# Patient Record
Sex: MALE | Race: WHITE | NOT HISPANIC OR LATINO | Employment: UNEMPLOYED | URBAN - METROPOLITAN AREA
[De-identification: names, ages, dates, MRNs, and addresses within clinical notes are randomized per-mention and may not be internally consistent; named-entity substitution may affect disease eponyms.]

---

## 2019-11-05 PROBLEM — I10 ESSENTIAL HYPERTENSION: Chronic | Status: ACTIVE | Noted: 2019-11-05

## 2020-02-13 ENCOUNTER — NURSE TRIAGE (OUTPATIENT)
Dept: ADMINISTRATIVE | Facility: CLINIC | Age: 55
End: 2020-02-13

## 2020-02-13 NOTE — TELEPHONE ENCOUNTER
Reason for Disposition   Difficult to awaken or acting confused (disoriented, slurred speech) and new onset    Additional Information   Negative: Difficult to awaken or acting confused (disoriented, slurred speech) and has diabetes    Protocols used: CONFUSION - DELIRIUM-A-OH    Cg stated pt was fine then he got dizzy and dont remember anything after that. He started to hallucinate. Double vision. Cg stated he is refusing to go to Er. He is talking but he is not himself. Confusion and slurred speech. Care advice recommend cg call  [phone]. Cg verbalized understanding

## 2020-02-17 PROBLEM — Z91.199 NON-COMPLIANT PATIENT: Chronic | Status: ACTIVE | Noted: 2020-02-17

## 2020-02-27 PROBLEM — E55.9 VITAMIN D DEFICIENCY: Chronic | Status: ACTIVE | Noted: 2020-02-27

## 2020-11-18 ENCOUNTER — HOSPITAL ENCOUNTER (OUTPATIENT)
Dept: RADIOLOGY | Facility: HOSPITAL | Age: 55
Discharge: HOME | End: 2020-11-18
Attending: INTERNAL MEDICINE
Payer: MEDICAID

## 2020-11-18 DIAGNOSIS — R09.89 BILATERAL CAROTID BRUITS: ICD-10-CM

## 2020-11-18 PROCEDURE — 93880 US CAROTID BILATERAL: ICD-10-PCS | Mod: 26,,, | Performed by: RADIOLOGY

## 2020-11-18 PROCEDURE — 93880 EXTRACRANIAL BILAT STUDY: CPT | Mod: TC

## 2020-11-18 PROCEDURE — 93880 EXTRACRANIAL BILAT STUDY: CPT | Mod: 26,,, | Performed by: RADIOLOGY

## 2020-11-24 DIAGNOSIS — G45.8 SUBCLAVIAN STEAL SYNDROME: Primary | ICD-10-CM

## 2020-11-27 ENCOUNTER — HOSPITAL ENCOUNTER (OUTPATIENT)
Dept: RADIOLOGY | Facility: HOSPITAL | Age: 55
Discharge: HOME | End: 2020-11-27
Attending: SURGERY
Payer: MEDICAID

## 2020-11-27 DIAGNOSIS — G45.8 SUBCLAVIAN STEAL SYNDROME: ICD-10-CM

## 2020-11-27 PROCEDURE — 70498 CT ANGIOGRAPHY NECK: CPT | Mod: TC

## 2020-11-27 PROCEDURE — 70498 CTA NECK: ICD-10-PCS | Mod: 26,,, | Performed by: RADIOLOGY

## 2020-11-27 PROCEDURE — 25500020 PHARM REV CODE 255: Performed by: SURGERY

## 2020-11-27 PROCEDURE — 70498 CT ANGIOGRAPHY NECK: CPT | Mod: 26,,, | Performed by: RADIOLOGY

## 2020-11-27 RX ADMIN — IOHEXOL 75 ML: 350 INJECTION, SOLUTION INTRAVENOUS at 01:11

## 2020-11-30 LAB
CREAT SERPL-MCNC: 0.9 MG/DL (ref 0.5–1.4)
SAMPLE: NORMAL

## 2020-12-02 ENCOUNTER — INITIAL CONSULT (OUTPATIENT)
Dept: VASCULAR SURGERY | Facility: CLINIC | Age: 55
End: 2020-12-02
Payer: MEDICAID

## 2020-12-02 VITALS
BODY MASS INDEX: 19.46 KG/M2 | WEIGHT: 156.5 LBS | HEIGHT: 75 IN | HEART RATE: 63 BPM | TEMPERATURE: 99 F | SYSTOLIC BLOOD PRESSURE: 162 MMHG | DIASTOLIC BLOOD PRESSURE: 69 MMHG

## 2020-12-02 DIAGNOSIS — G45.8 SUBCLAVIAN STEAL SYNDROME: ICD-10-CM

## 2020-12-02 PROCEDURE — 99204 OFFICE O/P NEW MOD 45 MIN: CPT | Mod: S$PBB,,, | Performed by: SURGERY

## 2020-12-02 PROCEDURE — 99999 PR PBB SHADOW E&M-EST. PATIENT-LVL III: CPT | Mod: PBBFAC,,, | Performed by: SURGERY

## 2020-12-02 PROCEDURE — 99204 PR OFFICE/OUTPT VISIT, NEW, LEVL IV, 45-59 MIN: ICD-10-PCS | Mod: S$PBB,,, | Performed by: SURGERY

## 2020-12-02 PROCEDURE — 99999 PR PBB SHADOW E&M-EST. PATIENT-LVL III: ICD-10-PCS | Mod: PBBFAC,,, | Performed by: SURGERY

## 2020-12-02 PROCEDURE — 99213 OFFICE O/P EST LOW 20 MIN: CPT | Mod: PBBFAC | Performed by: SURGERY

## 2020-12-02 NOTE — PROGRESS NOTES
Paresh Vance Jr.  12/02/2020    HPI:  Patient is a 55 y.o. male with a h/o CAD, active tobacco use, HTN, and HLD who is here today for evaluation of carotid stenosis possible subclavian steal symptoms.   Main c/o is diplopia and dizziness --- states double vision is constant.  Has seen ophtho x2 and was told all is fine. States dizziness is also throughout the day.    He was referred by his cardiologist after incidental imaging findings of bilateral carotid and subclavian stenosis. He states that he has noticed increasing visual problems for the past two years including blurry vision and worsening double vision. He also states that he has noticed intermittent left arm numbness and tingling which is alleviated with movement, and increasingly more sever right sided headaches over the past year.     + MI in 2015 w/ no intervention  Denies stroke  Tobacco use: Lifetime smoker. Used to smoke 2 packs per day. Now down to 1/2 pack per day.    PMD is Dr Shahid who asked me to see him    Past Medical History:   Diagnosis Date    Coronary artery disease involving native coronary artery without angina pectoris 11/12/2015    Hyperlipidemia 11/12/2015    Hypertension      Past Surgical History:   Procedure Laterality Date    FOOT SURGERY       Family History   Problem Relation Age of Onset    Heart disease Father     No Known Problems Mother     No Known Problems Sister     No Known Problems Maternal Grandmother     No Known Problems Maternal Grandfather     No Known Problems Paternal Grandmother     No Known Problems Paternal Grandfather     No Known Problems Brother     No Known Problems Maternal Aunt     No Known Problems Maternal Uncle     No Known Problems Paternal Aunt     No Known Problems Paternal Uncle     Anemia Neg Hx     Arrhythmia Neg Hx     Asthma Neg Hx     Clotting disorder Neg Hx     Fainting Neg Hx     Heart attack Neg Hx     Heart failure Neg Hx     Hyperlipidemia Neg Hx      Hypertension Neg Hx     Stroke Neg Hx     Atrial Septal Defect Neg Hx      Social History     Socioeconomic History    Marital status:      Spouse name: Not on file    Number of children: Not on file    Years of education: Not on file    Highest education level: Not on file   Occupational History    Occupation:    Social Needs    Financial resource strain: Not on file    Food insecurity     Worry: Not on file     Inability: Not on file    Transportation needs     Medical: Not on file     Non-medical: Not on file   Tobacco Use    Smoking status: Current Every Day Smoker     Packs/day: 0.50    Smokeless tobacco: Never Used   Substance and Sexual Activity    Alcohol use: Yes     Alcohol/week: 1.0 standard drinks     Types: 1 Cans of beer per week     Comment: occasionally    Drug use: Yes     Types: Marijuana    Sexual activity: Not on file   Lifestyle    Physical activity     Days per week: Not on file     Minutes per session: Not on file    Stress: Not on file   Relationships    Social connections     Talks on phone: Not on file     Gets together: Not on file     Attends [employer] service: Not on file     Active member of club or organization: Not on file     Attends meetings of clubs or organizations: Not on file     Relationship status: Not on file   Other Topics Concern    Not on file   Social History Narrative    Not on file       Current Outpatient Medications:     amLODIPine (NORVASC) 5 MG tablet, Take 1 tablet (5 mg total) by mouth once daily., Disp: 90 tablet, Rfl: 0    aspirin (ECOTRIN) 81 MG EC tablet, Take 81 mg by mouth once daily., Disp: , Rfl:     atorvastatin (LIPITOR) 20 MG tablet, Take 20 mg by mouth once daily., Disp: , Rfl:     ergocalciferol (VITAMIN D2) 50,000 unit Cap, Take 1 capsule (50,000 Units total) by mouth every 7 days., Disp: 12 capsule, Rfl: 0    ergocalciferol (VITAMIN D2) 50,000 unit Cap, Take 1 capsule (50,000 Units total) by mouth  every 7 days., Disp: 12 capsule, Rfl: 0    escitalopram oxalate (LEXAPRO) 10 MG tablet, Take 1 tablet (10 mg total) by mouth once daily., Disp: 30 tablet, Rfl: 2    REVIEW OF SYSTEMS:  General: negative; ENT: negative; Allergy and Immunology: negative; Hematological and Lymphatic: Negative; Endocrine: negative; Respiratory: no cough, shortness of breath, or wheezing; Cardiovascular: no chest pain or dyspnea on exertion; Gastrointestinal: no abdominal pain/back, change in bowel habits, or bloody stools; Genito-Urinary: no dysuria, trouble voiding, or hematuria; Musculoskeletal: negative  Neurological: no TIA or stroke symptoms; Psychiatric: no nervousness, anxiety or depression.    PHYSICAL EXAM:   Right Arm BP - Sittin/69 (20)  Left Arm BP - Sittin/71 (20)  Pulse: 63  Temp: 98.7 °F (37.1 °C)    General appearance:  Alert, well-appearing, and in no distress.  Oriented to person, place, and time   Neurological: Normal speech, no focal findings noted; CN II - XII grossly intact           Musculoskeletal: Digits/nail without cyanosis/clubbing.  Normal muscle strength/tone.                 Neck: Supple, no significant adenopathy; thyroid is not enlarged                  L carotid bruit can be auscultated                Chest:  Clear to auscultation, no wheezes, rales or rhonchi, symmetric air entry     No use of accessory muscles             Cardiac: Normal rate and regular rhythm, S1 and S2 normal; PMI non-displaced          Abdomen: Soft, nontender, nondistended, no masses or organomegaly     No rebound tenderness noted; bowel sounds normal     Pulsatile aortic mass is not palpable.     No groin adenopathy      Extremities:   2+ femoral pulses bilaterally     No ulcerations    LAB RESULTS:  Lab Results   Component Value Date    K 5.1 10/15/2020    K 4.6 2020    K 4.4 10/19/2015    CREATININE 0.93 10/15/2020    CREATININE 0.8 2020    CREATININE 0.9 10/19/2015     Lab  Results   Component Value Date    WBC 12.3 (H) 10/15/2020    WBC 9.80 02/13/2020    WBC 8.11 10/19/2015    HCT 48.5 10/15/2020    HCT 49.3 02/13/2020    HCT 43.8 10/19/2015     10/15/2020     02/13/2020     10/19/2015     Lab Results   Component Value Date    HGBA1C 5.0 02/18/2020     IMAGING:  Carotid U/S (11/18/2020; Radiology)  L  cm/s with ICA/CCA 1.5   R  cm/s  Retrograde L vert flow    CTA Neck (11/27/2020)  L subclavian stenosis 50%  The left vertebral artery is non dominant  Both vertebral arteries are patent.   Possible L ICA 50% stenosis  *there may be an origin L CCA stenosis vs artifact    IMP/PLAN:  55 y.o. male with h/o CAD, active tobacco use, HTN, and HLD with mild L subclavian stenosis 50% --- to my view, this is asymptomatic. Do not think it explains the diplopia and constant dizziness  Long d/w pt and niece     ASA, statin tx  Tobacco cessation important  F/u in 1 yr w carotid u/s and AAA u/s    Sukhjinder Jefferson MD DFSVS FACS   Vascular/Endovascular Surgery    We discussed smoking cessation for greater than 10 minutes as well as the impact that continued smoking can have on the progression of Vascular disease. This discussion included the use of medications, patches, nicotine gum, and lifestyle modifications.

## 2020-12-02 NOTE — LETTER
December 2, 2020      Gloria Shahid MD  [address]  [place] [address]           [facility] 5th Fl  [address]  [facility] [address]  Phone: [phone]  Fax: [phone]          Patient: Paresh Vance Jr.   MR Number: [id number]   YOB: 1965   Date of Visit: 12/2/2020       Dear Dr. Glroia Shahid:    Thank you for referring Paresh Vance to me for evaluation. Attached you will find relevant portions of my assessment and plan of care.    If you have questions, please do not hesitate to call me. I look forward to following Paresh Vance along with you.    Sincerely,    Sukhjinder Jefferson MD    Enclosure  CC:  No Recipients    If you would like to receive this communication electronically, please contact [email or web address] or [phone] to request more information on Reddit Link access.    For providers and/or their staff who would like to refer a patient to [facility], please contact us through our one-stop-shop provider referral line, [facility], at [phone].    If you feel you have received this communication in error or would no longer like to receive these types of communications, please e-mail [email or web address]

## 2021-04-26 ENCOUNTER — PATIENT MESSAGE (OUTPATIENT)
Dept: RESEARCH | Facility: HOSPITAL | Age: 56
End: 2021-04-26

## 2021-06-08 ENCOUNTER — OFFICE VISIT (OUTPATIENT)
Dept: OTOLARYNGOLOGY | Facility: CLINIC | Age: 56
End: 2021-06-08
Payer: MEDICAID

## 2021-06-08 VITALS
BODY MASS INDEX: 21.02 KG/M2 | SYSTOLIC BLOOD PRESSURE: 158 MMHG | DIASTOLIC BLOOD PRESSURE: 71 MMHG | HEART RATE: 66 BPM | WEIGHT: 168.19 LBS

## 2021-06-08 DIAGNOSIS — K13.0 LIP LESION: Primary | ICD-10-CM

## 2021-06-08 PROCEDURE — 99499 NO LOS: ICD-10-PCS | Mod: S$PBB,,, | Performed by: OTOLARYNGOLOGY

## 2021-06-08 PROCEDURE — 99499 UNLISTED E&M SERVICE: CPT | Mod: S$PBB,,, | Performed by: OTOLARYNGOLOGY

## 2021-06-17 ENCOUNTER — OFFICE VISIT (OUTPATIENT)
Dept: OTOLARYNGOLOGY | Facility: CLINIC | Age: 56
End: 2021-06-17
Payer: MEDICAID

## 2021-06-17 VITALS
DIASTOLIC BLOOD PRESSURE: 73 MMHG | SYSTOLIC BLOOD PRESSURE: 152 MMHG | HEART RATE: 67 BPM | WEIGHT: 167 LBS | BODY MASS INDEX: 20.87 KG/M2

## 2021-06-17 DIAGNOSIS — C44.01 BASAL CELL CARCINOMA (BCC) OF UPPER LIP: Primary | ICD-10-CM

## 2021-06-17 PROCEDURE — 99213 OFFICE O/P EST LOW 20 MIN: CPT | Mod: PBBFAC | Performed by: OTOLARYNGOLOGY

## 2021-06-17 PROCEDURE — 99999 PR PBB SHADOW E&M-EST. PATIENT-LVL III: ICD-10-PCS | Mod: PBBFAC,,, | Performed by: OTOLARYNGOLOGY

## 2021-06-17 PROCEDURE — 99204 PR OFFICE/OUTPT VISIT, NEW, LEVL IV, 45-59 MIN: ICD-10-PCS | Mod: S$PBB,,, | Performed by: OTOLARYNGOLOGY

## 2021-06-17 PROCEDURE — 99999 PR PBB SHADOW E&M-EST. PATIENT-LVL III: CPT | Mod: PBBFAC,,, | Performed by: OTOLARYNGOLOGY

## 2021-06-17 PROCEDURE — 99204 OFFICE O/P NEW MOD 45 MIN: CPT | Mod: S$PBB,,, | Performed by: OTOLARYNGOLOGY

## 2021-06-20 PROBLEM — C44.01 BASAL CELL CARCINOMA (BCC) OF UPPER LIP: Status: ACTIVE | Noted: 2021-06-20

## 2021-06-20 RX ORDER — SODIUM CHLORIDE 0.9 % (FLUSH) 0.9 %
10 SYRINGE (ML) INJECTION
Status: CANCELLED | OUTPATIENT
Start: 2021-06-20

## 2021-06-20 RX ORDER — LIDOCAINE HYDROCHLORIDE 10 MG/ML
1 INJECTION, SOLUTION EPIDURAL; INFILTRATION; INTRACAUDAL; PERINEURAL ONCE
Status: CANCELLED | OUTPATIENT
Start: 2021-06-20 | End: 2021-06-20

## 2021-06-21 DIAGNOSIS — Z01.818 PRE-OP TESTING: ICD-10-CM

## 2021-06-30 ENCOUNTER — TELEPHONE (OUTPATIENT)
Dept: PREADMISSION TESTING | Facility: HOSPITAL | Age: 56
End: 2021-06-30

## 2021-06-30 ENCOUNTER — ANESTHESIA EVENT (OUTPATIENT)
Dept: SURGERY | Facility: HOSPITAL | Age: 56
DRG: 581 | End: 2021-06-30
Payer: MEDICAID

## 2021-07-01 ENCOUNTER — TELEPHONE (OUTPATIENT)
Dept: PREADMISSION TESTING | Facility: HOSPITAL | Age: 56
End: 2021-07-01

## 2021-07-01 ENCOUNTER — HOSPITAL ENCOUNTER (OUTPATIENT)
Dept: PREADMISSION TESTING | Facility: HOSPITAL | Age: 56
Discharge: HOME | End: 2021-07-01
Attending: OTOLARYNGOLOGY
Payer: MEDICAID

## 2021-07-21 ENCOUNTER — OFFICE VISIT (OUTPATIENT)
Dept: CARDIOLOGY | Facility: CLINIC | Age: 56
End: 2021-07-21
Payer: MEDICAID

## 2021-07-21 VITALS
BODY MASS INDEX: 20.66 KG/M2 | SYSTOLIC BLOOD PRESSURE: 122 MMHG | DIASTOLIC BLOOD PRESSURE: 74 MMHG | HEIGHT: 75 IN | HEART RATE: 77 BPM | WEIGHT: 166.13 LBS

## 2021-07-21 DIAGNOSIS — I77.1 SUBCLAVIAN ARTERY STENOSIS, LEFT: ICD-10-CM

## 2021-07-21 DIAGNOSIS — Z01.818 PREOPERATIVE CLEARANCE: ICD-10-CM

## 2021-07-21 DIAGNOSIS — I25.10 CORONARY ARTERY DISEASE, ANGINA PRESENCE UNSPECIFIED, UNSPECIFIED VESSEL OR LESION TYPE, UNSPECIFIED WHETHER NATIVE OR TRANSPLANTED HEART: Primary | ICD-10-CM

## 2021-07-21 DIAGNOSIS — I73.9 PERIPHERAL VASCULAR DISEASE: ICD-10-CM

## 2021-07-21 PROCEDURE — 99999 PR PBB SHADOW E&M-EST. PATIENT-LVL III: CPT | Mod: PBBFAC,,, | Performed by: INTERNAL MEDICINE

## 2021-07-21 PROCEDURE — 99999 PR PBB SHADOW E&M-EST. PATIENT-LVL III: ICD-10-PCS | Mod: PBBFAC,,, | Performed by: INTERNAL MEDICINE

## 2021-07-21 PROCEDURE — 99213 OFFICE O/P EST LOW 20 MIN: CPT | Mod: PBBFAC,PN | Performed by: INTERNAL MEDICINE

## 2021-07-21 PROCEDURE — 99204 PR OFFICE/OUTPT VISIT, NEW, LEVL IV, 45-59 MIN: ICD-10-PCS | Mod: S$PBB,,, | Performed by: INTERNAL MEDICINE

## 2021-07-21 PROCEDURE — 99204 OFFICE O/P NEW MOD 45 MIN: CPT | Mod: S$PBB,,, | Performed by: INTERNAL MEDICINE

## 2021-07-21 RX ORDER — HYDROCODONE BITARTRATE AND ACETAMINOPHEN 5; 325 MG/1; MG/1
TABLET ORAL
Status: ON HOLD | COMMUNITY
Start: 2021-07-05 | End: 2021-07-23 | Stop reason: HOSPADM

## 2021-07-21 RX ORDER — IBUPROFEN 800 MG/1
TABLET ORAL
Status: ON HOLD | COMMUNITY
Start: 2021-07-05 | End: 2023-05-13 | Stop reason: HOSPADM

## 2021-07-22 ENCOUNTER — TELEPHONE (OUTPATIENT)
Dept: OTOLARYNGOLOGY | Facility: CLINIC | Age: 56
End: 2021-07-22

## 2021-07-22 ENCOUNTER — HOSPITAL ENCOUNTER (OUTPATIENT)
Dept: PREADMISSION TESTING | Facility: HOSPITAL | Age: 56
Discharge: HOME | DRG: 581 | End: 2021-07-22
Attending: OTOLARYNGOLOGY
Payer: MEDICAID

## 2021-07-22 VITALS
OXYGEN SATURATION: 97 % | SYSTOLIC BLOOD PRESSURE: 166 MMHG | TEMPERATURE: 99 F | HEART RATE: 69 BPM | DIASTOLIC BLOOD PRESSURE: 76 MMHG | BODY MASS INDEX: 20.64 KG/M2 | HEIGHT: 75 IN | WEIGHT: 166 LBS

## 2021-07-23 ENCOUNTER — HOSPITAL ENCOUNTER (INPATIENT)
Facility: HOSPITAL | Age: 56
LOS: 1 days | Discharge: HOME | DRG: 581 | End: 2021-07-23
Attending: OTOLARYNGOLOGY | Admitting: OTOLARYNGOLOGY
Payer: MEDICAID

## 2021-07-23 ENCOUNTER — ANESTHESIA (OUTPATIENT)
Dept: SURGERY | Facility: HOSPITAL | Age: 56
DRG: 581 | End: 2021-07-23
Payer: MEDICAID

## 2021-07-23 VITALS
SYSTOLIC BLOOD PRESSURE: 118 MMHG | RESPIRATION RATE: 18 BRPM | HEART RATE: 62 BPM | BODY MASS INDEX: 20.76 KG/M2 | TEMPERATURE: 99 F | DIASTOLIC BLOOD PRESSURE: 57 MMHG | WEIGHT: 167 LBS | OXYGEN SATURATION: 96 % | HEIGHT: 75 IN

## 2021-07-23 DIAGNOSIS — E03.9 ACQUIRED HYPOTHYROIDISM: ICD-10-CM

## 2021-07-23 DIAGNOSIS — C44.01 BASAL CELL CARCINOMA (BCC) OF UPPER LIP: Primary | ICD-10-CM

## 2021-07-23 DIAGNOSIS — Z01.818 PREOPERATIVE TESTING: ICD-10-CM

## 2021-07-23 LAB — SARS-COV-2 RDRP RESP QL NAA+PROBE: NEGATIVE

## 2021-07-23 PROCEDURE — 88331 PATH CONSLTJ SURG 1 BLK 1SPC: CPT | Mod: 26,,, | Performed by: PATHOLOGY

## 2021-07-23 PROCEDURE — 36000707: Performed by: OTOLARYNGOLOGY

## 2021-07-23 PROCEDURE — 40525 RECONSTRUCT LIP WITH FLAP: CPT | Mod: ,,, | Performed by: OTOLARYNGOLOGY

## 2021-07-23 PROCEDURE — 25000003 PHARM REV CODE 250: Performed by: STUDENT IN AN ORGANIZED HEALTH CARE EDUCATION/TRAINING PROGRAM

## 2021-07-23 PROCEDURE — 63600175 PHARM REV CODE 636 W HCPCS: Performed by: STUDENT IN AN ORGANIZED HEALTH CARE EDUCATION/TRAINING PROGRAM

## 2021-07-23 PROCEDURE — D9220A PRA ANESTHESIA: ICD-10-PCS | Mod: ,,, | Performed by: ANESTHESIOLOGY

## 2021-07-23 PROCEDURE — 36000706: Performed by: OTOLARYNGOLOGY

## 2021-07-23 PROCEDURE — 71000015 HC POSTOP RECOV 1ST HR: Performed by: OTOLARYNGOLOGY

## 2021-07-23 PROCEDURE — 88331 PATH CONSLTJ SURG 1 BLK 1SPC: CPT | Performed by: PATHOLOGY

## 2021-07-23 PROCEDURE — 40525 PR FULL THICK EXCIS LIP,RECON W FLAP: ICD-10-PCS | Mod: ,,, | Performed by: OTOLARYNGOLOGY

## 2021-07-23 PROCEDURE — 88332 PATH CONSLTJ SURG EA ADD BLK: CPT | Mod: 26,,, | Performed by: PATHOLOGY

## 2021-07-23 PROCEDURE — 63600175 PHARM REV CODE 636 W HCPCS: Performed by: OTOLARYNGOLOGY

## 2021-07-23 PROCEDURE — 88332 PATH CONSLTJ SURG EA ADD BLK: CPT | Performed by: PATHOLOGY

## 2021-07-23 PROCEDURE — 88305 TISSUE EXAM BY PATHOLOGIST: CPT | Mod: 26,,, | Performed by: PATHOLOGY

## 2021-07-23 PROCEDURE — C1769 GUIDE WIRE: HCPCS | Performed by: OTOLARYNGOLOGY

## 2021-07-23 PROCEDURE — U0002 COVID-19 LAB TEST NON-CDC: HCPCS | Performed by: OTOLARYNGOLOGY

## 2021-07-23 PROCEDURE — 88305 TISSUE EXAM BY PATHOLOGIST: CPT | Performed by: PATHOLOGY

## 2021-07-23 PROCEDURE — 37000008 HC ANESTHESIA 1ST 15 MINUTES: Performed by: OTOLARYNGOLOGY

## 2021-07-23 PROCEDURE — 12000002 HC ACUTE/MED SURGE SEMI-PRIVATE ROOM

## 2021-07-23 PROCEDURE — D9220A PRA ANESTHESIA: Mod: ,,, | Performed by: ANESTHESIOLOGY

## 2021-07-23 PROCEDURE — 88305 TISSUE EXAM BY PATHOLOGIST: ICD-10-PCS | Mod: 26,,, | Performed by: PATHOLOGY

## 2021-07-23 PROCEDURE — 27201037 HC PRESSURE MONITORING SET UP

## 2021-07-23 PROCEDURE — 88332 PR  PATH CONSULT IN SURG,W ADDN FRZ SEC: ICD-10-PCS | Mod: 26,,, | Performed by: PATHOLOGY

## 2021-07-23 PROCEDURE — 27201423 OPTIME MED/SURG SUP & DEVICES STERILE SUPPLY: Performed by: OTOLARYNGOLOGY

## 2021-07-23 PROCEDURE — 63600175 PHARM REV CODE 636 W HCPCS: Performed by: ANESTHESIOLOGY

## 2021-07-23 PROCEDURE — 25000003 PHARM REV CODE 250: Performed by: OTOLARYNGOLOGY

## 2021-07-23 PROCEDURE — 71000016 HC POSTOP RECOV ADDL HR: Performed by: OTOLARYNGOLOGY

## 2021-07-23 PROCEDURE — 71000033 HC RECOVERY, INTIAL HOUR: Performed by: OTOLARYNGOLOGY

## 2021-07-23 PROCEDURE — 88331 PR  PATH CONSULT IN SURG,W FRZ SEC: ICD-10-PCS | Mod: 26,,, | Performed by: PATHOLOGY

## 2021-07-23 PROCEDURE — 37000009 HC ANESTHESIA EA ADD 15 MINS: Performed by: OTOLARYNGOLOGY

## 2021-07-23 PROCEDURE — 94761 N-INVAS EAR/PLS OXIMETRY MLT: CPT

## 2021-07-23 RX ORDER — BACITRACIN 500 [USP'U]/G
OINTMENT TOPICAL 2 TIMES DAILY
Qty: 28 G | Refills: 1 | Status: SHIPPED | OUTPATIENT
Start: 2021-07-23 | End: 2021-08-07

## 2021-07-23 RX ORDER — IBUPROFEN 600 MG/1
600 TABLET ORAL EVERY 6 HOURS PRN
Status: CANCELLED | COMMUNITY
Start: 2021-07-23

## 2021-07-23 RX ORDER — ACETAMINOPHEN 10 MG/ML
INJECTION, SOLUTION INTRAVENOUS
Status: DISCONTINUED | OUTPATIENT
Start: 2021-07-23 | End: 2021-07-23

## 2021-07-23 RX ORDER — MIDAZOLAM HYDROCHLORIDE 1 MG/ML
INJECTION, SOLUTION INTRAMUSCULAR; INTRAVENOUS
Status: DISCONTINUED | OUTPATIENT
Start: 2021-07-23 | End: 2021-07-23

## 2021-07-23 RX ORDER — HYDROCODONE BITARTRATE AND ACETAMINOPHEN 5; 325 MG/1; MG/1
1 TABLET ORAL EVERY 6 HOURS PRN
Qty: 15 TABLET | Refills: 0 | OUTPATIENT
Start: 2021-07-23 | End: 2021-08-01

## 2021-07-23 RX ORDER — ONDANSETRON 2 MG/ML
INJECTION INTRAMUSCULAR; INTRAVENOUS
Status: DISCONTINUED | OUTPATIENT
Start: 2021-07-23 | End: 2021-07-23

## 2021-07-23 RX ORDER — ACETAMINOPHEN 325 MG/1
650 TABLET ORAL
Status: ON HOLD | COMMUNITY
Start: 2021-07-23 | End: 2021-08-25 | Stop reason: HOSPADM

## 2021-07-23 RX ORDER — PHENYLEPHRINE HCL IN 0.9% NACL 1 MG/10 ML
SYRINGE (ML) INTRAVENOUS
Status: DISCONTINUED | OUTPATIENT
Start: 2021-07-23 | End: 2021-07-23

## 2021-07-23 RX ORDER — SODIUM CHLORIDE 0.9 % (FLUSH) 0.9 %
10 SYRINGE (ML) INJECTION
Status: DISCONTINUED | OUTPATIENT
Start: 2021-07-23 | End: 2021-07-23 | Stop reason: HOSPADM

## 2021-07-23 RX ORDER — AMOXICILLIN 500 MG/1
500 TABLET, FILM COATED ORAL EVERY 12 HOURS
Qty: 20 TABLET | Refills: 0 | Status: SHIPPED | OUTPATIENT
Start: 2021-07-23 | End: 2021-08-01 | Stop reason: ALTCHOICE

## 2021-07-23 RX ORDER — KETAMINE HCL IN 0.9 % NACL 50 MG/5 ML
SYRINGE (ML) INTRAVENOUS
Status: DISCONTINUED | OUTPATIENT
Start: 2021-07-23 | End: 2021-07-23

## 2021-07-23 RX ORDER — ROCURONIUM BROMIDE 10 MG/ML
INJECTION, SOLUTION INTRAVENOUS
Status: DISCONTINUED | OUTPATIENT
Start: 2021-07-23 | End: 2021-07-23

## 2021-07-23 RX ORDER — HYDROCODONE BITARTRATE AND ACETAMINOPHEN 5; 325 MG/1; MG/1
1 TABLET ORAL EVERY 6 HOURS PRN
Qty: 30 TABLET | Refills: 0 | OUTPATIENT
Start: 2021-07-23 | End: 2021-08-01

## 2021-07-23 RX ORDER — FENTANYL CITRATE 50 UG/ML
25 INJECTION, SOLUTION INTRAMUSCULAR; INTRAVENOUS EVERY 5 MIN PRN
Status: DISCONTINUED | OUTPATIENT
Start: 2021-07-23 | End: 2021-07-23 | Stop reason: HOSPADM

## 2021-07-23 RX ORDER — LIDOCAINE HYDROCHLORIDE AND EPINEPHRINE 10; 10 MG/ML; UG/ML
INJECTION, SOLUTION INFILTRATION; PERINEURAL
Status: DISCONTINUED | OUTPATIENT
Start: 2021-07-23 | End: 2021-07-23 | Stop reason: HOSPADM

## 2021-07-23 RX ORDER — NEOSTIGMINE METHYLSULFATE 0.5 MG/ML
INJECTION, SOLUTION INTRAVENOUS
Status: DISCONTINUED | OUTPATIENT
Start: 2021-07-23 | End: 2021-07-23

## 2021-07-23 RX ORDER — HYDROCODONE BITARTRATE AND ACETAMINOPHEN 5; 325 MG/1; MG/1
1 TABLET ORAL EVERY 4 HOURS PRN
Status: DISCONTINUED | OUTPATIENT
Start: 2021-07-23 | End: 2021-07-23 | Stop reason: HOSPADM

## 2021-07-23 RX ORDER — LIDOCAINE HYDROCHLORIDE 10 MG/ML
1 INJECTION, SOLUTION EPIDURAL; INFILTRATION; INTRACAUDAL; PERINEURAL ONCE
Status: DISCONTINUED | OUTPATIENT
Start: 2021-07-23 | End: 2021-07-23 | Stop reason: HOSPADM

## 2021-07-23 RX ORDER — PROPOFOL 10 MG/ML
VIAL (ML) INTRAVENOUS
Status: DISCONTINUED | OUTPATIENT
Start: 2021-07-23 | End: 2021-07-23

## 2021-07-23 RX ORDER — ACETAMINOPHEN 325 MG/1
650 TABLET ORAL EVERY 4 HOURS PRN
Status: DISCONTINUED | OUTPATIENT
Start: 2021-07-23 | End: 2021-07-23 | Stop reason: HOSPADM

## 2021-07-23 RX ORDER — OXYCODONE HYDROCHLORIDE 5 MG/1
10 TABLET ORAL EVERY 4 HOURS PRN
Status: DISCONTINUED | OUTPATIENT
Start: 2021-07-23 | End: 2021-07-23 | Stop reason: HOSPADM

## 2021-07-23 RX ORDER — FENTANYL CITRATE 50 UG/ML
INJECTION, SOLUTION INTRAMUSCULAR; INTRAVENOUS
Status: DISCONTINUED | OUTPATIENT
Start: 2021-07-23 | End: 2021-07-23

## 2021-07-23 RX ORDER — DEXMEDETOMIDINE HYDROCHLORIDE 100 UG/ML
INJECTION, SOLUTION INTRAVENOUS
Status: DISCONTINUED | OUTPATIENT
Start: 2021-07-23 | End: 2021-07-23

## 2021-07-23 RX ORDER — ONDANSETRON 2 MG/ML
4 INJECTION INTRAMUSCULAR; INTRAVENOUS DAILY PRN
Status: DISCONTINUED | OUTPATIENT
Start: 2021-07-23 | End: 2021-07-23 | Stop reason: HOSPADM

## 2021-07-23 RX ORDER — LIDOCAINE HYDROCHLORIDE 20 MG/ML
INJECTION, SOLUTION EPIDURAL; INFILTRATION; INTRACAUDAL; PERINEURAL
Status: DISCONTINUED | OUTPATIENT
Start: 2021-07-23 | End: 2021-07-23

## 2021-07-23 RX ORDER — BACITRACIN ZINC 500 UNIT/G
OINTMENT (GRAM) TOPICAL
Status: DISCONTINUED | OUTPATIENT
Start: 2021-07-23 | End: 2021-07-23 | Stop reason: HOSPADM

## 2021-07-23 RX ORDER — ONDANSETRON 8 MG/1
8 TABLET, ORALLY DISINTEGRATING ORAL EVERY 6 HOURS PRN
Status: DISCONTINUED | OUTPATIENT
Start: 2021-07-23 | End: 2021-07-23 | Stop reason: HOSPADM

## 2021-07-23 RX ORDER — METOCLOPRAMIDE HYDROCHLORIDE 5 MG/ML
5 INJECTION INTRAMUSCULAR; INTRAVENOUS EVERY 6 HOURS PRN
Status: DISCONTINUED | OUTPATIENT
Start: 2021-07-23 | End: 2021-07-23 | Stop reason: HOSPADM

## 2021-07-23 RX ORDER — DEXAMETHASONE SODIUM PHOSPHATE 4 MG/ML
INJECTION, SOLUTION INTRA-ARTICULAR; INTRALESIONAL; INTRAMUSCULAR; INTRAVENOUS; SOFT TISSUE
Status: DISCONTINUED | OUTPATIENT
Start: 2021-07-23 | End: 2021-07-23

## 2021-07-23 RX ADMIN — Medication 200 MCG: at 09:07

## 2021-07-23 RX ADMIN — DEXMEDETOMIDINE HYDROCHLORIDE 8 MCG: 100 INJECTION, SOLUTION INTRAVENOUS at 09:07

## 2021-07-23 RX ADMIN — AMPICILLIN SODIUM AND SULBACTAM SODIUM 3 G: 2; 1 INJECTION, POWDER, FOR SOLUTION INTRAMUSCULAR; INTRAVENOUS at 07:07

## 2021-07-23 RX ADMIN — Medication 200 MCG: at 07:07

## 2021-07-23 RX ADMIN — Medication 20 MG: at 08:07

## 2021-07-23 RX ADMIN — ONDANSETRON 4 MG: 2 INJECTION INTRAMUSCULAR; INTRAVENOUS at 09:07

## 2021-07-23 RX ADMIN — FENTANYL CITRATE 25 MCG: 50 INJECTION INTRAMUSCULAR; INTRAVENOUS at 11:07

## 2021-07-23 RX ADMIN — SODIUM CHLORIDE: 0.9 INJECTION, SOLUTION INTRAVENOUS at 06:07

## 2021-07-23 RX ADMIN — ROCURONIUM BROMIDE 50 MG: 10 INJECTION INTRAVENOUS at 07:07

## 2021-07-23 RX ADMIN — NEOSTIGMINE METHYLSULFATE 4 MG: 0.5 INJECTION INTRAVENOUS at 09:07

## 2021-07-23 RX ADMIN — MIDAZOLAM 2 MG: 1 INJECTION INTRAMUSCULAR; INTRAVENOUS at 06:07

## 2021-07-23 RX ADMIN — GLYCOPYRROLATE 0.8 MG: 0.2 INJECTION, SOLUTION INTRAMUSCULAR; INTRAVITREAL at 09:07

## 2021-07-23 RX ADMIN — Medication 200 MCG: at 08:07

## 2021-07-23 RX ADMIN — DEXMEDETOMIDINE HYDROCHLORIDE 8 MCG: 100 INJECTION, SOLUTION INTRAVENOUS at 08:07

## 2021-07-23 RX ADMIN — ROCURONIUM BROMIDE 30 MG: 10 INJECTION INTRAVENOUS at 08:07

## 2021-07-23 RX ADMIN — OXYCODONE 10 MG: 5 TABLET ORAL at 11:07

## 2021-07-23 RX ADMIN — PROPOFOL 120 MG: 10 INJECTION, EMULSION INTRAVENOUS at 07:07

## 2021-07-23 RX ADMIN — LIDOCAINE HYDROCHLORIDE 100 MG: 20 INJECTION, SOLUTION EPIDURAL; INFILTRATION; INTRACAUDAL at 07:07

## 2021-07-23 RX ADMIN — FENTANYL CITRATE 25 MCG: 50 INJECTION INTRAMUSCULAR; INTRAVENOUS at 10:07

## 2021-07-23 RX ADMIN — ACETAMINOPHEN 1000 MG: 10 INJECTION INTRAVENOUS at 08:07

## 2021-07-23 RX ADMIN — FENTANYL CITRATE 100 MCG: 50 INJECTION INTRAMUSCULAR; INTRAVENOUS at 07:07

## 2021-07-23 RX ADMIN — DEXAMETHASONE SODIUM PHOSPHATE 12 MG: 4 INJECTION INTRA-ARTICULAR; INTRALESIONAL; INTRAMUSCULAR; INTRAVENOUS; SOFT TISSUE at 08:07

## 2021-07-28 LAB
FINAL PATHOLOGIC DIAGNOSIS: NORMAL
FROZEN SECTION DIAGNOSIS: NORMAL
FROZEN SECTION FOOTNOTE: NORMAL
GROSS: NORMAL
Lab: NORMAL
MICROSCOPIC EXAM: NORMAL

## 2021-08-01 ENCOUNTER — HOSPITAL ENCOUNTER (EMERGENCY)
Facility: HOSPITAL | Age: 56
Discharge: HOME | End: 2021-08-01
Attending: EMERGENCY MEDICINE
Payer: MEDICAID

## 2021-08-01 VITALS
OXYGEN SATURATION: 98 % | HEIGHT: 75 IN | BODY MASS INDEX: 19.89 KG/M2 | SYSTOLIC BLOOD PRESSURE: 180 MMHG | RESPIRATION RATE: 18 BRPM | HEART RATE: 74 BPM | WEIGHT: 160 LBS | TEMPERATURE: 96 F | DIASTOLIC BLOOD PRESSURE: 83 MMHG

## 2021-08-01 DIAGNOSIS — T81.31XA WOUND DEHISCENCE, SURGICAL, INITIAL ENCOUNTER: Primary | ICD-10-CM

## 2021-08-01 PROCEDURE — 99284 EMERGENCY DEPT VISIT MOD MDM: CPT | Mod: ,,, | Performed by: EMERGENCY MEDICINE

## 2021-08-01 PROCEDURE — 99284 EMERGENCY DEPT VISIT MOD MDM: CPT

## 2021-08-01 PROCEDURE — 99284 PR EMERGENCY DEPT VISIT,LEVEL IV: ICD-10-PCS | Mod: ,,, | Performed by: EMERGENCY MEDICINE

## 2021-08-01 RX ORDER — AMOXICILLIN AND CLAVULANATE POTASSIUM 875; 125 MG/1; MG/1
1 TABLET, FILM COATED ORAL 2 TIMES DAILY
Qty: 20 TABLET | Refills: 0 | Status: SHIPPED | OUTPATIENT
Start: 2021-08-01 | End: 2021-08-11 | Stop reason: ALTCHOICE

## 2021-08-01 RX ORDER — HYDROCODONE BITARTRATE AND ACETAMINOPHEN 5; 325 MG/1; MG/1
1 TABLET ORAL EVERY 4 HOURS PRN
Qty: 4 TABLET | Refills: 0 | Status: SHIPPED | OUTPATIENT
Start: 2021-08-01 | End: 2021-08-05 | Stop reason: SDUPTHER

## 2021-08-05 ENCOUNTER — OFFICE VISIT (OUTPATIENT)
Dept: OTOLARYNGOLOGY | Facility: CLINIC | Age: 56
End: 2021-08-05
Payer: MEDICAID

## 2021-08-05 VITALS
DIASTOLIC BLOOD PRESSURE: 88 MMHG | HEART RATE: 94 BPM | BODY MASS INDEX: 19.32 KG/M2 | WEIGHT: 154.56 LBS | SYSTOLIC BLOOD PRESSURE: 137 MMHG

## 2021-08-05 DIAGNOSIS — Z01.818 PRE-OP TESTING: ICD-10-CM

## 2021-08-05 DIAGNOSIS — C44.01 BASAL CELL CARCINOMA (BCC) OF UPPER LIP: Primary | ICD-10-CM

## 2021-08-05 PROCEDURE — 99999 PR PBB SHADOW E&M-EST. PATIENT-LVL V: ICD-10-PCS | Mod: PBBFAC,,, | Performed by: NURSE PRACTITIONER

## 2021-08-05 PROCEDURE — 99215 OFFICE O/P EST HI 40 MIN: CPT | Mod: PBBFAC | Performed by: NURSE PRACTITIONER

## 2021-08-05 PROCEDURE — 99999 PR PBB SHADOW E&M-EST. PATIENT-LVL V: CPT | Mod: PBBFAC,,, | Performed by: NURSE PRACTITIONER

## 2021-08-05 PROCEDURE — 99024 POSTOP FOLLOW-UP VISIT: CPT | Mod: ,,, | Performed by: NURSE PRACTITIONER

## 2021-08-05 PROCEDURE — 99024 PR POST-OP FOLLOW-UP VISIT: ICD-10-PCS | Mod: ,,, | Performed by: NURSE PRACTITIONER

## 2021-08-05 RX ORDER — LIDOCAINE HYDROCHLORIDE 10 MG/ML
1 INJECTION, SOLUTION EPIDURAL; INFILTRATION; INTRACAUDAL; PERINEURAL ONCE
Status: CANCELLED | OUTPATIENT
Start: 2021-08-05 | End: 2021-08-05

## 2021-08-05 RX ORDER — SODIUM CHLORIDE 0.9 % (FLUSH) 0.9 %
10 SYRINGE (ML) INJECTION
Status: CANCELLED | OUTPATIENT
Start: 2021-08-05

## 2021-08-05 RX ORDER — HYDROCODONE BITARTRATE AND ACETAMINOPHEN 5; 325 MG/1; MG/1
1 TABLET ORAL EVERY 4 HOURS PRN
Qty: 30 TABLET | Refills: 0 | Status: ON HOLD | OUTPATIENT
Start: 2021-08-05 | End: 2021-08-25 | Stop reason: HOSPADM

## 2021-08-09 ENCOUNTER — TELEPHONE (OUTPATIENT)
Dept: PREADMISSION TESTING | Facility: HOSPITAL | Age: 56
End: 2021-08-09

## 2021-08-10 ENCOUNTER — TELEPHONE (OUTPATIENT)
Dept: CARDIOLOGY | Facility: CLINIC | Age: 56
End: 2021-08-10

## 2021-08-11 ENCOUNTER — TELEPHONE (OUTPATIENT)
Dept: CARDIOLOGY | Facility: CLINIC | Age: 56
End: 2021-08-11

## 2021-08-11 ENCOUNTER — TELEPHONE (OUTPATIENT)
Dept: OTOLARYNGOLOGY | Facility: CLINIC | Age: 56
End: 2021-08-11

## 2021-08-12 ENCOUNTER — TELEPHONE (OUTPATIENT)
Dept: OTOLARYNGOLOGY | Facility: CLINIC | Age: 56
End: 2021-08-12

## 2021-08-12 ENCOUNTER — HOSPITAL ENCOUNTER (OUTPATIENT)
Facility: HOSPITAL | Age: 56
Discharge: HOME | DRG: 606 | End: 2021-08-12
Attending: OTOLARYNGOLOGY | Admitting: OTOLARYNGOLOGY
Payer: MEDICAID

## 2021-08-12 DIAGNOSIS — Z01.818 PREOPERATIVE TESTING: Primary | ICD-10-CM

## 2021-08-12 DIAGNOSIS — C44.01 BASAL CELL CARCINOMA (BCC) OF UPPER LIP: ICD-10-CM

## 2021-08-12 DIAGNOSIS — U07.1 COVID-19 VIRUS DETECTED: ICD-10-CM

## 2021-08-12 LAB — SARS-COV-2 RDRP RESP QL NAA+PROBE: POSITIVE

## 2021-08-12 PROCEDURE — 99499 UNLISTED E&M SERVICE: CPT | Mod: ,,, | Performed by: OTOLARYNGOLOGY

## 2021-08-12 PROCEDURE — G0378 HOSPITAL OBSERVATION PER HR: HCPCS

## 2021-08-12 PROCEDURE — G0379 DIRECT REFER HOSPITAL OBSERV: HCPCS

## 2021-08-12 PROCEDURE — U0002 COVID-19 LAB TEST NON-CDC: HCPCS | Performed by: OTOLARYNGOLOGY

## 2021-08-12 PROCEDURE — 99499 NO LOS: ICD-10-PCS | Mod: ,,, | Performed by: OTOLARYNGOLOGY

## 2021-08-12 PROCEDURE — 12000002 HC ACUTE/MED SURGE SEMI-PRIVATE ROOM

## 2021-08-12 RX ORDER — LIDOCAINE HYDROCHLORIDE 10 MG/ML
1 INJECTION, SOLUTION EPIDURAL; INFILTRATION; INTRACAUDAL; PERINEURAL ONCE
Status: DISCONTINUED | OUTPATIENT
Start: 2021-08-12 | End: 2021-08-12 | Stop reason: HOSPADM

## 2021-08-12 RX ORDER — SODIUM CHLORIDE 0.9 % (FLUSH) 0.9 %
10 SYRINGE (ML) INJECTION
Status: DISCONTINUED | OUTPATIENT
Start: 2021-08-12 | End: 2021-08-12 | Stop reason: HOSPADM

## 2021-08-16 DIAGNOSIS — C44.01 BASAL CELL CARCINOMA (BCC) OF UPPER LIP: Primary | ICD-10-CM

## 2021-08-16 RX ORDER — SODIUM CHLORIDE 0.9 % (FLUSH) 0.9 %
10 SYRINGE (ML) INJECTION
Status: CANCELLED | OUTPATIENT
Start: 2021-08-16

## 2021-08-16 RX ORDER — LIDOCAINE HYDROCHLORIDE 10 MG/ML
1 INJECTION, SOLUTION EPIDURAL; INFILTRATION; INTRACAUDAL; PERINEURAL ONCE
Status: CANCELLED | OUTPATIENT
Start: 2021-08-16 | End: 2021-08-16

## 2021-08-17 ENCOUNTER — TELEPHONE (OUTPATIENT)
Dept: CARDIOLOGY | Facility: CLINIC | Age: 56
End: 2021-08-17

## 2021-08-22 ENCOUNTER — ANESTHESIA EVENT (OUTPATIENT)
Dept: SURGERY | Facility: HOSPITAL | Age: 56
End: 2021-08-22
Payer: MEDICAID

## 2021-08-23 ENCOUNTER — TELEPHONE (OUTPATIENT)
Dept: OTOLARYNGOLOGY | Facility: CLINIC | Age: 56
End: 2021-08-23

## 2021-08-24 ENCOUNTER — HOSPITAL ENCOUNTER (OUTPATIENT)
Facility: HOSPITAL | Age: 56
LOS: 1 days | Discharge: HOME | End: 2021-08-25
Attending: OTOLARYNGOLOGY | Admitting: OTOLARYNGOLOGY
Payer: MEDICAID

## 2021-08-24 ENCOUNTER — ANESTHESIA (OUTPATIENT)
Dept: SURGERY | Facility: HOSPITAL | Age: 56
End: 2021-08-24
Payer: MEDICAID

## 2021-08-24 DIAGNOSIS — C44.01 BASAL CELL CARCINOMA (BCC) OF UPPER LIP: ICD-10-CM

## 2021-08-24 LAB
ABO + RH BLD: NORMAL
BLD GP AB SCN CELLS X3 SERPL QL: NORMAL

## 2021-08-24 PROCEDURE — 63600175 PHARM REV CODE 636 W HCPCS: Performed by: STUDENT IN AN ORGANIZED HEALTH CARE EDUCATION/TRAINING PROGRAM

## 2021-08-24 PROCEDURE — 71000016 HC POSTOP RECOV ADDL HR: Performed by: OTOLARYNGOLOGY

## 2021-08-24 PROCEDURE — 63600175 PHARM REV CODE 636 W HCPCS: Performed by: ANESTHESIOLOGY

## 2021-08-24 PROCEDURE — D9220A PRA ANESTHESIA: Mod: ,,, | Performed by: ANESTHESIOLOGY

## 2021-08-24 PROCEDURE — 25000003 PHARM REV CODE 250: Performed by: STUDENT IN AN ORGANIZED HEALTH CARE EDUCATION/TRAINING PROGRAM

## 2021-08-24 PROCEDURE — 40527: ICD-10-PCS | Mod: 58,,, | Performed by: OTOLARYNGOLOGY

## 2021-08-24 PROCEDURE — 00300 ANES ALL PX INTEG H/N/PTRUNK: CPT | Performed by: OTOLARYNGOLOGY

## 2021-08-24 PROCEDURE — 15004 PR WND PREP PED, FACE/NCK/HND/FT/GEN 1ST 100 CM: ICD-10-PCS | Mod: 58,,, | Performed by: OTOLARYNGOLOGY

## 2021-08-24 PROCEDURE — 37000008 HC ANESTHESIA 1ST 15 MINUTES: Performed by: OTOLARYNGOLOGY

## 2021-08-24 PROCEDURE — 37000009 HC ANESTHESIA EA ADD 15 MINS: Performed by: OTOLARYNGOLOGY

## 2021-08-24 PROCEDURE — D9220A PRA ANESTHESIA: ICD-10-PCS | Mod: ,,, | Performed by: ANESTHESIOLOGY

## 2021-08-24 PROCEDURE — C1769 GUIDE WIRE: HCPCS | Performed by: OTOLARYNGOLOGY

## 2021-08-24 PROCEDURE — 40527 RECONSTRUCT LIP WITH FLAP: CPT | Mod: 58,,, | Performed by: OTOLARYNGOLOGY

## 2021-08-24 PROCEDURE — 94761 N-INVAS EAR/PLS OXIMETRY MLT: CPT

## 2021-08-24 PROCEDURE — 25000003 PHARM REV CODE 250: Performed by: OTOLARYNGOLOGY

## 2021-08-24 PROCEDURE — 25000003 PHARM REV CODE 250: Performed by: ANESTHESIOLOGY

## 2021-08-24 PROCEDURE — 86900 BLOOD TYPING SEROLOGIC ABO: CPT | Performed by: STUDENT IN AN ORGANIZED HEALTH CARE EDUCATION/TRAINING PROGRAM

## 2021-08-24 PROCEDURE — 36000707: Performed by: OTOLARYNGOLOGY

## 2021-08-24 PROCEDURE — 15004 WOUND PREP F/N/HF/G: CPT | Mod: 58,,, | Performed by: OTOLARYNGOLOGY

## 2021-08-24 PROCEDURE — 71000033 HC RECOVERY, INTIAL HOUR: Performed by: OTOLARYNGOLOGY

## 2021-08-24 PROCEDURE — 71000015 HC POSTOP RECOV 1ST HR: Performed by: OTOLARYNGOLOGY

## 2021-08-24 PROCEDURE — 36000706: Performed by: OTOLARYNGOLOGY

## 2021-08-24 PROCEDURE — 27201423 OPTIME MED/SURG SUP & DEVICES STERILE SUPPLY: Performed by: OTOLARYNGOLOGY

## 2021-08-24 RX ORDER — TALC
6 POWDER (GRAM) TOPICAL NIGHTLY PRN
Status: DISCONTINUED | OUTPATIENT
Start: 2021-08-24 | End: 2021-08-25 | Stop reason: HOSPADM

## 2021-08-24 RX ORDER — MIDAZOLAM HYDROCHLORIDE 1 MG/ML
INJECTION, SOLUTION INTRAMUSCULAR; INTRAVENOUS
Status: DISCONTINUED | OUTPATIENT
Start: 2021-08-24 | End: 2021-08-24

## 2021-08-24 RX ORDER — BACITRACIN ZINC 500 [USP'U]/G
OINTMENT TOPICAL 2 TIMES DAILY
Status: DISCONTINUED | OUTPATIENT
Start: 2021-08-24 | End: 2021-08-25 | Stop reason: HOSPADM

## 2021-08-24 RX ORDER — SODIUM CHLORIDE 0.9 % (FLUSH) 0.9 %
10 SYRINGE (ML) INJECTION
Status: DISCONTINUED | OUTPATIENT
Start: 2021-08-24 | End: 2021-08-25 | Stop reason: HOSPADM

## 2021-08-24 RX ORDER — LIDOCAINE HYDROCHLORIDE 10 MG/ML
1 INJECTION, SOLUTION EPIDURAL; INFILTRATION; INTRACAUDAL; PERINEURAL ONCE
Status: DISCONTINUED | OUTPATIENT
Start: 2021-08-24 | End: 2021-08-24

## 2021-08-24 RX ORDER — ONDANSETRON 2 MG/ML
INJECTION INTRAMUSCULAR; INTRAVENOUS
Status: DISCONTINUED | OUTPATIENT
Start: 2021-08-24 | End: 2021-08-24

## 2021-08-24 RX ORDER — LIDOCAINE HYDROCHLORIDE 20 MG/ML
INJECTION, SOLUTION EPIDURAL; INFILTRATION; INTRACAUDAL; PERINEURAL
Status: DISCONTINUED | OUTPATIENT
Start: 2021-08-24 | End: 2021-08-24

## 2021-08-24 RX ORDER — IBUPROFEN 600 MG/1
600 TABLET ORAL EVERY 6 HOURS PRN
Status: DISCONTINUED | OUTPATIENT
Start: 2021-08-24 | End: 2021-08-25 | Stop reason: HOSPADM

## 2021-08-24 RX ORDER — HYDROCODONE BITARTRATE AND ACETAMINOPHEN 5; 325 MG/1; MG/1
1 TABLET ORAL EVERY 4 HOURS PRN
Status: DISCONTINUED | OUTPATIENT
Start: 2021-08-24 | End: 2021-08-25 | Stop reason: HOSPADM

## 2021-08-24 RX ORDER — LIDOCAINE HYDROCHLORIDE AND EPINEPHRINE 10; 10 MG/ML; UG/ML
INJECTION, SOLUTION INFILTRATION; PERINEURAL
Status: DISCONTINUED | OUTPATIENT
Start: 2021-08-24 | End: 2021-08-24 | Stop reason: HOSPADM

## 2021-08-24 RX ORDER — DEXAMETHASONE SODIUM PHOSPHATE 4 MG/ML
INJECTION, SOLUTION INTRA-ARTICULAR; INTRALESIONAL; INTRAMUSCULAR; INTRAVENOUS; SOFT TISSUE
Status: DISCONTINUED | OUTPATIENT
Start: 2021-08-24 | End: 2021-08-24

## 2021-08-24 RX ORDER — HALOPERIDOL 5 MG/ML
0.5 INJECTION INTRAMUSCULAR EVERY 10 MIN PRN
Status: DISCONTINUED | OUTPATIENT
Start: 2021-08-24 | End: 2021-08-24 | Stop reason: HOSPADM

## 2021-08-24 RX ORDER — ONDANSETRON 8 MG/1
8 TABLET, ORALLY DISINTEGRATING ORAL EVERY 8 HOURS PRN
Status: DISCONTINUED | OUTPATIENT
Start: 2021-08-24 | End: 2021-08-25 | Stop reason: HOSPADM

## 2021-08-24 RX ORDER — SODIUM CHLORIDE 0.9 % (FLUSH) 0.9 %
10 SYRINGE (ML) INJECTION
Status: DISCONTINUED | OUTPATIENT
Start: 2021-08-24 | End: 2021-08-24

## 2021-08-24 RX ORDER — ATORVASTATIN CALCIUM 20 MG/1
20 TABLET, FILM COATED ORAL DAILY
Status: DISCONTINUED | OUTPATIENT
Start: 2021-08-24 | End: 2021-08-25 | Stop reason: HOSPADM

## 2021-08-24 RX ORDER — AMLODIPINE BESYLATE 10 MG/1
10 TABLET ORAL DAILY
Status: DISCONTINUED | OUTPATIENT
Start: 2021-08-24 | End: 2021-08-25 | Stop reason: HOSPADM

## 2021-08-24 RX ORDER — LIDOCAINE HYDROCHLORIDE 10 MG/ML
1 INJECTION, SOLUTION EPIDURAL; INFILTRATION; INTRACAUDAL; PERINEURAL ONCE
Status: COMPLETED | OUTPATIENT
Start: 2021-08-24 | End: 2021-08-24

## 2021-08-24 RX ORDER — FENTANYL CITRATE 50 UG/ML
INJECTION, SOLUTION INTRAMUSCULAR; INTRAVENOUS
Status: DISCONTINUED | OUTPATIENT
Start: 2021-08-24 | End: 2021-08-24

## 2021-08-24 RX ORDER — ENOXAPARIN SODIUM 100 MG/ML
40 INJECTION SUBCUTANEOUS EVERY 24 HOURS
Status: DISCONTINUED | OUTPATIENT
Start: 2021-08-24 | End: 2021-08-25 | Stop reason: HOSPADM

## 2021-08-24 RX ORDER — ESCITALOPRAM OXALATE 10 MG/1
10 TABLET ORAL DAILY
Status: DISCONTINUED | OUTPATIENT
Start: 2021-08-24 | End: 2021-08-25 | Stop reason: HOSPADM

## 2021-08-24 RX ORDER — ASPIRIN 81 MG/1
81 TABLET ORAL DAILY
Status: DISCONTINUED | OUTPATIENT
Start: 2021-08-24 | End: 2021-08-25 | Stop reason: HOSPADM

## 2021-08-24 RX ORDER — ROCURONIUM BROMIDE 10 MG/ML
INJECTION, SOLUTION INTRAVENOUS
Status: DISCONTINUED | OUTPATIENT
Start: 2021-08-24 | End: 2021-08-24

## 2021-08-24 RX ORDER — ETOMIDATE 2 MG/ML
INJECTION INTRAVENOUS
Status: DISCONTINUED | OUTPATIENT
Start: 2021-08-24 | End: 2021-08-24

## 2021-08-24 RX ORDER — NOREPINEPHRINE BITARTRATE 1 MG/ML
INJECTION, SOLUTION INTRAVENOUS
Status: DISCONTINUED | OUTPATIENT
Start: 2021-08-24 | End: 2021-08-24

## 2021-08-24 RX ORDER — ACETAMINOPHEN 325 MG/1
650 TABLET ORAL EVERY 8 HOURS PRN
Status: DISCONTINUED | OUTPATIENT
Start: 2021-08-24 | End: 2021-08-25 | Stop reason: HOSPADM

## 2021-08-24 RX ORDER — HYDROMORPHONE HYDROCHLORIDE 1 MG/ML
0.2 INJECTION, SOLUTION INTRAMUSCULAR; INTRAVENOUS; SUBCUTANEOUS EVERY 5 MIN PRN
Status: DISCONTINUED | OUTPATIENT
Start: 2021-08-24 | End: 2021-08-24 | Stop reason: HOSPADM

## 2021-08-24 RX ADMIN — MIDAZOLAM 2 MG: 1 INJECTION INTRAMUSCULAR; INTRAVENOUS at 07:08

## 2021-08-24 RX ADMIN — DEXAMETHASONE SODIUM PHOSPHATE 4 MG: 4 INJECTION INTRA-ARTICULAR; INTRALESIONAL; INTRAMUSCULAR; INTRAVENOUS; SOFT TISSUE at 08:08

## 2021-08-24 RX ADMIN — AMLODIPINE BESYLATE 10 MG: 10 TABLET ORAL at 09:08

## 2021-08-24 RX ADMIN — BACITRACIN: 500 OINTMENT TOPICAL at 09:08

## 2021-08-24 RX ADMIN — NOREPINEPHRINE BITARTRATE 0.02 MCG: 1 INJECTION, SOLUTION, CONCENTRATE INTRAVENOUS at 08:08

## 2021-08-24 RX ADMIN — ESCITALOPRAM OXALATE 10 MG: 10 TABLET ORAL at 12:08

## 2021-08-24 RX ADMIN — ROCURONIUM BROMIDE 50 MG: 10 INJECTION INTRAVENOUS at 07:08

## 2021-08-24 RX ADMIN — SODIUM CHLORIDE 3 G: 9 INJECTION, SOLUTION INTRAVENOUS at 10:08

## 2021-08-24 RX ADMIN — SUGAMMADEX 200 MG: 100 INJECTION, SOLUTION INTRAVENOUS at 09:08

## 2021-08-24 RX ADMIN — HYDROMORPHONE HYDROCHLORIDE 0.2 MG: 1 INJECTION, SOLUTION INTRAMUSCULAR; INTRAVENOUS; SUBCUTANEOUS at 10:08

## 2021-08-24 RX ADMIN — NOREPINEPHRINE BITARTRATE 0.02 MCG: 1 INJECTION, SOLUTION, CONCENTRATE INTRAVENOUS at 07:08

## 2021-08-24 RX ADMIN — ASPIRIN 81 MG: 81 TABLET, COATED ORAL at 09:08

## 2021-08-24 RX ADMIN — ETOMIDATE 40 MG: 40 INJECTION, SOLUTION INTRAVENOUS at 07:08

## 2021-08-24 RX ADMIN — SODIUM CHLORIDE 3 G: 9 INJECTION, SOLUTION INTRAVENOUS at 09:08

## 2021-08-24 RX ADMIN — HYDROCODONE BITARTRATE AND ACETAMINOPHEN 1 TABLET: 5; 325 TABLET ORAL at 09:08

## 2021-08-24 RX ADMIN — FENTANYL CITRATE 100 MCG: 50 INJECTION INTRAMUSCULAR; INTRAVENOUS at 07:08

## 2021-08-24 RX ADMIN — ATORVASTATIN CALCIUM 20 MG: 20 TABLET, FILM COATED ORAL at 09:08

## 2021-08-24 RX ADMIN — ONDANSETRON 4 MG: 2 INJECTION INTRAMUSCULAR; INTRAVENOUS at 09:08

## 2021-08-24 RX ADMIN — ENOXAPARIN SODIUM 40 MG: 40 INJECTION SUBCUTANEOUS at 04:08

## 2021-08-24 RX ADMIN — HYDROCODONE BITARTRATE AND ACETAMINOPHEN 1 TABLET: 5; 325 TABLET ORAL at 06:08

## 2021-08-24 RX ADMIN — LIDOCAINE HYDROCHLORIDE 100 MG: 20 INJECTION, SOLUTION EPIDURAL; INFILTRATION; INTRACAUDAL at 07:08

## 2021-08-24 RX ADMIN — LIDOCAINE HYDROCHLORIDE 10 MG: 10 INJECTION, SOLUTION EPIDURAL; INFILTRATION; INTRACAUDAL; PERINEURAL at 10:08

## 2021-08-25 VITALS
SYSTOLIC BLOOD PRESSURE: 111 MMHG | DIASTOLIC BLOOD PRESSURE: 58 MMHG | TEMPERATURE: 98 F | BODY MASS INDEX: 19.15 KG/M2 | WEIGHT: 154 LBS | OXYGEN SATURATION: 97 % | HEART RATE: 63 BPM | RESPIRATION RATE: 17 BRPM | HEIGHT: 75 IN

## 2021-08-25 PROCEDURE — 25000003 PHARM REV CODE 250: Performed by: STUDENT IN AN ORGANIZED HEALTH CARE EDUCATION/TRAINING PROGRAM

## 2021-08-25 RX ORDER — HYDROCODONE BITARTRATE AND ACETAMINOPHEN 7.5; 325 MG/15ML; MG/15ML
15 SOLUTION ORAL EVERY 6 HOURS PRN
Qty: 473 ML | Refills: 0 | Status: SHIPPED | OUTPATIENT
Start: 2021-08-25 | End: 2021-09-07 | Stop reason: SDUPTHER

## 2021-08-25 RX ORDER — HYDROCODONE BITARTRATE AND ACETAMINOPHEN 7.5; 325 MG/15ML; MG/15ML
15 SOLUTION ORAL EVERY 6 HOURS PRN
Qty: 473 ML | Refills: 0 | Status: SHIPPED | OUTPATIENT
Start: 2021-08-25 | End: 2021-08-25 | Stop reason: SDUPTHER

## 2021-08-25 RX ORDER — BACITRACIN 500 [USP'U]/G
OINTMENT TOPICAL 2 TIMES DAILY
Qty: 1 TUBE | Refills: 0 | COMMUNITY
Start: 2021-08-25 | End: 2023-01-26

## 2021-08-25 RX ADMIN — ESCITALOPRAM OXALATE 10 MG: 10 TABLET ORAL at 08:08

## 2021-08-25 RX ADMIN — HYDROCODONE BITARTRATE AND ACETAMINOPHEN 1 TABLET: 5; 325 TABLET ORAL at 07:08

## 2021-08-25 RX ADMIN — BACITRACIN 1 EACH: 500 OINTMENT TOPICAL at 08:08

## 2021-08-25 RX ADMIN — ASPIRIN 81 MG: 81 TABLET, COATED ORAL at 08:08

## 2021-08-25 RX ADMIN — ATORVASTATIN CALCIUM 20 MG: 20 TABLET, FILM COATED ORAL at 08:08

## 2021-08-25 RX ADMIN — AMLODIPINE BESYLATE 10 MG: 10 TABLET ORAL at 08:08

## 2021-09-07 ENCOUNTER — OFFICE VISIT (OUTPATIENT)
Dept: OTOLARYNGOLOGY | Facility: CLINIC | Age: 56
End: 2021-09-07
Payer: MEDICAID

## 2021-09-07 ENCOUNTER — TELEPHONE (OUTPATIENT)
Dept: SURGICAL ONCOLOGY | Facility: CLINIC | Age: 56
End: 2021-09-07

## 2021-09-07 VITALS
WEIGHT: 163.13 LBS | SYSTOLIC BLOOD PRESSURE: 104 MMHG | DIASTOLIC BLOOD PRESSURE: 62 MMHG | HEART RATE: 62 BPM | BODY MASS INDEX: 20.39 KG/M2

## 2021-09-07 DIAGNOSIS — C44.01 BASAL CELL CARCINOMA (BCC) OF UPPER LIP: Primary | ICD-10-CM

## 2021-09-07 PROCEDURE — 99024 PR POST-OP FOLLOW-UP VISIT: ICD-10-PCS | Mod: ,,, | Performed by: STUDENT IN AN ORGANIZED HEALTH CARE EDUCATION/TRAINING PROGRAM

## 2021-09-07 PROCEDURE — 99999 PR PBB SHADOW E&M-EST. PATIENT-LVL III: ICD-10-PCS | Mod: PBBFAC,,, | Performed by: STUDENT IN AN ORGANIZED HEALTH CARE EDUCATION/TRAINING PROGRAM

## 2021-09-07 PROCEDURE — 99213 OFFICE O/P EST LOW 20 MIN: CPT | Mod: PBBFAC | Performed by: STUDENT IN AN ORGANIZED HEALTH CARE EDUCATION/TRAINING PROGRAM

## 2021-09-07 PROCEDURE — 99024 POSTOP FOLLOW-UP VISIT: CPT | Mod: ,,, | Performed by: STUDENT IN AN ORGANIZED HEALTH CARE EDUCATION/TRAINING PROGRAM

## 2021-09-07 PROCEDURE — 99999 PR PBB SHADOW E&M-EST. PATIENT-LVL III: CPT | Mod: PBBFAC,,, | Performed by: STUDENT IN AN ORGANIZED HEALTH CARE EDUCATION/TRAINING PROGRAM

## 2021-09-07 RX ORDER — HYDROCODONE BITARTRATE AND ACETAMINOPHEN 7.5; 325 MG/15ML; MG/15ML
15 SOLUTION ORAL EVERY 6 HOURS PRN
Qty: 473 ML | Refills: 0 | Status: ON HOLD | OUTPATIENT
Start: 2021-09-07 | End: 2021-10-08 | Stop reason: HOSPADM

## 2021-09-24 DIAGNOSIS — C44.01 BASAL CELL CARCINOMA (BCC) OF UPPER LIP: Primary | ICD-10-CM

## 2021-09-24 DIAGNOSIS — C44.91 BASAL CELL CARCINOMA: ICD-10-CM

## 2021-09-24 RX ORDER — LIDOCAINE HYDROCHLORIDE 10 MG/ML
1 INJECTION, SOLUTION EPIDURAL; INFILTRATION; INTRACAUDAL; PERINEURAL ONCE
Status: CANCELLED | OUTPATIENT
Start: 2021-09-24 | End: 2021-09-24

## 2021-09-24 RX ORDER — SODIUM CHLORIDE 0.9 % (FLUSH) 0.9 %
10 SYRINGE (ML) INJECTION
Status: CANCELLED | OUTPATIENT
Start: 2021-09-24

## 2021-10-07 ENCOUNTER — ANESTHESIA EVENT (OUTPATIENT)
Dept: SURGERY | Facility: HOSPITAL | Age: 56
End: 2021-10-07
Payer: MEDICAID

## 2021-10-07 ENCOUNTER — TELEPHONE (OUTPATIENT)
Dept: OTOLARYNGOLOGY | Facility: CLINIC | Age: 56
End: 2021-10-07

## 2021-10-08 ENCOUNTER — HOSPITAL ENCOUNTER (OUTPATIENT)
Facility: HOSPITAL | Age: 56
Discharge: HOME | End: 2021-10-08
Attending: OTOLARYNGOLOGY | Admitting: OTOLARYNGOLOGY
Payer: MEDICAID

## 2021-10-08 ENCOUNTER — ANESTHESIA (OUTPATIENT)
Dept: SURGERY | Facility: HOSPITAL | Age: 56
End: 2021-10-08
Payer: MEDICAID

## 2021-10-08 VITALS
DIASTOLIC BLOOD PRESSURE: 60 MMHG | HEIGHT: 75 IN | OXYGEN SATURATION: 99 % | TEMPERATURE: 99 F | WEIGHT: 163.13 LBS | RESPIRATION RATE: 15 BRPM | SYSTOLIC BLOOD PRESSURE: 127 MMHG | HEART RATE: 66 BPM | BODY MASS INDEX: 20.28 KG/M2

## 2021-10-08 DIAGNOSIS — C44.91 BASAL CELL CARCINOMA: Primary | ICD-10-CM

## 2021-10-08 DIAGNOSIS — C44.01 BASAL CELL CARCINOMA (BCC) OF UPPER LIP: ICD-10-CM

## 2021-10-08 PROCEDURE — 71000015 HC POSTOP RECOV 1ST HR: Performed by: OTOLARYNGOLOGY

## 2021-10-08 PROCEDURE — 15630 DELAY FLAP EYE/NOS/EAR/LIP: CPT | Mod: 58,,, | Performed by: OTOLARYNGOLOGY

## 2021-10-08 PROCEDURE — 36000707: Performed by: OTOLARYNGOLOGY

## 2021-10-08 PROCEDURE — 36000706: Performed by: OTOLARYNGOLOGY

## 2021-10-08 PROCEDURE — 63600175 PHARM REV CODE 636 W HCPCS: Performed by: STUDENT IN AN ORGANIZED HEALTH CARE EDUCATION/TRAINING PROGRAM

## 2021-10-08 PROCEDURE — 37000008 HC ANESTHESIA 1ST 15 MINUTES: Performed by: OTOLARYNGOLOGY

## 2021-10-08 PROCEDURE — 25000003 PHARM REV CODE 250: Performed by: STUDENT IN AN ORGANIZED HEALTH CARE EDUCATION/TRAINING PROGRAM

## 2021-10-08 PROCEDURE — 63600175 PHARM REV CODE 636 W HCPCS: Performed by: OTOLARYNGOLOGY

## 2021-10-08 PROCEDURE — 00300 ANES ALL PX INTEG H/N/PTRUNK: CPT | Performed by: OTOLARYNGOLOGY

## 2021-10-08 PROCEDURE — 37000009 HC ANESTHESIA EA ADD 15 MINS: Performed by: OTOLARYNGOLOGY

## 2021-10-08 PROCEDURE — D9220A PRA ANESTHESIA: ICD-10-PCS | Mod: ,,, | Performed by: ANESTHESIOLOGY

## 2021-10-08 PROCEDURE — 71000044 HC DOSC ROUTINE RECOVERY FIRST HOUR: Performed by: OTOLARYNGOLOGY

## 2021-10-08 PROCEDURE — 71000016 HC POSTOP RECOV ADDL HR: Performed by: OTOLARYNGOLOGY

## 2021-10-08 PROCEDURE — 15630 PR DELAY/SECTN FLAP LID,NOS,EAR,LIP: ICD-10-PCS | Mod: 58,,, | Performed by: OTOLARYNGOLOGY

## 2021-10-08 PROCEDURE — 25000003 PHARM REV CODE 250: Performed by: OTOLARYNGOLOGY

## 2021-10-08 PROCEDURE — D9220A PRA ANESTHESIA: Mod: ,,, | Performed by: ANESTHESIOLOGY

## 2021-10-08 RX ORDER — LIDOCAINE HYDROCHLORIDE 10 MG/ML
1 INJECTION, SOLUTION EPIDURAL; INFILTRATION; INTRACAUDAL; PERINEURAL ONCE
Status: DISCONTINUED | OUTPATIENT
Start: 2021-10-08 | End: 2021-10-08 | Stop reason: HOSPADM

## 2021-10-08 RX ORDER — DEXMEDETOMIDINE HYDROCHLORIDE 100 UG/ML
INJECTION, SOLUTION INTRAVENOUS
Status: DISCONTINUED | OUTPATIENT
Start: 2021-10-08 | End: 2021-10-08

## 2021-10-08 RX ORDER — HYDROMORPHONE HYDROCHLORIDE 1 MG/ML
0.2 INJECTION, SOLUTION INTRAMUSCULAR; INTRAVENOUS; SUBCUTANEOUS EVERY 5 MIN PRN
Status: DISCONTINUED | OUTPATIENT
Start: 2021-10-08 | End: 2021-10-08 | Stop reason: HOSPADM

## 2021-10-08 RX ORDER — SODIUM CHLORIDE 9 MG/ML
INJECTION, SOLUTION INTRAVENOUS CONTINUOUS PRN
Status: DISCONTINUED | OUTPATIENT
Start: 2021-10-08 | End: 2021-10-08

## 2021-10-08 RX ORDER — SODIUM CHLORIDE 0.9 % (FLUSH) 0.9 %
10 SYRINGE (ML) INJECTION
Status: DISCONTINUED | OUTPATIENT
Start: 2021-10-08 | End: 2021-10-08 | Stop reason: HOSPADM

## 2021-10-08 RX ORDER — LIDOCAINE HYDROCHLORIDE AND EPINEPHRINE 10; 10 MG/ML; UG/ML
INJECTION, SOLUTION INFILTRATION; PERINEURAL
Status: DISCONTINUED | OUTPATIENT
Start: 2021-10-08 | End: 2021-10-08 | Stop reason: HOSPADM

## 2021-10-08 RX ORDER — HYDROCODONE BITARTRATE AND ACETAMINOPHEN 7.5; 325 MG/1; MG/1
1 TABLET ORAL EVERY 6 HOURS PRN
Status: DISCONTINUED | OUTPATIENT
Start: 2021-10-08 | End: 2021-10-08 | Stop reason: HOSPADM

## 2021-10-08 RX ORDER — HYDROCODONE BITARTRATE AND ACETAMINOPHEN 7.5; 325 MG/1; MG/1
1 TABLET ORAL EVERY 6 HOURS PRN
Qty: 10 TABLET | Refills: 0 | Status: SHIPPED | OUTPATIENT
Start: 2021-10-08 | End: 2021-11-04

## 2021-10-08 RX ORDER — HALOPERIDOL 5 MG/ML
0.5 INJECTION INTRAMUSCULAR EVERY 10 MIN PRN
Status: DISCONTINUED | OUTPATIENT
Start: 2021-10-08 | End: 2021-10-08 | Stop reason: HOSPADM

## 2021-10-08 RX ORDER — PROPOFOL 10 MG/ML
VIAL (ML) INTRAVENOUS CONTINUOUS PRN
Status: DISCONTINUED | OUTPATIENT
Start: 2021-10-08 | End: 2021-10-08

## 2021-10-08 RX ORDER — MIDAZOLAM HYDROCHLORIDE 1 MG/ML
INJECTION INTRAMUSCULAR; INTRAVENOUS
Status: DISCONTINUED | OUTPATIENT
Start: 2021-10-08 | End: 2021-10-08

## 2021-10-08 RX ORDER — CEFAZOLIN SODIUM 1 G/3ML
2 INJECTION, POWDER, FOR SOLUTION INTRAMUSCULAR; INTRAVENOUS
Status: COMPLETED | OUTPATIENT
Start: 2021-10-08 | End: 2021-10-08

## 2021-10-08 RX ORDER — EPHEDRINE SULFATE 50 MG/ML
INJECTION, SOLUTION INTRAVENOUS
Status: DISCONTINUED | OUTPATIENT
Start: 2021-10-08 | End: 2021-10-08

## 2021-10-08 RX ORDER — ONDANSETRON 4 MG/1
4 TABLET, ORALLY DISINTEGRATING ORAL EVERY 6 HOURS PRN
Status: DISCONTINUED | OUTPATIENT
Start: 2021-10-08 | End: 2021-10-08 | Stop reason: HOSPADM

## 2021-10-08 RX ORDER — FENTANYL CITRATE 50 UG/ML
INJECTION, SOLUTION INTRAMUSCULAR; INTRAVENOUS
Status: DISCONTINUED | OUTPATIENT
Start: 2021-10-08 | End: 2021-10-08

## 2021-10-08 RX ORDER — PROPOFOL 10 MG/ML
VIAL (ML) INTRAVENOUS
Status: DISCONTINUED | OUTPATIENT
Start: 2021-10-08 | End: 2021-10-08

## 2021-10-08 RX ORDER — PHENYLEPHRINE HYDROCHLORIDE 10 MG/ML
INJECTION INTRAVENOUS
Status: DISCONTINUED | OUTPATIENT
Start: 2021-10-08 | End: 2021-10-08

## 2021-10-08 RX ADMIN — CEFAZOLIN 2 G: 330 INJECTION, POWDER, FOR SOLUTION INTRAMUSCULAR; INTRAVENOUS at 07:10

## 2021-10-08 RX ADMIN — DEXMEDETOMIDINE HYDROCHLORIDE 8 MCG: 100 INJECTION, SOLUTION INTRAVENOUS at 07:10

## 2021-10-08 RX ADMIN — EPHEDRINE SULFATE 5 MG: 50 INJECTION INTRAVENOUS at 07:10

## 2021-10-08 RX ADMIN — PHENYLEPHRINE HYDROCHLORIDE 50 MCG: 10 INJECTION INTRAVENOUS at 07:10

## 2021-10-08 RX ADMIN — PROPOFOL 50 MCG/KG/MIN: 10 INJECTION, EMULSION INTRAVENOUS at 07:10

## 2021-10-08 RX ADMIN — FENTANYL CITRATE 50 MCG: 50 INJECTION, SOLUTION INTRAMUSCULAR; INTRAVENOUS at 07:10

## 2021-10-08 RX ADMIN — PROPOFOL 10 MG: 10 INJECTION, EMULSION INTRAVENOUS at 07:10

## 2021-10-08 RX ADMIN — DEXMEDETOMIDINE HYDROCHLORIDE 4 MCG: 100 INJECTION, SOLUTION INTRAVENOUS at 07:10

## 2021-10-08 RX ADMIN — MIDAZOLAM HYDROCHLORIDE 2 MG: 1 INJECTION, SOLUTION INTRAMUSCULAR; INTRAVENOUS at 07:10

## 2021-10-08 RX ADMIN — SODIUM CHLORIDE: 0.9 INJECTION, SOLUTION INTRAVENOUS at 06:10

## 2021-11-02 ENCOUNTER — TELEPHONE (OUTPATIENT)
Dept: OTOLARYNGOLOGY | Facility: CLINIC | Age: 56
End: 2021-11-02
Payer: MEDICAID

## 2021-11-04 ENCOUNTER — OFFICE VISIT (OUTPATIENT)
Dept: OTOLARYNGOLOGY | Facility: CLINIC | Age: 56
End: 2021-11-04
Payer: MEDICAID

## 2021-11-04 VITALS — BODY MASS INDEX: 20.5 KG/M2 | WEIGHT: 164 LBS

## 2021-11-04 DIAGNOSIS — C44.01 BASAL CELL CARCINOMA (BCC) OF SKIN OF LIP: Primary | ICD-10-CM

## 2021-11-04 PROCEDURE — 99024 PR POST-OP FOLLOW-UP VISIT: ICD-10-PCS | Mod: ,,, | Performed by: NURSE PRACTITIONER

## 2021-11-04 PROCEDURE — 99213 OFFICE O/P EST LOW 20 MIN: CPT | Mod: PBBFAC | Performed by: NURSE PRACTITIONER

## 2021-11-04 PROCEDURE — 99999 PR PBB SHADOW E&M-EST. PATIENT-LVL III: CPT | Mod: PBBFAC,,, | Performed by: NURSE PRACTITIONER

## 2021-11-04 PROCEDURE — 99999 PR PBB SHADOW E&M-EST. PATIENT-LVL III: ICD-10-PCS | Mod: PBBFAC,,, | Performed by: NURSE PRACTITIONER

## 2021-11-04 PROCEDURE — 99024 POSTOP FOLLOW-UP VISIT: CPT | Mod: ,,, | Performed by: NURSE PRACTITIONER

## 2021-11-04 RX ORDER — HYDROCODONE BITARTRATE AND ACETAMINOPHEN 7.5; 325 MG/15ML; MG/15ML
15 SOLUTION ORAL EVERY 6 HOURS PRN
Qty: 400 ML | Refills: 0 | Status: SHIPPED | OUTPATIENT
Start: 2021-11-04 | End: 2023-01-26

## 2021-11-04 RX ORDER — LIDOCAINE HYDROCHLORIDE 20 MG/ML
SOLUTION OROPHARYNGEAL
Qty: 100 ML | Refills: 2 | Status: SHIPPED | OUTPATIENT
Start: 2021-11-04 | End: 2023-01-26

## 2023-05-12 PROBLEM — I21.4 NSTEMI (NON-ST ELEVATED MYOCARDIAL INFARCTION): Status: ACTIVE | Noted: 2023-05-12

## 2023-05-12 PROBLEM — I34.0 SEVERE MITRAL REGURGITATION: Status: ACTIVE | Noted: 2023-05-12

## 2023-05-15 ENCOUNTER — TELEPHONE (OUTPATIENT)
Dept: CARDIOLOGY | Facility: CLINIC | Age: 58
End: 2023-05-15
Payer: MEDICAID

## 2023-05-15 NOTE — TELEPHONE ENCOUNTER
Spoke with Kerri, scheduled hospital follow up for patient within 2 weeks after angiogram on 05/12/2023.  Kerri verbalized understanding.

## 2023-05-15 NOTE — TELEPHONE ENCOUNTER
----- Message from Radhika Garduno sent at 5/15/2023 11:59 AM CDT -----  Regarding: Appt Access  Contact: Kerri [phone]  Kerri /father in law Called to schedule follow up visit from hospital stay on 5/1. Pls call

## 2023-05-25 ENCOUNTER — TELEPHONE (OUTPATIENT)
Dept: CARDIOLOGY | Facility: CLINIC | Age: 58
End: 2023-05-25
Payer: MEDICAID

## 2023-05-25 NOTE — TELEPHONE ENCOUNTER
----- Message from Araseli Contreras sent at 5/25/2023 12:23 PM CDT -----  Contact: Kerri  Pt daughter in law requesting call back RE: would like to r/s today's appt, I offered 5/30 as next available she declined . Would like sooner appt due to 5/30 being outside of the 2 week fu    Confirmed contact below:  Contact Name:Kerri   Phone Number:[phone]

## 2023-05-25 NOTE — TELEPHONE ENCOUNTER
Spoke with patient, scheduled next available hospital follow up appointment with provider.  Patient verbalized understanding.

## 2023-05-31 ENCOUNTER — TELEPHONE (OUTPATIENT)
Dept: CARDIOLOGY | Facility: CLINIC | Age: 58
End: 2023-05-31
Payer: MEDICAID

## 2023-05-31 NOTE — TELEPHONE ENCOUNTER
----- Message from Adam Forman sent at 5/31/2023 11:50 AM CDT -----  Contact: [phone]  Pt is calling because he missed his appt yesterday due to not having a ride due to a death in the family. He was trying to reschedule. Please call back to further assist.

## 2023-06-05 ENCOUNTER — OFFICE VISIT (OUTPATIENT)
Dept: CARDIOLOGY | Facility: CLINIC | Age: 58
End: 2023-06-05
Payer: MEDICAID

## 2023-06-05 VITALS
HEIGHT: 75 IN | BODY MASS INDEX: 18.16 KG/M2 | HEART RATE: 74 BPM | DIASTOLIC BLOOD PRESSURE: 72 MMHG | WEIGHT: 146.06 LBS | SYSTOLIC BLOOD PRESSURE: 148 MMHG | OXYGEN SATURATION: 99 %

## 2023-06-05 DIAGNOSIS — I35.1 AORTIC VALVE INSUFFICIENCY, ETIOLOGY OF CARDIAC VALVE DISEASE UNSPECIFIED: ICD-10-CM

## 2023-06-05 DIAGNOSIS — I70.1 RENAL ARTERY STENOSIS: ICD-10-CM

## 2023-06-05 DIAGNOSIS — I77.1 SUBCLAVIAN ARTERY STENOSIS: ICD-10-CM

## 2023-06-05 DIAGNOSIS — I34.0 SEVERE MITRAL REGURGITATION: ICD-10-CM

## 2023-06-05 DIAGNOSIS — I35.0 AORTIC VALVE STENOSIS, ETIOLOGY OF CARDIAC VALVE DISEASE UNSPECIFIED: ICD-10-CM

## 2023-06-05 DIAGNOSIS — I10 ESSENTIAL HYPERTENSION: Chronic | ICD-10-CM

## 2023-06-05 DIAGNOSIS — I25.10 CORONARY ARTERY DISEASE INVOLVING NATIVE CORONARY ARTERY OF NATIVE HEART WITHOUT ANGINA PECTORIS: Primary | ICD-10-CM

## 2023-06-05 PROBLEM — I21.4 NSTEMI (NON-ST ELEVATED MYOCARDIAL INFARCTION): Status: RESOLVED | Noted: 2023-05-12 | Resolved: 2023-06-05

## 2023-06-05 PROCEDURE — 99999 PR PBB SHADOW E&M-EST. PATIENT-LVL III: CPT | Mod: PBBFAC,,, | Performed by: INTERNAL MEDICINE

## 2023-06-05 PROCEDURE — 1111F PR DISCHARGE MEDS RECONCILED W/ CURRENT OUTPATIENT MED LIST: ICD-10-PCS | Mod: CPTII,,, | Performed by: INTERNAL MEDICINE

## 2023-06-05 PROCEDURE — 3078F DIAST BP <80 MM HG: CPT | Mod: CPTII,,, | Performed by: INTERNAL MEDICINE

## 2023-06-05 PROCEDURE — 99213 OFFICE O/P EST LOW 20 MIN: CPT | Mod: PBBFAC,PN | Performed by: INTERNAL MEDICINE

## 2023-06-05 PROCEDURE — 3078F PR MOST RECENT DIASTOLIC BLOOD PRESSURE < 80 MM HG: ICD-10-PCS | Mod: CPTII,,, | Performed by: INTERNAL MEDICINE

## 2023-06-05 PROCEDURE — 99214 PR OFFICE/OUTPT VISIT, EST, LEVL IV, 30-39 MIN: ICD-10-PCS | Mod: S$PBB,,, | Performed by: INTERNAL MEDICINE

## 2023-06-05 PROCEDURE — 3077F PR MOST RECENT SYSTOLIC BLOOD PRESSURE >= 140 MM HG: ICD-10-PCS | Mod: CPTII,,, | Performed by: INTERNAL MEDICINE

## 2023-06-05 PROCEDURE — 4010F PR ACE/ARB THEARPY RXD/TAKEN: ICD-10-PCS | Mod: CPTII,,, | Performed by: INTERNAL MEDICINE

## 2023-06-05 PROCEDURE — 99214 OFFICE O/P EST MOD 30 MIN: CPT | Mod: S$PBB,,, | Performed by: INTERNAL MEDICINE

## 2023-06-05 PROCEDURE — 1160F RVW MEDS BY RX/DR IN RCRD: CPT | Mod: CPTII,,, | Performed by: INTERNAL MEDICINE

## 2023-06-05 PROCEDURE — 4010F ACE/ARB THERAPY RXD/TAKEN: CPT | Mod: CPTII,,, | Performed by: INTERNAL MEDICINE

## 2023-06-05 PROCEDURE — 1160F PR REVIEW ALL MEDS BY PRESCRIBER/CLIN PHARMACIST DOCUMENTED: ICD-10-PCS | Mod: CPTII,,, | Performed by: INTERNAL MEDICINE

## 2023-06-05 PROCEDURE — 1159F MED LIST DOCD IN RCRD: CPT | Mod: CPTII,,, | Performed by: INTERNAL MEDICINE

## 2023-06-05 PROCEDURE — 1159F PR MEDICATION LIST DOCUMENTED IN MEDICAL RECORD: ICD-10-PCS | Mod: CPTII,,, | Performed by: INTERNAL MEDICINE

## 2023-06-05 PROCEDURE — 3077F SYST BP >= 140 MM HG: CPT | Mod: CPTII,,, | Performed by: INTERNAL MEDICINE

## 2023-06-05 PROCEDURE — 3008F BODY MASS INDEX DOCD: CPT | Mod: CPTII,,, | Performed by: INTERNAL MEDICINE

## 2023-06-05 PROCEDURE — 1111F DSCHRG MED/CURRENT MED MERGE: CPT | Mod: CPTII,,, | Performed by: INTERNAL MEDICINE

## 2023-06-05 PROCEDURE — 3008F PR BODY MASS INDEX (BMI) DOCUMENTED: ICD-10-PCS | Mod: CPTII,,, | Performed by: INTERNAL MEDICINE

## 2023-06-05 PROCEDURE — 99999 PR PBB SHADOW E&M-EST. PATIENT-LVL III: ICD-10-PCS | Mod: PBBFAC,,, | Performed by: INTERNAL MEDICINE

## 2023-06-05 NOTE — PROGRESS NOTES
[facility] [address]  Cardiology Clinic Note      Chief Complaint  Chief Complaint   Patient presents with    Hospital Follow Up     Angiogram 5/12/2023       HPI:    57-year-old male with past medical history tobacco use, pulmonary nodule CT 2020, hypertension, hyperlipidemia, coronary artery disease angiogram 2015 mid LAD 60% proximal circumflex 50% OM 2 60% proximal right coronary artery 70%  mid PLB 70%, renal artery stenosis? Normal by angiogram 2015 subsequent coronary angiogram 05/2023 for NSTEMI possible culprit ostial inferior branch bifid OM2 OM1 diminutive KURT 3 flow residual disease moderate mid LAD lesion and RCA  with robust collateralization from the left system FFR not available to assess the LAD due to supply chain issues no intervention performed due to possible surgical valvular disease, concern for subclavian steal by ultrasound then CTA 2020 50% stenosis of left subclavian artery proximal to the vertebral artery origin, echo 05/2023 EF estimated 50-55% mildly dilated LV chamber size diastolic function indeterminate severe left atrial enlargement severe mitral regurgitation moderate to severe aortic regurgitation moderate to severe aortic stenosis peak velocity 4 mean gradient 35 normal RV CVP indeterminate prior stress echo 2015 normal EF normal LV diastolic function normal RV basal inferior septum and inferior wall hypokinetic with stress    Recent hospitalization for NSTEMI no treatment due to valvular disease status possibly surgical   OM branch culprit as above with RCA  and moderate mid LAD lesion unable to perform FFR due to the fact that there was no wire available because of supply chain issues  Of note, history of left subclavian stenosis    The patient's symptoms include dyspnea on exertion with 1 block  He is relatively sedentary    Medications  Current Outpatient Medications   Medication Sig Dispense Refill    aspirin 81 MG Chew Take 1 tablet (81 mg total) by mouth  once daily. 90 tablet 3    atorvastatin (LIPITOR) 80 MG tablet Take 1 tablet (80 mg total) by mouth once daily. 90 tablet 3    furosemide (LASIX) 40 MG tablet Take 1 tablet (40 mg total) by mouth once daily. 30 tablet 0    losartan (COZAAR) 25 MG tablet Take 1 tablet (25 mg total) by mouth once daily. 90 tablet 3    nicotine (NICODERM CQ) 21 mg/24 hr Place 1 patch onto the skin once daily. 28 patch 0    EScitalopram oxalate (LEXAPRO) 10 MG tablet Take 1 tablet (10 mg total) by mouth every evening. 90 tablet 0    hydrocortisone 2.5 % cream Apply topically 2 (two) times daily. 60 g 0    hydrOXYzine pamoate (VISTARIL) 25 MG Cap Take 1 capsule (25 mg total) by mouth 4 (four) times daily. 40 capsule 0     No current facility-administered medications for this visit.        History  Past Medical History:   Diagnosis Date    Basal cell carcinoma (BCC) of upper lip 6/20/2021    Coronary artery disease involving native coronary artery without angina pectoris 11/12/2015    Hyperlipidemia 11/12/2015    Hypertension      Past Surgical History:   Procedure Laterality Date    CORONARY ANGIOGRAPHY  2015    CORONARY ANGIOGRAPHY Right 5/12/2023    Procedure: ANGIOGRAM, CORONARY ARTERY;  Surgeon: Ryan Huertas MD;  Location: [facility] CATH LAB;  Service: Cardiology;  Laterality: Right;    EXCISION OF LESION OF LIP N/A 07/23/2021    Procedure: EXCISION, LESION, LIP;  Surgeon: Cezar Ellis MD;  Location: [address];  Service: ENT;  Laterality: N/A;    FLAP PROCEDURE N/A 08/12/2021    Procedure: CREATION, FREE FLAP;  Surgeon: Cezar Ellis MD;  [facility];  Service: ENT;  Laterality: N/A;  Radial forearm    FOOT SURGERY      LIP RECONSTRUCTION Bilateral 08/24/2021    Procedure: RECONSTRUCTION, LIP;  Surgeon: Cezar Ellis MD;  Location: [address];  Service: ENT;  Laterality: Bilateral;    LIP RECONSTRUCTION N/A 10/08/2021    Procedure: RECONSTRUCTION, LIP;  Surgeon: Cezar Ellis MD;   Location: [facility] OR 2ND FLR;  Service: ENT;  Laterality: N/A;    NECK EXPLORATION Left 08/12/2021    Procedure: EXPLORATION, NECK;  Surgeon: Cezar Ellis MD;  Location: [facility] OR 2ND FLR;  Service: ENT;  Laterality: Left;    ROTATION FLAP SURGERY  07/23/2021    Procedure: CREATION, FLAP, ROTATION;  Surgeon: Cezar Ellis MD;  Location: [facility] OR [facility] FLR;  Service: ENT;;    SKIN SPLIT GRAFT Left 08/12/2021    Procedure: APPLICATION, GRAFT, SKIN, SPLIT-THICKNESS;  Surgeon: Cezar Ellis MD;  Location: [facility] OR [facility] FLR;  Service: ENT;  Laterality: Left;     Social History     Socioeconomic History    Marital status:    Occupational History    Occupation: [employer]   Tobacco Use    Smoking status: Every Day     Packs/day: 0.50     Types: Cigarettes    Smokeless tobacco: Never   Substance and Sexual Activity    Alcohol use: Not Currently    Drug use: Yes     Types: Marijuana     Social Determinants of Health     Financial Resource Strain: Medium Risk    Difficulty of Paying Living Expenses: Somewhat hard   Food Insecurity: Food Insecurity Present    Worried About Running Out of Food in the Last Year: Sometimes true    Ran Out of Food in the Last Year: Never true   Transportation Needs: No Transportation Needs    Lack of Transportation (Medical): No    Lack of Transportation (Non-Medical): No   Stress: Stress Concern Present    Feeling of Stress : To some extent   Social Connections: Socially Isolated    Frequency of Communication with Friends and Family: More than three times a week    Frequency of Social Gatherings with Friends and Family: More than three times a week    Attends Mosque Services: Never    Active Member of Clubs or Organizations: No    Attends Club or Organization Meetings: Never    Marital Status:    Housing Stability: Low Risk     Unable to Pay for Housing in the Last Year: No    Number of Places Lived in the Last Year: 1    Unstable Housing in the Last Year:  No     Family History   Problem Relation Age of Onset    Heart disease Father     No Known Problems Mother     No Known Problems Sister     No Known Problems Maternal Grandmother     No Known Problems Maternal Grandfather     No Known Problems Paternal Grandmother     No Known Problems Paternal Grandfather     No Known Problems Brother     No Known Problems Maternal Aunt     No Known Problems Maternal Uncle     No Known Problems Paternal Aunt     No Known Problems Paternal Uncle     Anemia Neg Hx     Arrhythmia Neg Hx     Asthma Neg Hx     Clotting disorder Neg Hx     Fainting Neg Hx     Heart attack Neg Hx     Heart failure Neg Hx     Hyperlipidemia Neg Hx     Hypertension Neg Hx     Stroke Neg Hx     Atrial Septal Defect Neg Hx         Allergies  Review of patient's allergies indicates:  No Known Allergies    Review of Systems   Review of Systems   Constitutional: Negative for fever.   HENT:  Negative for nosebleeds.    Eyes:  Negative for visual disturbance.   Cardiovascular:  Positive for dyspnea on exertion. Negative for chest pain, claudication, palpitations and syncope.   Respiratory:  Negative for cough, hemoptysis and wheezing.    Endocrine: Negative for cold intolerance, heat intolerance, polyphagia and polyuria.   Hematologic/Lymphatic: Negative for bleeding problem.   Skin:  Negative for rash.   Musculoskeletal:  Negative for myalgias.   Gastrointestinal:  Negative for hematemesis, hematochezia, nausea and vomiting.   Genitourinary:  Negative for dysuria.   Neurological:  Negative for focal weakness and sensory change.   Psychiatric/Behavioral:  Negative for altered mental status.      Physical Exam  Vitals:    06/05/23 0906   BP: (!) 148/72   Pulse: 74     Wt Readings from Last 1 Encounters:   06/05/23 66.3 kg (146 lb 0.9 oz)     Physical Exam  HENT:      Head: Normocephalic and atraumatic.      Mouth/Throat:      Mouth: Mucous membranes are moist.   Eyes:      Extraocular Movements: Extraocular  movements intact.      Pupils: Pupils are equal, round, and reactive to light.   Neck:      Vascular: No carotid bruit or JVD.   Cardiovascular:      Rate and Rhythm: Normal rate and regular rhythm.      Pulses: Normal pulses and intact distal pulses.      Heart sounds: Murmur heard.   Systolic murmur is present with a grade of 3/6.   Diastolic murmur is present with a grade of 1/4.     No friction rub. No gallop.   Pulmonary:      Effort: Pulmonary effort is normal.      Breath sounds: Normal breath sounds.   Abdominal:      Tenderness: There is no abdominal tenderness. There is no guarding or rebound.   Musculoskeletal:      Right lower leg: No edema.      Left lower leg: No edema.   Skin:     General: Skin is warm and dry.      Capillary Refill: Capillary refill takes less than 2 seconds.   Neurological:      General: No focal deficit present.      Mental Status: He is alert and oriented to person, place, and time.   Psychiatric:         Mood and Affect: Mood normal.       Labs  No results displayed because visit has over 200 results.      Admission on 03/09/2023, Discharged on 03/09/2023   Component Date Value Ref Range Status    Sodium 03/09/2023 138  136 - 145 mmol/L Final    Potassium 03/09/2023 4.0  3.5 - 5.1 mmol/L Final    Chloride 03/09/2023 102  95 - 110 mmol/L Final    CO2 03/09/2023 26  23 - 29 mmol/L Final    Glucose 03/09/2023 94  70 - 110 mg/dL Final    BUN 03/09/2023 8  6 - 20 mg/dL Final    Creatinine 03/09/2023 1.1  0.5 - 1.4 mg/dL Final    Calcium 03/09/2023 10.1  8.7 - 10.5 mg/dL Final    Total Protein 03/09/2023 7.0  6.0 - 8.4 g/dL Final    Albumin 03/09/2023 4.3  3.5 - 5.2 g/dL Final    Total Bilirubin 03/09/2023 1.0  0.1 - 1.0 mg/dL Final    Comment: For infants and newborns, interpretation of results should be based  on gestational age, weight and in agreement with clinical  observations.    Premature Infant recommended reference ranges:  Up to 24 hours.............<8.0 mg/dL  Up to 48  hours............<12.0 mg/dL  3-5 days..................<15.0 mg/dL  6-29 days.................<15.0 mg/dL      Alkaline Phosphatase 03/09/2023 81  55 - 135 U/L Final    AST 03/09/2023 15  10 - 40 U/L Final    ALT 03/09/2023 8 (L)  10 - 44 U/L Final    Anion Gap 03/09/2023 10  8 - 16 mmol/L Final    eGFR 03/09/2023 >60.0  >60 mL/min/1.73 m^2 Final    WBC 03/09/2023 6.09  3.90 - 12.70 K/uL Final    RBC 03/09/2023 4.09 (L)  4.60 - 6.20 M/uL Final    Hemoglobin 03/09/2023 14.7  14.0 - 18.0 g/dL Final    Hematocrit 03/09/2023 43.8  40.0 - 54.0 % Final    MCV 03/09/2023 107 (H)  82 - 98 fL Final    MCH 03/09/2023 35.9 (H)  27.0 - 31.0 pg Final    MCHC 03/09/2023 33.6  32.0 - 36.0 g/dL Final    RDW 03/09/2023 16.2 (H)  11.5 - 14.5 % Final    Platelets 03/09/2023 164  150 - 450 K/uL Final    MPV 03/09/2023 11.7  9.2 - 12.9 fL Final    Immature Granulocytes 03/09/2023 0.3  0.0 - 0.5 % Final    Gran # (ANC) 03/09/2023 2.9  1.8 - 7.7 K/uL Final    Immature Grans (Abs) 03/09/2023 0.02  0.00 - 0.04 K/uL Final    Comment: Mild elevation in immature granulocytes is non specific and   can be seen in a variety of conditions including stress response,   acute inflammation, trauma and pregnancy. Correlation with other   laboratory and clinical findings is essential.      Lymph # 03/09/2023 2.5  1.0 - 4.8 K/uL Final    Mono # 03/09/2023 0.5  0.3 - 1.0 K/uL Final    Eos # 03/09/2023 0.1  0.0 - 0.5 K/uL Final    Baso # 03/09/2023 0.08  0.00 - 0.20 K/uL Final    nRBC 03/09/2023 0  0 /100 WBC Final    Gran % 03/09/2023 47.8  38.0 - 73.0 % Final    Lymph % 03/09/2023 41.7  18.0 - 48.0 % Final    Mono % 03/09/2023 7.4  4.0 - 15.0 % Final    Eosinophil % 03/09/2023 1.5  0.0 - 8.0 % Final    Basophil % 03/09/2023 1.3  0.0 - 1.9 % Final    Differential Method 03/09/2023 Automated   Final    Prothrombin Time 03/09/2023 11.7  9.0 - 12.5 sec Final    INR 03/09/2023 1.1  0.8 - 1.2 Final    Comment: Coumadin Therapy:  2.0 - 3.0 for INR for all  indicators except mechanical heart valves  and antiphospholipid syndromes which should use 2.5 - 3.5.  LOT^040^PT Inn^[id number]      aPTT 03/09/2023 30.6  21.0 - 32.0 sec Final    Comment: aPTT therapeutic range = 39-69 seconds  LOT^050^APTT FSL^[id number]         EKG  EKG 05/2023 normal sinus rhythm biatrial enlargement LVH with repolarization abnormality nonspecific ST-T changes borderline QTC inferior Q-waves    Echo   Results for orders placed or performed during the hospital encounter of 05/11/23   Echo   Result Value Ref Range    BSA 1.85 m2    TDI SEPTAL 0.06 m/s    LV LATERAL E/E' RATIO 14.00 m/s    LV SEPTAL E/E' RATIO 21.00 m/s    Left Ventricular Outflow Tract Mean Velocity 0.81 cm/s    Left Ventricular Outflow Tract Mean Gradient 3.00 mmHg    AORTIC VALVE CUSP SEPERATION 0.81 cm    TDI LATERAL 0.09 m/s    PV PEAK VELOCITY 0.75 cm/s    LVIDd 5.20 3.5 - 6.0 cm    IVS 1.35 (A) 0.6 - 1.1 cm    Posterior Wall 1.47 (A) 0.6 - 1.1 cm    Ao root annulus 3.26 cm    LVIDs 3.91 2.1 - 4.0 cm    FS 25 28 - 44 %    LV mass 312.80 g    RVDD 1.56 cm    Left Ventricle Relative Wall Thickness 0.57 cm    AV regurgitation pressure 1/2 time [id number] ms    AV mean gradient 35 mmHg    AV valve area 0.48 cm2    AV Velocity Ratio 0.29     AV index (prosthetic) 0.28     MV mean gradient 6 mmHg    MV valve area p 1/2 method 4.23 cm2    MV valve area by continuity eq 0.87 cm2    E/A ratio 1.40     Mean e' 0.08 m/s    E wave deceleration time 179.20 msec    LVOT diameter 1.48 cm    LVOT area 1.7 cm2    LVOT peak douglas 1.16 m/s    LVOT peak VTI 22.40 cm    Ao peak douglas 4.0 m/s    Ao VTI 81.00 cm    Mr max douglas 3.36 m/s    LVOT stroke volume 38.52 cm3    AV peak gradient 64 mmHg    MV peak gradient 14 mmHg    E/E' ratio 16.80 m/s    MV Peak E Douglas 1.26 m/s    AR Max Douglas 4.51 m/s    MV VTI 44.1 cm    MV stenosis pressure 1/2 time 51.97 ms    MV Peak A Douglas 0.90 m/s    LV Systolic Volume 66.26 mL    LV Systolic Volume Index 34.9 mL/m2     LV Diastolic Volume 137.28 mL    LV Diastolic Volume Index 72.25 mL/m2    LV Mass Index 165 g/m2    RA Major Axis 4.27 cm    Left Atrium Minor Axis 4.89 cm    Left Atrium Major Axis 5.68 cm    LA Volume Index (Mod) 65.8 mL/m2    LA volume (mod) 125.00 cm3    RA Width 3.70 cm    EF 55 %    Narrative    · The left ventricle is mildly enlarged by left ventricular end-diastolic   volume.  · The estimated ejection fraction is 50-55%.  · Indeterminate left ventricular diastolic function.  · Severe left atrial enlargement.  · Severe mitral regurgitation.  · The mean diastolic gradient across the mitral valve is elevated but   direct planimetry of the mitral valve is normal.  · Moderate-to-severe aortic regurgitation.  · There is moderate-to-severe aortic valve stenosis.  · Aortic valve area is 0.48 cm2; peak velocity is 4.0 m/s; mean gradient   is 35 mmHg.  · Normal right ventricular size with normal right ventricular systolic   function.  · Indeterminate central venous pressure.          Imaging  X-Ray Chest 1 View    Result Date: 5/13/2023  EXAMINATION: XR CHEST 1 VIEW CLINICAL HISTORY: Pulmonary infiltrates on prior x-ray; Nonrheumatic mitral (valve) insufficiency TECHNIQUE: Single frontal view of the chest was performed. COMPARISON: Chest radiograph from 05/11/2023. FINDINGS: Mediastinal structures are midline.  Stable cardiomediastinal silhouette. No significant change in cardiopulmonary status as compared to previous radiograph.  Persistent bilateral ground-glass and interstitial opacities.  No new large consolidation.  No pneumothorax.  No significant pleural fluid.     As above. Electronically signed by: Francisco Javier Steel Date:    05/13/2023 Time:    09:01    X-Ray Shoulder Complete 2 View Right    Result Date: 5/11/2023  EXAMINATION: XR SHOULDER COMPLETE 2 OR MORE VIEWS RIGHT CLINICAL HISTORY: Unspecified fall, initial encounter TECHNIQUE: Two or three views of the right shoulder were performed. COMPARISON: None  FINDINGS: There is no evidence of acute fracture or dislocation involving the right shoulder.  There are degenerative changes involving the acromioclavicular joint with osteophyte formation and sclerotic changes noted.  Visualized right ribs appear intact.  There are reticulonodular right lung opacities visualized and imaged portions of the right lung raising question of pneumonitis or pulmonary edema.  Chronic changes are considered.     No appreciable acute fracture or dislocation involving the right shoulder. Degenerative changes involving the AC joint. Interstitial opacities in the imaged right lungs.  Correlate for the possibility of pneumonitis or pulmonary edema.  Consider chest x-ray correlation. Electronically signed by: Brielle Bhakta Date:    05/11/2023 Time:    20:34    CT Head Without Contrast    Result Date: 5/11/2023  EXAMINATION: CT HEAD WITHOUT CONTRAST CLINICAL HISTORY: Head trauma, minor, normal mental status (Age 19-64y); TECHNIQUE: Low dose axial CT images obtained throughout the head without intravenous contrast. Sagittal and coronal reconstructions were performed. COMPARISON: CT brain, 2/13 2020 FINDINGS: Intracranial compartment: Ventricles and sulci are normal in size for age without evidence of hydrocephalus. No extra-axial blood or fluid collections. The brain parenchyma appears normal. No parenchymal mass, hemorrhage, edema or major vascular distribution infarct. Skull/extracranial contents (limited evaluation): No fracture. Mastoid air cells and paranasal sinuses are essentially clear.     No acute abnormality. Electronically signed by: Rene Bhakta Date:    05/11/2023 Time:    20:10    X-Ray Chest AP Portable    Result Date: 5/11/2023  EXAMINATION: XR CHEST AP PORTABLE CLINICAL HISTORY: syncope; TECHNIQUE: Single frontal view of the chest was performed. COMPARISON: 09/15/2015 FINDINGS: Cardiac silhouette appears stable with the trachea midline.  There are new bilateral ground-glass  interstitial infiltrates with no significant vascular engorgement or consolidation or effusion.     New bilateral infiltrates.  Correlate for CHF.  Atypical viral or fungal pneumonia could have a similar appearance. Electronically signed by: Rene Bhakta Date:    05/11/2023 Time:    21:28    Echo    Result Date: 5/12/2023  · The left ventricle is mildly enlarged by left ventricular end-diastolic volume. · The estimated ejection fraction is 50-55%. · Indeterminate left ventricular diastolic function. · Severe left atrial enlargement. · Severe mitral regurgitation. · The mean diastolic gradient across the mitral valve is elevated but direct planimetry of the mitral valve is normal. · Moderate-to-severe aortic regurgitation. · There is moderate-to-severe aortic valve stenosis. · Aortic valve area is 0.48 cm2; peak velocity is 4.0 m/s; mean gradient is 35 mmHg. · Normal right ventricular size with normal right ventricular systolic function. · Indeterminate central venous pressure.      Cardiac catheterization    Result Date: 5/12/2023    The culprit may be a 70-80% lesion at the ostial inferior branch of a bifid OM2 (the OM1 is diminutive).   50-60% ostial to proximal OM2 lesion (before the OM2 branch point and potential culprit lesion described above).   50-60% mid LAD lesion.   Late proximal to early mid RCA  with collaterals from the left.   An FFR wire was not available to assess the hemodynamic significance of the LAD.   No intervention was undertaken due to the severe nature of the patient's valvular heart disease and the likely need for surgical intervention relatively soon. The procedure log was documented by Documenter: RT Ayana and verified by Ryan Huertas MD. Date: 5/12/2023  Time: 4:45 PM       Prior coronary angiogram / intervention:  See HPI    Assessment and Plan  1. Aortic valve stenosis, etiology of cardiac valve disease unspecified  Moderate severe referral to CT surgery    2. Aortic  valve insufficiency, etiology of cardiac valve disease unspecified  Moderate severe referral to CT surgery    3. Subclavian artery stenosis  Was not hemodynamically significant on last study though repeat imaging may be beneficial prior to surgery assuming this is indicated    4. Coronary artery disease involving native coronary artery of native heart without angina pectoris  Medical management avoiding Plavix given the potential need for surgery soon    5. Essential hypertension  Losartan    6. Renal artery stenosis  Questionable diagnosis    7. Severe mitral regurgitation  Refer to surgery  Currently euvolemic  Symptomatic  - Ambulatory referral/consult to Cardiothoracic Surgery; Future    Follow Up  Follow up in about 1 month (around 7/5/2023).      Ryan Huertas MD, FACC, RPVI  Interventional Cardiology

## 2023-06-09 ENCOUNTER — TELEPHONE (OUTPATIENT)
Dept: CARDIOTHORACIC SURGERY | Facility: CLINIC | Age: 58
End: 2023-06-09
Payer: MEDICAID

## 2023-06-09 NOTE — TELEPHONE ENCOUNTER
Called and confirmed pt's appt for 6/12 with pt, including appt time and locations which he verbalized understanding to.

## 2023-06-12 ENCOUNTER — OFFICE VISIT (OUTPATIENT)
Dept: CARDIOTHORACIC SURGERY | Facility: CLINIC | Age: 58
End: 2023-06-12
Payer: MEDICAID

## 2023-06-12 VITALS
RESPIRATION RATE: 18 BRPM | HEART RATE: 61 BPM | HEIGHT: 75 IN | SYSTOLIC BLOOD PRESSURE: 146 MMHG | WEIGHT: 144.19 LBS | OXYGEN SATURATION: 99 % | DIASTOLIC BLOOD PRESSURE: 66 MMHG | BODY MASS INDEX: 17.93 KG/M2

## 2023-06-12 DIAGNOSIS — I25.10 CORONARY ARTERY DISEASE INVOLVING NATIVE CORONARY ARTERY OF NATIVE HEART WITHOUT ANGINA PECTORIS: ICD-10-CM

## 2023-06-12 DIAGNOSIS — Z72.0 TOBACCO ABUSE: Primary | Chronic | ICD-10-CM

## 2023-06-12 DIAGNOSIS — Z76.89 ENCOUNTER TO ESTABLISH CARE: ICD-10-CM

## 2023-06-12 DIAGNOSIS — I35.0 AORTIC VALVE STENOSIS, ETIOLOGY OF CARDIAC VALVE DISEASE UNSPECIFIED: Primary | ICD-10-CM

## 2023-06-12 DIAGNOSIS — I34.0 SEVERE MITRAL REGURGITATION: ICD-10-CM

## 2023-06-12 DIAGNOSIS — I35.1 AORTIC VALVE INSUFFICIENCY, ETIOLOGY OF CARDIAC VALVE DISEASE UNSPECIFIED: ICD-10-CM

## 2023-06-12 PROCEDURE — 3008F BODY MASS INDEX DOCD: CPT | Mod: CPTII,,, | Performed by: THORACIC SURGERY (CARDIOTHORACIC VASCULAR SURGERY)

## 2023-06-12 PROCEDURE — 1111F PR DISCHARGE MEDS RECONCILED W/ CURRENT OUTPATIENT MED LIST: ICD-10-PCS | Mod: CPTII,,, | Performed by: THORACIC SURGERY (CARDIOTHORACIC VASCULAR SURGERY)

## 2023-06-12 PROCEDURE — 4010F ACE/ARB THERAPY RXD/TAKEN: CPT | Mod: CPTII,,, | Performed by: THORACIC SURGERY (CARDIOTHORACIC VASCULAR SURGERY)

## 2023-06-12 PROCEDURE — 99205 OFFICE O/P NEW HI 60 MIN: CPT | Mod: S$PBB,,, | Performed by: THORACIC SURGERY (CARDIOTHORACIC VASCULAR SURGERY)

## 2023-06-12 PROCEDURE — 3078F DIAST BP <80 MM HG: CPT | Mod: CPTII,,, | Performed by: THORACIC SURGERY (CARDIOTHORACIC VASCULAR SURGERY)

## 2023-06-12 PROCEDURE — 99205 PR OFFICE/OUTPT VISIT, NEW, LEVL V, 60-74 MIN: ICD-10-PCS | Mod: S$PBB,,, | Performed by: THORACIC SURGERY (CARDIOTHORACIC VASCULAR SURGERY)

## 2023-06-12 PROCEDURE — 3077F SYST BP >= 140 MM HG: CPT | Mod: CPTII,,, | Performed by: THORACIC SURGERY (CARDIOTHORACIC VASCULAR SURGERY)

## 2023-06-12 PROCEDURE — 3077F PR MOST RECENT SYSTOLIC BLOOD PRESSURE >= 140 MM HG: ICD-10-PCS | Mod: CPTII,,, | Performed by: THORACIC SURGERY (CARDIOTHORACIC VASCULAR SURGERY)

## 2023-06-12 PROCEDURE — 3008F PR BODY MASS INDEX (BMI) DOCUMENTED: ICD-10-PCS | Mod: CPTII,,, | Performed by: THORACIC SURGERY (CARDIOTHORACIC VASCULAR SURGERY)

## 2023-06-12 PROCEDURE — 4010F PR ACE/ARB THEARPY RXD/TAKEN: ICD-10-PCS | Mod: CPTII,,, | Performed by: THORACIC SURGERY (CARDIOTHORACIC VASCULAR SURGERY)

## 2023-06-12 PROCEDURE — 3078F PR MOST RECENT DIASTOLIC BLOOD PRESSURE < 80 MM HG: ICD-10-PCS | Mod: CPTII,,, | Performed by: THORACIC SURGERY (CARDIOTHORACIC VASCULAR SURGERY)

## 2023-06-12 PROCEDURE — 1159F PR MEDICATION LIST DOCUMENTED IN MEDICAL RECORD: ICD-10-PCS | Mod: CPTII,,, | Performed by: THORACIC SURGERY (CARDIOTHORACIC VASCULAR SURGERY)

## 2023-06-12 PROCEDURE — 1111F DSCHRG MED/CURRENT MED MERGE: CPT | Mod: CPTII,,, | Performed by: THORACIC SURGERY (CARDIOTHORACIC VASCULAR SURGERY)

## 2023-06-12 PROCEDURE — 99999 PR PBB SHADOW E&M-EST. PATIENT-LVL III: ICD-10-PCS | Mod: PBBFAC,,, | Performed by: THORACIC SURGERY (CARDIOTHORACIC VASCULAR SURGERY)

## 2023-06-12 PROCEDURE — 1160F PR REVIEW ALL MEDS BY PRESCRIBER/CLIN PHARMACIST DOCUMENTED: ICD-10-PCS | Mod: CPTII,,, | Performed by: THORACIC SURGERY (CARDIOTHORACIC VASCULAR SURGERY)

## 2023-06-12 PROCEDURE — 1159F MED LIST DOCD IN RCRD: CPT | Mod: CPTII,,, | Performed by: THORACIC SURGERY (CARDIOTHORACIC VASCULAR SURGERY)

## 2023-06-12 PROCEDURE — 99213 OFFICE O/P EST LOW 20 MIN: CPT | Mod: PBBFAC | Performed by: THORACIC SURGERY (CARDIOTHORACIC VASCULAR SURGERY)

## 2023-06-12 PROCEDURE — 1160F RVW MEDS BY RX/DR IN RCRD: CPT | Mod: CPTII,,, | Performed by: THORACIC SURGERY (CARDIOTHORACIC VASCULAR SURGERY)

## 2023-06-12 PROCEDURE — 99999 PR PBB SHADOW E&M-EST. PATIENT-LVL III: CPT | Mod: PBBFAC,,, | Performed by: THORACIC SURGERY (CARDIOTHORACIC VASCULAR SURGERY)

## 2023-06-12 NOTE — PROGRESS NOTES
Subjective:      Patient ID: Paresh Vance Jr. is a 57 y.o. male.    Chief Complaint: No chief complaint on file.      HPI:  Paresh Vance Jr. is a 57 y.o. male who presents as an initial consult for mitral regurgitation, aortic stenosis with regurgitation, and multivessel coronary artery disease.  He has a medical history significant for hyperlipidemia, hypertension, and basal cell carcinoma.  He is a current everyday smoker, he has smoked for more than 30 years and reports that he smoked anywhere from 2-4 PPD. He reports exertional chest pain and dyspnea on exertion.  His ECHO reveals severe mitral valve regurgitation with possible anterior leaflet flail, RISA 0.48, Aortic mean gradient 35 mmHg. He underwent a LHC which revealed multivessel coronary artery disease.      Family and social history reviewed    Review of patient's allergies indicates:  No Known Allergies  Past Medical History:   Diagnosis Date    Basal cell carcinoma (BCC) of upper lip 6/20/2021    Coronary artery disease involving native coronary artery without angina pectoris 11/12/2015    Hyperlipidemia 11/12/2015    Hypertension      Past Surgical History:   Procedure Laterality Date    CORONARY ANGIOGRAPHY  2015    CORONARY ANGIOGRAPHY Right 5/12/2023    Procedure: ANGIOGRAM, CORONARY ARTERY;  Surgeon: Ryan Huertas MD;  Location: [facility] CATH LAB;  Service: Cardiology;  Laterality: Right;    EXCISION OF LESION OF LIP N/A 07/23/2021    Procedure: EXCISION, LESION, LIP;  Surgeon: Cezar Ellis MD;  [facility];  Service: ENT;  Laterality: N/A;    FLAP PROCEDURE N/A 08/12/2021    Procedure: CREATION, FREE FLAP;  Surgeon: Cezar Ellis MD;  [facility];  Service: ENT;  Laterality: N/A;  Radial forearm    FOOT SURGERY      LIP RECONSTRUCTION Bilateral 08/24/2021    Procedure: RECONSTRUCTION, LIP;  Surgeon: Cezar Ellis MD;  [facility]: [facility];  Service: ENT;  Laterality: Bilateral;    LIP  RECONSTRUCTION N/A 10/08/2021    Procedure: RECONSTRUCTION, LIP;  Surgeon: Cezar Ellis MD;  Location: [facility] OR [facility] FLR;  Service: ENT;  Laterality: N/A;    NECK EXPLORATION Left 08/12/2021    Procedure: EXPLORATION, NECK;  Surgeon: Cezar Ellis MD;  Location: [facility] OR 2ND FLR;  Service: ENT;  Laterality: Left;    ROTATION FLAP SURGERY  07/23/2021    Procedure: CREATION, FLAP, ROTATION;  Surgeon: Cezar Ellis MD;  Location: [facility] OR [facility] FLR;  Service: ENT;;    SKIN SPLIT GRAFT Left 08/12/2021    Procedure: APPLICATION, GRAFT, SKIN, SPLIT-THICKNESS;  Surgeon: Cezar Ellis MD;  [facility];  Service: ENT;  Laterality: Left;     Family History       Problem Relation (Age of Onset)    Heart disease Father    No Known Problems Mother, Sister, Maternal Grandmother, Maternal Grandfather, Paternal Grandmother, Paternal Grandfather, Brother, Maternal Aunt, Maternal Uncle, Paternal Aunt, Paternal Uncle          Social History     Socioeconomic History    Marital status:    Occupational History    Occupation: "[employer]   Tobacco Use    Smoking status: Every Day     Packs/day: 0.50     Types: Cigarettes    Smokeless tobacco: Never   Substance and Sexual Activity    Alcohol use: Not Currently    Drug use: Yes     Types: Marijuana     Social Determinants of Health     Financial Resource Strain: Medium Risk    Difficulty of Paying Living Expenses: Somewhat hard   Food Insecurity: Food Insecurity Present    Worried About Running Out of Food in the Last Year: Sometimes true    Ran Out of Food in the Last Year: Never true   Transportation Needs: No Transportation Needs    Lack of Transportation (Medical): No    Lack of Transportation (Non-Medical): No   Stress: Stress Concern Present    Feeling of Stress : To some extent   Social Connections: Socially Isolated    Frequency of Communication with Friends and Family: More than three times a week    Frequency of Social  Gatherings with Friends and Family: More than three times a week    Attends Buddhist Services: Never    Active Member of Clubs or Organizations: No    Attends Club or Organization Meetings: Never    Marital Status:    Housing Stability: Low Risk     Unable to Pay for Housing in the Last Year: No    Number of Places Lived in the Last Year: 1    Unstable Housing in the Last Year: No       Current Outpatient Medications:     aspirin 81 MG Chew, Take 1 tablet (81 mg total) by mouth once daily., Disp: 90 tablet, Rfl: 3    atorvastatin (LIPITOR) 80 MG tablet, Take 1 tablet (80 mg total) by mouth once daily., Disp: 90 tablet, Rfl: 3    EScitalopram oxalate (LEXAPRO) 10 MG tablet, Take 1 tablet (10 mg total) by mouth every evening., Disp: 90 tablet, Rfl: 0    furosemide (LASIX) 40 MG tablet, Take 1 tablet (40 mg total) by mouth once daily., Disp: 30 tablet, Rfl: 0    losartan (COZAAR) 25 MG tablet, Take 1 tablet (25 mg total) by mouth once daily., Disp: 90 tablet, Rfl: 3    hydrocortisone 2.5 % cream, Apply topically 2 (two) times daily. (Patient not taking: Reported on 6/12/2023), Disp: 60 g, Rfl: 0    hydrOXYzine pamoate (VISTARIL) 25 MG Cap, Take 1 capsule (25 mg total) by mouth 4 (four) times daily. (Patient not taking: Reported on 6/12/2023), Disp: 40 capsule, Rfl: 0    nicotine (NICODERM CQ) 21 mg/24 hr, Place 1 patch onto the skin once daily. (Patient not taking: Reported on 6/12/2023), Disp: 28 patch, Rfl: 0  Current medications Reviewed    Review of Systems   Constitutional:  Positive for activity change and fatigue. Negative for appetite change and fever.   HENT:  Negative for nosebleeds.    Respiratory:  Positive for shortness of breath. Negative for cough.    Cardiovascular:  Positive for chest pain. Negative for palpitations and leg swelling.   Gastrointestinal:  Negative for abdominal distention, abdominal pain and nausea.   Genitourinary:  Negative for frequency.   Musculoskeletal:  Negative for  arthralgias and myalgias.   Skin:  Negative for rash.   Neurological:  Negative for dizziness and numbness.   Hematological:  Does not bruise/bleed easily.   Objective:   Physical Exam  HENT:      Head: Normocephalic and atraumatic.   Eyes:      Extraocular Movements: Extraocular movements intact.   Cardiovascular:      Rate and Rhythm: Normal rate and regular rhythm.   Pulmonary:      Effort: Pulmonary effort is normal.   Abdominal:      General: Abdomen is flat.      Palpations: Abdomen is soft.   Musculoskeletal:         General: Normal range of motion.      Cervical back: Normal range of motion.   Skin:     General: Skin is warm and dry.      Capillary Refill: Capillary refill takes less than 2 seconds.   Neurological:      General: No focal deficit present.      Mental Status: He is alert.       Diagnostic Results: Reviewed   LHC:    The culprit may be a 70-80% lesion at the ostial inferior branch of a bifid OM2 (the OM1 is diminutive).    50-60% ostial to proximal OM2 lesion (before the OM2 branch point and potential culprit lesion described above).    50-60% mid LAD lesion.    Late proximal to early mid RCA  with collaterals from the left.    An FFR wire was not available to assess the hemodynamic significance of the LAD.    No intervention was undertaken due to the severe nature of the patient's valvular heart disease and the likely need for surgical intervention relatively soon.    ECHO:  The left ventricle is mildly enlarged by left ventricular end-diastolic volume.  The estimated ejection fraction is 50-55%.  Indeterminate left ventricular diastolic function.  Severe left atrial enlargement.  Severe mitral regurgitation.  The mean diastolic gradient across the mitral valve is elevated but direct planimetry of the mitral valve is normal.  Moderate-to-severe aortic regurgitation.  There is moderate-to-severe aortic valve stenosis.  Aortic valve area is 0.48 cm2; peak velocity is 4.0 m/s; mean gradient is  35 mmHg.  Normal right ventricular size with normal right ventricular systolic function.  Indeterminate central venous pressure.  Assessment:   Mitral Valve Regurgitation   Aortic Valve Regurgitation/Stenosis   Multivessel Coronary Artery Disease  Plan:   Patient seen and pertinent studies were reviewed.  Patient is extremely short of breath and very frail and debilitated.  Patient has severe mitral regurgitation along with moderate-to-severe aortic regurgitation/stenosis.  Patient also has coronary artery disease.  This patient would be best suited to be evaluated for nonsurgical therapies.  I have discussed this case with Dr. Garcia and would see this patient as an outpatient for evaluation for PCI followed by staged TAVR for his aortic stenosis.  Once that has all resolved then patient may be considered for MitraClip down the road.  However, patient was instructed to quit smoking and start eating healthy.  Patient was accompanied by his daughter who stated in agreement and would help him out in getting proper nutrition.  All questions and concerns were addressed to patient's satisfaction.

## 2023-06-23 ENCOUNTER — NURSE TRIAGE (OUTPATIENT)
Dept: ADMINISTRATIVE | Facility: CLINIC | Age: 58
End: 2023-06-23
Payer: MEDICAID

## 2023-06-23 NOTE — TELEPHONE ENCOUNTER
Pt's son calling on behalf of patient who is present. Reports +LOC w/ fall. Pt does not remember falling and son reports he is confused currently. Advised, per protocol and verbalizes understanding.     Reason for Disposition   Difficult to awaken or acting confused (e.g., disoriented, slurred speech)    Protocols used: [id number]

## 2023-06-26 ENCOUNTER — TELEPHONE (OUTPATIENT)
Dept: CARDIOLOGY | Facility: CLINIC | Age: 58
End: 2023-06-26
Payer: MEDICAID

## 2023-06-26 NOTE — TELEPHONE ENCOUNTER
Spoke with patient, EMS came out to house and assessed him.  They wanted to take him to the hospital but patient refused.  He said his grand daughter found him on the floor unconscious.  He does not remember what happened.  I scheduled a follow up appointment with provider.

## 2023-06-26 NOTE — TELEPHONE ENCOUNTER
LVM for patient, I'm following up with you regarding the call to the On Call Nurse over the weekend.  My callback is [phone].

## 2023-06-29 ENCOUNTER — OFFICE VISIT (OUTPATIENT)
Dept: CARDIOLOGY | Facility: CLINIC | Age: 58
End: 2023-06-29
Payer: MEDICAID

## 2023-06-29 VITALS
SYSTOLIC BLOOD PRESSURE: 162 MMHG | BODY MASS INDEX: 17.64 KG/M2 | DIASTOLIC BLOOD PRESSURE: 78 MMHG | WEIGHT: 141.88 LBS | HEART RATE: 92 BPM | OXYGEN SATURATION: 98 % | HEIGHT: 75 IN

## 2023-06-29 DIAGNOSIS — I25.10 CORONARY ARTERY DISEASE INVOLVING NATIVE CORONARY ARTERY OF NATIVE HEART WITHOUT ANGINA PECTORIS: ICD-10-CM

## 2023-06-29 DIAGNOSIS — I35.0 AORTIC VALVE STENOSIS, ETIOLOGY OF CARDIAC VALVE DISEASE UNSPECIFIED: ICD-10-CM

## 2023-06-29 DIAGNOSIS — I77.1 SUBCLAVIAN ARTERY STENOSIS: ICD-10-CM

## 2023-06-29 DIAGNOSIS — I35.1 AORTIC VALVE INSUFFICIENCY, ETIOLOGY OF CARDIAC VALVE DISEASE UNSPECIFIED: ICD-10-CM

## 2023-06-29 DIAGNOSIS — R94.39 ABNORMAL CARDIOVASCULAR STRESS TEST: Primary | ICD-10-CM

## 2023-06-29 DIAGNOSIS — I70.1 RENAL ARTERY STENOSIS: ICD-10-CM

## 2023-06-29 DIAGNOSIS — I34.0 SEVERE MITRAL REGURGITATION: ICD-10-CM

## 2023-06-29 DIAGNOSIS — R55 SYNCOPE AND COLLAPSE: ICD-10-CM

## 2023-06-29 DIAGNOSIS — I10 ESSENTIAL HYPERTENSION: Chronic | ICD-10-CM

## 2023-06-29 PROCEDURE — 99999 PR PBB SHADOW E&M-EST. PATIENT-LVL III: CPT | Mod: PBBFAC,,, | Performed by: INTERNAL MEDICINE

## 2023-06-29 PROCEDURE — 99999 PR PBB SHADOW E&M-EST. PATIENT-LVL III: ICD-10-PCS | Mod: PBBFAC,,, | Performed by: INTERNAL MEDICINE

## 2023-06-29 PROCEDURE — 99213 OFFICE O/P EST LOW 20 MIN: CPT | Mod: PBBFAC,PN | Performed by: INTERNAL MEDICINE

## 2023-06-29 PROCEDURE — 99215 OFFICE O/P EST HI 40 MIN: CPT | Mod: S$PBB,,, | Performed by: INTERNAL MEDICINE

## 2023-06-29 PROCEDURE — 1160F PR REVIEW ALL MEDS BY PRESCRIBER/CLIN PHARMACIST DOCUMENTED: ICD-10-PCS | Mod: CPTII,,, | Performed by: INTERNAL MEDICINE

## 2023-06-29 PROCEDURE — 3078F DIAST BP <80 MM HG: CPT | Mod: CPTII,,, | Performed by: INTERNAL MEDICINE

## 2023-06-29 PROCEDURE — 1159F MED LIST DOCD IN RCRD: CPT | Mod: CPTII,,, | Performed by: INTERNAL MEDICINE

## 2023-06-29 PROCEDURE — 3077F SYST BP >= 140 MM HG: CPT | Mod: CPTII,,, | Performed by: INTERNAL MEDICINE

## 2023-06-29 PROCEDURE — 3077F PR MOST RECENT SYSTOLIC BLOOD PRESSURE >= 140 MM HG: ICD-10-PCS | Mod: CPTII,,, | Performed by: INTERNAL MEDICINE

## 2023-06-29 PROCEDURE — 3078F PR MOST RECENT DIASTOLIC BLOOD PRESSURE < 80 MM HG: ICD-10-PCS | Mod: CPTII,,, | Performed by: INTERNAL MEDICINE

## 2023-06-29 PROCEDURE — 3008F BODY MASS INDEX DOCD: CPT | Mod: CPTII,,, | Performed by: INTERNAL MEDICINE

## 2023-06-29 PROCEDURE — 4010F ACE/ARB THERAPY RXD/TAKEN: CPT | Mod: CPTII,,, | Performed by: INTERNAL MEDICINE

## 2023-06-29 PROCEDURE — 1160F RVW MEDS BY RX/DR IN RCRD: CPT | Mod: CPTII,,, | Performed by: INTERNAL MEDICINE

## 2023-06-29 PROCEDURE — 3008F PR BODY MASS INDEX (BMI) DOCUMENTED: ICD-10-PCS | Mod: CPTII,,, | Performed by: INTERNAL MEDICINE

## 2023-06-29 PROCEDURE — 99215 PR OFFICE/OUTPT VISIT, EST, LEVL V, 40-54 MIN: ICD-10-PCS | Mod: S$PBB,,, | Performed by: INTERNAL MEDICINE

## 2023-06-29 PROCEDURE — 4010F PR ACE/ARB THEARPY RXD/TAKEN: ICD-10-PCS | Mod: CPTII,,, | Performed by: INTERNAL MEDICINE

## 2023-06-29 PROCEDURE — 1159F PR MEDICATION LIST DOCUMENTED IN MEDICAL RECORD: ICD-10-PCS | Mod: CPTII,,, | Performed by: INTERNAL MEDICINE

## 2023-06-29 NOTE — PROGRESS NOTES
[facility] [address]  Cardiology Clinic Note      Chief Complaint  Chief Complaint   Patient presents with    Follow-up     Found unconscious at home on 06/26/203, EMS arrived, patient refused to go to hospital       HPI:    57-year-old male with past medical history tobacco use, pulmonary nodule CT 2020, hypertension, hyperlipidemia, coronary artery disease angiogram 2015 mid LAD 60% proximal circumflex 50% OM 2 60% proximal right coronary artery 70%  mid PLB 70%, renal artery stenosis? Normal by angiogram 2015 subsequent coronary angiogram 05/2023 for NSTEMI possible culprit ostial inferior branch bifid OM2 OM1 diminutive KURT 3 flow residual disease moderate mid LAD lesion and RCA  with robust collateralization from the left system FFR not available to assess the LAD due to supply chain issues no intervention performed due to possible surgical valvular disease, concern for subclavian steal by ultrasound then CTA 2020 50% stenosis of left subclavian artery proximal to the vertebral artery origin, echo 05/2023 EF estimated 50-55% mildly dilated LV chamber size diastolic function indeterminate severe left atrial enlargement severe mitral regurgitation moderate to severe aortic regurgitation moderate to severe aortic stenosis peak velocity 4 mean gradient 35 normal RV CVP indeterminate prior stress echo 2015 normal EF normal LV diastolic function normal RV basal inferior septum and inferior wall hypokinetic with stress    Recent hospitalization for NSTEMI no treatment due to valvular disease status possibly surgical   OM branch culprit as above with RCA  and moderate mid LAD lesion unable to perform FFR due to the fact that there was no wire available because of supply chain issues  Of note, history of left subclavian stenosis    The patient's symptoms include dyspnea on exertion with 1 block  He is relatively sedentary    Sent to Cardiothoracic surgery seen and referred to Interventional  Cardiology/structural    Evidently the patient experienced a syncopal episode last Thursday as high as well he was in bed and attempted to get up out of bed then he woke up on the floor.  The patient's daughter said that it was probably about 30 minutes that he was on the floor.  The patient does not recall a prodrome.  He has had syncope was referred times in the past.    The patient feels well today    Medications  Current Outpatient Medications   Medication Sig Dispense Refill    aspirin 81 MG Chew Take 1 tablet (81 mg total) by mouth once daily. 90 tablet 3    atorvastatin (LIPITOR) 80 MG tablet Take 1 tablet (80 mg total) by mouth once daily. 90 tablet 3    EScitalopram oxalate (LEXAPRO) 10 MG tablet Take 1 tablet (10 mg total) by mouth every evening. 90 tablet 0    furosemide (LASIX) 40 MG tablet Take 1 tablet (40 mg total) by mouth once daily. 30 tablet 0    hydrocortisone 2.5 % cream Apply topically 2 (two) times daily. (Patient not taking: Reported on 6/12/2023) 60 g 0    hydrOXYzine pamoate (VISTARIL) 25 MG Cap Take 1 capsule (25 mg total) by mouth 4 (four) times daily. (Patient not taking: Reported on 6/12/2023) 40 capsule 0    losartan (COZAAR) 25 MG tablet Take 1 tablet (25 mg total) by mouth once daily. 90 tablet 3    nicotine (NICODERM CQ) 21 mg/24 hr Place 1 patch onto the skin once daily. (Patient not taking: Reported on 6/12/2023) 28 patch 0     No current facility-administered medications for this visit.        History  Past Medical History:   Diagnosis Date    Basal cell carcinoma (BCC) of upper lip 6/20/2021    Coronary artery disease involving native coronary artery without angina pectoris 11/12/2015    Hyperlipidemia 11/12/2015    Hypertension      Past Surgical History:   Procedure Laterality Date    CORONARY ANGIOGRAPHY  2015    CORONARY ANGIOGRAPHY Right 5/12/2023    Procedure: ANGIOGRAM, CORONARY ARTERY;  Surgeon: Ryan Huertas MD;  Location: [facility] [place] CATH LAB;  Service: Cardiology;   Laterality: Right;    EXCISION OF LESION OF LIP N/A 07/23/2021    Procedure: EXCISION, LESION, LIP;  Surgeon: Cezar Ellis MD;  Location: [facility] OR 2ND FLR;  Service: ENT;  Laterality: N/A;    FLAP PROCEDURE N/A 08/12/2021    Procedure: CREATION, FREE FLAP;  Surgeon: Cezar Ellis MD;  Location: [facility] OR 2ND FLR;  Service: ENT;  Laterality: N/A;  Radial forearm    FOOT SURGERY      LIP RECONSTRUCTION Bilateral 08/24/2021    Procedure: RECONSTRUCTION, LIP;  Surgeon: Cezar Ellis MD;  Location: [facility] OR 2ND FLR;  Service: ENT;  Laterality: Bilateral;    LIP RECONSTRUCTION N/A 10/08/2021    Procedure: RECONSTRUCTION, LIP;  Surgeon: Cezar Ellis MD;  Location: [facility] OR 2ND FLR;  Service: ENT;  Laterality: N/A;    NECK EXPLORATION Left 08/12/2021    Procedure: EXPLORATION, NECK;  Surgeon: Cezar Ellis MD;  Location: [facility] OR 2ND FLR;  Service: ENT;  Laterality: Left;    ROTATION FLAP SURGERY  07/23/2021    Procedure: CREATION, FLAP, ROTATION;  Surgeon: Cezar Ellis MD;  Location: [facility] OR 2ND FLR;  Service: ENT;;    SKIN SPLIT GRAFT Left 08/12/2021    Procedure: APPLICATION, GRAFT, SKIN, SPLIT-THICKNESS;  Surgeon: Cezar Ellis MD;  Location: [facility] OR 2ND FLR;  Service: ENT;  Laterality: Left;     Social History     Socioeconomic History    Marital status:    Occupational History    Occupation:    Tobacco Use    Smoking status: Every Day     Packs/day: 0.50     Types: Cigarettes    Smokeless tobacco: Never   Substance and Sexual Activity    Alcohol use: Not Currently    Drug use: Yes     Types: Marijuana     Social Determinants of Health     Financial Resource Strain: Medium Risk    Difficulty of Paying Living Expenses: Somewhat hard   Food Insecurity: Food Insecurity Present    Worried About Running Out of Food in the Last Year: Sometimes true    Ran Out of Food in the Last Year: Never true   Transportation Needs: No Transportation Needs    Lack of  Transportation (Medical): No    Lack of Transportation (Non-Medical): No   Stress: Stress Concern Present    Feeling of Stress : To some extent   Social Connections: Socially Isolated    Frequency of Communication with Friends and Family: More than three times a week    Frequency of Social Gatherings with Friends and Family: More than three times a week    Attends Synagogue Services: Never    Active Member of Clubs or Organizations: No    Attends Club or Organization Meetings: Never    Marital Status:    Housing Stability: Low Risk     Unable to Pay for Housing in the Last Year: No    Number of Places Lived in the Last Year: 1    Unstable Housing in the Last Year: No     Family History   Problem Relation Age of Onset    Heart disease Father     No Known Problems Mother     No Known Problems Sister     No Known Problems Maternal Grandmother     No Known Problems Maternal Grandfather     No Known Problems Paternal Grandmother     No Known Problems Paternal Grandfather     No Known Problems Brother     No Known Problems Maternal Aunt     No Known Problems Maternal Uncle     No Known Problems Paternal Aunt     No Known Problems Paternal Uncle     Anemia Neg Hx     Arrhythmia Neg Hx     Asthma Neg Hx     Clotting disorder Neg Hx     Fainting Neg Hx     Heart attack Neg Hx     Heart failure Neg Hx     Hyperlipidemia Neg Hx     Hypertension Neg Hx     Stroke Neg Hx     Atrial Septal Defect Neg Hx         Allergies  Review of patient's allergies indicates:  No Known Allergies    Review of Systems   Review of Systems   Constitutional: Negative for fever.   HENT:  Negative for nosebleeds.    Eyes:  Negative for visual disturbance.   Cardiovascular:  Positive for dyspnea on exertion. Negative for chest pain, claudication, palpitations and syncope.   Respiratory:  Negative for cough, hemoptysis and wheezing.    Endocrine: Negative for cold intolerance, heat intolerance, polyphagia and polyuria.   Hematologic/Lymphatic:  Negative for bleeding problem.   Skin:  Negative for rash.   Musculoskeletal:  Negative for myalgias.   Gastrointestinal:  Negative for hematemesis, hematochezia, nausea and vomiting.   Genitourinary:  Negative for dysuria.   Neurological:  Negative for focal weakness and sensory change.   Psychiatric/Behavioral:  Negative for altered mental status.      Physical Exam  Vitals:    06/29/23 1031   BP: (!) 162/78   Pulse: 92     Wt Readings from Last 1 Encounters:   06/29/23 64.4 kg (141 lb 13.9 oz)     Physical Exam  HENT:      Head: Normocephalic and atraumatic.      Mouth/Throat:      Mouth: Mucous membranes are moist.   Eyes:      Extraocular Movements: Extraocular movements intact.      Pupils: Pupils are equal, round, and reactive to light.   Neck:      Vascular: No carotid bruit or JVD.   Cardiovascular:      Rate and Rhythm: Normal rate and regular rhythm.      Pulses: Normal pulses and intact distal pulses.      Heart sounds: Murmur heard.   Systolic murmur is present with a grade of 3/6.   Diastolic murmur is present with a grade of 1/4.     No friction rub. No gallop.   Pulmonary:      Effort: Pulmonary effort is normal.      Breath sounds: Normal breath sounds.   Abdominal:      Tenderness: There is no abdominal tenderness. There is no guarding or rebound.   Musculoskeletal:      Right lower leg: No edema.      Left lower leg: No edema.   Skin:     General: Skin is warm and dry.      Capillary Refill: Capillary refill takes less than 2 seconds.   Neurological:      General: No focal deficit present.      Mental Status: He is alert and oriented to person, place, and time.   Psychiatric:         Mood and Affect: Mood normal.       Labs  No results displayed because visit has over 200 results.      Admission on 03/09/2023, Discharged on 03/09/2023   Component Date Value Ref Range Status    Sodium 03/09/2023 138  136 - 145 mmol/L Final    Potassium 03/09/2023 4.0  3.5 - 5.1 mmol/L Final    Chloride 03/09/2023  102  95 - 110 mmol/L Final    CO2 03/09/2023 26  23 - 29 mmol/L Final    Glucose 03/09/2023 94  70 - 110 mg/dL Final    BUN 03/09/2023 8  6 - 20 mg/dL Final    Creatinine 03/09/2023 1.1  0.5 - 1.4 mg/dL Final    Calcium 03/09/2023 10.1  8.7 - 10.5 mg/dL Final    Total Protein 03/09/2023 7.0  6.0 - 8.4 g/dL Final    Albumin 03/09/2023 4.3  3.5 - 5.2 g/dL Final    Total Bilirubin 03/09/2023 1.0  0.1 - 1.0 mg/dL Final    Comment: For infants and newborns, interpretation of results should be based  on gestational age, weight and in agreement with clinical  observations.    Premature Infant recommended reference ranges:  Up to 24 hours.............<8.0 mg/dL  Up to 48 hours............<12.0 mg/dL  3-5 days..................<15.0 mg/dL  6-29 days.................<15.0 mg/dL      Alkaline Phosphatase 03/09/2023 81  55 - 135 U/L Final    AST 03/09/2023 15  10 - 40 U/L Final    ALT 03/09/2023 8 (L)  10 - 44 U/L Final    Anion Gap 03/09/2023 10  8 - 16 mmol/L Final    eGFR 03/09/2023 >60.0  >60 mL/min/1.73 m^2 Final    WBC 03/09/2023 6.09  3.90 - 12.70 K/uL Final    RBC 03/09/2023 4.09 (L)  4.60 - 6.20 M/uL Final    Hemoglobin 03/09/2023 14.7  14.0 - 18.0 g/dL Final    Hematocrit 03/09/2023 43.8  40.0 - 54.0 % Final    MCV 03/09/2023 107 (H)  82 - 98 fL Final    MCH 03/09/2023 35.9 (H)  27.0 - 31.0 pg Final    MCHC 03/09/2023 33.6  32.0 - 36.0 g/dL Final    RDW 03/09/2023 16.2 (H)  11.5 - 14.5 % Final    Platelets 03/09/2023 164  150 - 450 K/uL Final    MPV 03/09/2023 11.7  9.2 - 12.9 fL Final    Immature Granulocytes 03/09/2023 0.3  0.0 - 0.5 % Final    Gran # (ANC) 03/09/2023 2.9  1.8 - 7.7 K/uL Final    Immature Grans (Abs) 03/09/2023 0.02  0.00 - 0.04 K/uL Final    Comment: Mild elevation in immature granulocytes is non specific and   can be seen in a variety of conditions including stress response,   acute inflammation, trauma and pregnancy. Correlation with other   laboratory and clinical findings is essential.       Lymph # 03/09/2023 2.5  1.0 - 4.8 K/uL Final    Mono # 03/09/2023 0.5  0.3 - 1.0 K/uL Final    Eos # 03/09/2023 0.1  0.0 - 0.5 K/uL Final    Baso # 03/09/2023 0.08  0.00 - 0.20 K/uL Final    nRBC 03/09/2023 0  0 /100 WBC Final    Gran % 03/09/2023 47.8  38.0 - 73.0 % Final    Lymph % 03/09/2023 41.7  18.0 - 48.0 % Final    Mono % 03/09/2023 7.4  4.0 - 15.0 % Final    Eosinophil % 03/09/2023 1.5  0.0 - 8.0 % Final    Basophil % 03/09/2023 1.3  0.0 - 1.9 % Final    Differential Method 03/09/2023 Automated   Final    Prothrombin Time 03/09/2023 11.7  9.0 - 12.5 sec Final    INR 03/09/2023 1.1  0.8 - 1.2 Final    Comment: Coumadin Therapy:  2.0 - 3.0 for INR for all indicators except mechanical heart valves  and antiphospholipid syndromes which should use 2.5 - 3.5.  LOT^040^PT Inn^[id number]      aPTT 03/09/2023 30.6  21.0 - 32.0 sec Final    Comment: aPTT therapeutic range = 39-69 seconds  LOT^050^APTT FSL^[id number]         EKG  EKG 05/2023 normal sinus rhythm biatrial enlargement LVH with repolarization abnormality nonspecific ST-T changes borderline QTC inferior Q-waves    Echo   Results for orders placed or performed during the hospital encounter of 05/11/23   Echo   Result Value Ref Range    BSA 1.85 m2    TDI SEPTAL 0.06 m/s    LV LATERAL E/E' RATIO 14.00 m/s    LV SEPTAL E/E' RATIO 21.00 m/s    Left Ventricular Outflow Tract Mean Velocity 0.81 cm/s    Left Ventricular Outflow Tract Mean Gradient 3.00 mmHg    AORTIC VALVE CUSP SEPERATION 0.81 cm    TDI LATERAL 0.09 m/s    PV PEAK VELOCITY 0.75 cm/s    LVIDd 5.20 3.5 - 6.0 cm    IVS 1.35 (A) 0.6 - 1.1 cm    Posterior Wall 1.47 (A) 0.6 - 1.1 cm    Ao root annulus 3.26 cm    LVIDs 3.91 2.1 - 4.0 cm    FS 25 28 - 44 %    LV mass 312.80 g    RVDD 1.56 cm    Left Ventricle Relative Wall Thickness 0.57 cm    AV regurgitation pressure 1/2 time [id number] ms    AV mean gradient 35 mmHg    AV valve area 0.48 cm2    AV Velocity Ratio 0.29     AV index (prosthetic) 0.28      MV mean gradient 6 mmHg    MV valve area p 1/2 method 4.23 cm2    MV valve area by continuity eq 0.87 cm2    E/A ratio 1.40     Mean e' 0.08 m/s    E wave deceleration time 179.20 msec    LVOT diameter 1.48 cm    LVOT area 1.7 cm2    LVOT peak douglas 1.16 m/s    LVOT peak VTI 22.40 cm    Ao peak douglas 4.0 m/s    Ao VTI 81.00 cm    Mr max douglas 3.36 m/s    LVOT stroke volume 38.52 cm3    AV peak gradient 64 mmHg    MV peak gradient 14 mmHg    E/E' ratio 16.80 m/s    MV Peak E Douglas 1.26 m/s    AR Max Douglas 4.51 m/s    MV VTI 44.1 cm    MV stenosis pressure 1/2 time 51.97 ms    MV Peak A Douglas 0.90 m/s    LV Systolic Volume 66.26 mL    LV Systolic Volume Index 34.9 mL/m2    LV Diastolic Volume 137.28 mL    LV Diastolic Volume Index 72.25 mL/m2    LV Mass Index 165 g/m2    RA Major Axis 4.27 cm    Left Atrium Minor Axis 4.89 cm    Left Atrium Major Axis 5.68 cm    LA Volume Index (Mod) 65.8 mL/m2    LA volume (mod) 125.00 cm3    RA Width 3.70 cm    EF 55 %    Narrative    · The left ventricle is mildly enlarged by left ventricular end-diastolic   volume.  · The estimated ejection fraction is 50-55%.  · Indeterminate left ventricular diastolic function.  · Severe left atrial enlargement.  · Severe mitral regurgitation.  · The mean diastolic gradient across the mitral valve is elevated but   direct planimetry of the mitral valve is normal.  · Moderate-to-severe aortic regurgitation.  · There is moderate-to-severe aortic valve stenosis.  · Aortic valve area is 0.48 cm2; peak velocity is 4.0 m/s; mean gradient   is 35 mmHg.  · Normal right ventricular size with normal right ventricular systolic   function.  · Indeterminate central venous pressure.          Imaging  X-Ray Chest 1 View    Result Date: 5/13/2023  EXAMINATION: XR CHEST 1 VIEW CLINICAL HISTORY: Pulmonary infiltrates on prior x-ray; Nonrheumatic mitral (valve) insufficiency TECHNIQUE: Single frontal view of the chest was performed. COMPARISON: Chest radiograph from  05/11/2023. FINDINGS: Mediastinal structures are midline.  Stable cardiomediastinal silhouette. No significant change in cardiopulmonary status as compared to previous radiograph.  Persistent bilateral ground-glass and interstitial opacities.  No new large consolidation.  No pneumothorax.  No significant pleural fluid.     As above. Electronically signed by: Francisco Javier Steel Date:    05/13/2023 Time:    09:01    X-Ray Shoulder Complete 2 View Right    Result Date: 5/11/2023  EXAMINATION: XR SHOULDER COMPLETE 2 OR MORE VIEWS RIGHT CLINICAL HISTORY: Unspecified fall, initial encounter TECHNIQUE: Two or three views of the right shoulder were performed. COMPARISON: None FINDINGS: There is no evidence of acute fracture or dislocation involving the right shoulder.  There are degenerative changes involving the acromioclavicular joint with osteophyte formation and sclerotic changes noted.  Visualized right ribs appear intact.  There are reticulonodular right lung opacities visualized and imaged portions of the right lung raising question of pneumonitis or pulmonary edema.  Chronic changes are considered.     No appreciable acute fracture or dislocation involving the right shoulder. Degenerative changes involving the AC joint. Interstitial opacities in the imaged right lungs.  Correlate for the possibility of pneumonitis or pulmonary edema.  Consider chest x-ray correlation. Electronically signed by: Brielle Bhakta Date:    05/11/2023 Time:    20:34    CT Head Without Contrast    Result Date: 5/11/2023  EXAMINATION: CT HEAD WITHOUT CONTRAST CLINICAL HISTORY: Head trauma, minor, normal mental status (Age 19-64y); TECHNIQUE: Low dose axial CT images obtained throughout the head without intravenous contrast. Sagittal and coronal reconstructions were performed. COMPARISON: CT brain, 2/13 2020 FINDINGS: Intracranial compartment: Ventricles and sulci are normal in size for age without evidence of hydrocephalus. No extra-axial  blood or fluid collections. The brain parenchyma appears normal. No parenchymal mass, hemorrhage, edema or major vascular distribution infarct. Skull/extracranial contents (limited evaluation): No fracture. Mastoid air cells and paranasal sinuses are essentially clear.     No acute abnormality. Electronically signed by: Rene Bhakta Date:    05/11/2023 Time:    20:10    X-Ray Chest AP Portable    Result Date: 5/11/2023  EXAMINATION: XR CHEST AP PORTABLE CLINICAL HISTORY: syncope; TECHNIQUE: Single frontal view of the chest was performed. COMPARISON: 09/15/2015 FINDINGS: Cardiac silhouette appears stable with the trachea midline.  There are new bilateral ground-glass interstitial infiltrates with no significant vascular engorgement or consolidation or effusion.     New bilateral infiltrates.  Correlate for CHF.  Atypical viral or fungal pneumonia could have a similar appearance. Electronically signed by: Rene Bhakta Date:    05/11/2023 Time:    21:28    Echo    Result Date: 5/12/2023  · The left ventricle is mildly enlarged by left ventricular end-diastolic volume. · The estimated ejection fraction is 50-55%. · Indeterminate left ventricular diastolic function. · Severe left atrial enlargement. · Severe mitral regurgitation. · The mean diastolic gradient across the mitral valve is elevated but direct planimetry of the mitral valve is normal. · Moderate-to-severe aortic regurgitation. · There is moderate-to-severe aortic valve stenosis. · Aortic valve area is 0.48 cm2; peak velocity is 4.0 m/s; mean gradient is 35 mmHg. · Normal right ventricular size with normal right ventricular systolic function. · Indeterminate central venous pressure.      Cardiac catheterization    Result Date: 5/12/2023    The culprit may be a 70-80% lesion at the ostial inferior branch of a bifid OM2 (the OM1 is diminutive).   50-60% ostial to proximal OM2 lesion (before the OM2 branch point and potential culprit lesion described  above).   50-60% mid LAD lesion.   Late proximal to early mid RCA  with collaterals from the left.   An FFR wire was not available to assess the hemodynamic significance of the LAD.   No intervention was undertaken due to the severe nature of the patient's valvular heart disease and the likely need for surgical intervention relatively soon. The procedure log was documented by Documenter: Mary Kay Kincaid RT and verified by Ryan Huertas MD. Date: 5/12/2023  Time: 4:45 PM       Prior coronary angiogram / intervention:  See HPI    Assessment and Plan  1. Aortic valve stenosis, etiology of cardiac valve disease unspecified  Seen by CT surgery referred to an interventional/structural cardiology    2. Aortic valve insufficiency, etiology of cardiac valve disease unspecified  Seen by CT surgery referred to an interventional/structural cardiology    3. Subclavian artery stenosis  Was not hemodynamically significant on last study though repeat imaging may be beneficial prior to surgery assuming this is indicated    4. Coronary artery disease involving native coronary artery of native heart without angina pectoris  Medical management avoiding Plavix given the potential need for surgery soon    5. Essential hypertension  Losartan    6. Renal artery stenosis  Questionable diagnosis    7. Severe mitral regurgitation  Pending evaluation by structural Interventional Cardiology seen by Cardiothoracic surgery  Currently euvolemic  Symptomatic    8. Syncope  Holter  May be due to valvular disease  Found down refused ER       Follow Up  1 month      Ryan Huertas MD, FACC, RPVI  Interventional Cardiology

## 2023-08-06 PROBLEM — G40.909 SEIZURE DISORDER: Status: ACTIVE | Noted: 2023-08-06

## 2023-08-07 ENCOUNTER — CLINICAL SUPPORT (OUTPATIENT)
Dept: SMOKING CESSATION | Facility: CLINIC | Age: 58
End: 2023-08-07

## 2023-08-07 ENCOUNTER — HOSPITAL ENCOUNTER (INPATIENT)
Facility: HOSPITAL | Age: 58
LOS: 3 days | Discharge: HOME | DRG: 064 | End: 2023-08-10
Attending: HOSPITALIST | Admitting: HOSPITALIST
Payer: MEDICAID

## 2023-08-07 DIAGNOSIS — F17.210 CIGARETTE SMOKER: Primary | ICD-10-CM

## 2023-08-07 DIAGNOSIS — R56.9 SEIZURE: ICD-10-CM

## 2023-08-07 DIAGNOSIS — I63.9 CVA (CEREBRAL VASCULAR ACCIDENT): ICD-10-CM

## 2023-08-07 DIAGNOSIS — I63.9 STROKE: ICD-10-CM

## 2023-08-07 DIAGNOSIS — R56.9 NEW ONSET SEIZURE: ICD-10-CM

## 2023-08-07 DIAGNOSIS — I63.311 CEREBROVASCULAR ACCIDENT (CVA) DUE TO THROMBOSIS OF RIGHT MIDDLE CEREBRAL ARTERY: ICD-10-CM

## 2023-08-07 DIAGNOSIS — I21.4 NSTEMI (NON-ST ELEVATED MYOCARDIAL INFARCTION): ICD-10-CM

## 2023-08-07 DIAGNOSIS — R47.01 APHASIA: Primary | ICD-10-CM

## 2023-08-07 PROBLEM — C44.91 BASAL CELL CARCINOMA: Status: RESOLVED | Noted: 2021-10-08 | Resolved: 2023-08-07

## 2023-08-07 PROBLEM — I38 VALVULAR HEART DISEASE: Status: ACTIVE | Noted: 2023-08-07

## 2023-08-07 PROBLEM — Z91.199 NON-COMPLIANT PATIENT: Chronic | Status: RESOLVED | Noted: 2020-02-17 | Resolved: 2023-08-07

## 2023-08-07 LAB
CHOLEST SERPL-MCNC: 92 MG/DL (ref 120–199)
CHOLEST/HDLC SERPL: 3.8 {RATIO} (ref 2–5)
ESTIMATED AVG GLUCOSE: 94 MG/DL (ref 68–131)
HBA1C MFR BLD: 4.9 % (ref 4–5.6)
HDLC SERPL-MCNC: 24 MG/DL (ref 40–75)
HDLC SERPL: 26.1 % (ref 20–50)
LACTATE SERPL-SCNC: 1.6 MMOL/L (ref 0.5–2.2)
LDLC SERPL CALC-MCNC: 56 MG/DL (ref 63–159)
NONHDLC SERPL-MCNC: 68 MG/DL
POCT GLUCOSE: 113 MG/DL (ref 70–110)
TRIGL SERPL-MCNC: 60 MG/DL (ref 30–150)
TSH SERPL DL<=0.005 MIU/L-ACNC: 1.66 UIU/ML (ref 0.4–4)

## 2023-08-07 PROCEDURE — 83036 HEMOGLOBIN GLYCOSYLATED A1C: CPT | Performed by: INTERNAL MEDICINE

## 2023-08-07 PROCEDURE — 94761 N-INVAS EAR/PLS OXIMETRY MLT: CPT

## 2023-08-07 PROCEDURE — 99900035 HC TECH TIME PER 15 MIN (STAT)

## 2023-08-07 PROCEDURE — 95819 EEG AWAKE AND ASLEEP: CPT | Mod: 26,,, | Performed by: PSYCHIATRY & NEUROLOGY

## 2023-08-07 PROCEDURE — 95819 EEG AWAKE AND ASLEEP: CPT

## 2023-08-07 PROCEDURE — 97530 THERAPEUTIC ACTIVITIES: CPT

## 2023-08-07 PROCEDURE — 92526 ORAL FUNCTION THERAPY: CPT

## 2023-08-07 PROCEDURE — 63600175 PHARM REV CODE 636 W HCPCS: Performed by: INTERNAL MEDICINE

## 2023-08-07 PROCEDURE — 25000003 PHARM REV CODE 250: Performed by: INTERNAL MEDICINE

## 2023-08-07 PROCEDURE — 99406 PT REFUSED TOBACCO CESSATION: ICD-10-PCS | Mod: S$GLB,,,

## 2023-08-07 PROCEDURE — 97161 PT EVAL LOW COMPLEX 20 MIN: CPT

## 2023-08-07 PROCEDURE — 99999 PR PBB SHADOW E&M-EST. PATIENT-LVL I: CPT | Mod: PBBFAC,,,

## 2023-08-07 PROCEDURE — 25000003 PHARM REV CODE 250: Performed by: HOSPITALIST

## 2023-08-07 PROCEDURE — 27000221 HC OXYGEN, UP TO 24 HOURS

## 2023-08-07 PROCEDURE — 99999 PR PBB SHADOW E&M-EST. PATIENT-LVL I: ICD-10-PCS | Mod: PBBFAC,,,

## 2023-08-07 PROCEDURE — 36415 COLL VENOUS BLD VENIPUNCTURE: CPT | Performed by: INTERNAL MEDICINE

## 2023-08-07 PROCEDURE — 83605 ASSAY OF LACTIC ACID: CPT | Performed by: INTERNAL MEDICINE

## 2023-08-07 PROCEDURE — 11000001 HC ACUTE MED/SURG PRIVATE ROOM

## 2023-08-07 PROCEDURE — 95819 PR EEG,W/AWAKE & ASLEEP RECORD: ICD-10-PCS | Mod: 26,,, | Performed by: PSYCHIATRY & NEUROLOGY

## 2023-08-07 PROCEDURE — 97165 OT EVAL LOW COMPLEX 30 MIN: CPT

## 2023-08-07 PROCEDURE — 84443 ASSAY THYROID STIM HORMONE: CPT | Performed by: INTERNAL MEDICINE

## 2023-08-07 PROCEDURE — 80061 LIPID PANEL: CPT | Performed by: INTERNAL MEDICINE

## 2023-08-07 PROCEDURE — 99406 BEHAV CHNG SMOKING 3-10 MIN: CPT | Mod: S$GLB,,,

## 2023-08-07 PROCEDURE — 63600175 PHARM REV CODE 636 W HCPCS: Performed by: HOSPITALIST

## 2023-08-07 PROCEDURE — 92610 EVALUATE SWALLOWING FUNCTION: CPT

## 2023-08-07 RX ORDER — LEVETIRACETAM 5 MG/ML
500 INJECTION INTRAVASCULAR EVERY 12 HOURS
Status: DISCONTINUED | OUTPATIENT
Start: 2023-08-07 | End: 2023-08-10 | Stop reason: HOSPADM

## 2023-08-07 RX ORDER — CLOPIDOGREL BISULFATE 75 MG/1
75 TABLET ORAL DAILY
Status: DISCONTINUED | OUTPATIENT
Start: 2023-08-07 | End: 2023-08-10 | Stop reason: HOSPADM

## 2023-08-07 RX ORDER — SODIUM CHLORIDE 0.9 % (FLUSH) 0.9 %
10 SYRINGE (ML) INJECTION
Status: DISCONTINUED | OUTPATIENT
Start: 2023-08-07 | End: 2023-08-10 | Stop reason: HOSPADM

## 2023-08-07 RX ORDER — ASPIRIN 81 MG/1
81 TABLET ORAL DAILY
Status: DISCONTINUED | OUTPATIENT
Start: 2023-08-07 | End: 2023-08-10 | Stop reason: HOSPADM

## 2023-08-07 RX ORDER — SODIUM CHLORIDE 9 MG/ML
INJECTION, SOLUTION INTRAVENOUS
Status: DISCONTINUED | OUTPATIENT
Start: 2023-08-07 | End: 2023-08-10 | Stop reason: HOSPADM

## 2023-08-07 RX ORDER — ONDANSETRON 8 MG/1
8 TABLET, ORALLY DISINTEGRATING ORAL EVERY 8 HOURS PRN
Status: DISCONTINUED | OUTPATIENT
Start: 2023-08-07 | End: 2023-08-10 | Stop reason: HOSPADM

## 2023-08-07 RX ORDER — ONDANSETRON 2 MG/ML
4 INJECTION INTRAMUSCULAR; INTRAVENOUS EVERY 6 HOURS PRN
Status: DISCONTINUED | OUTPATIENT
Start: 2023-08-07 | End: 2023-08-10 | Stop reason: HOSPADM

## 2023-08-07 RX ORDER — ATORVASTATIN CALCIUM 40 MG/1
40 TABLET, FILM COATED ORAL DAILY
Status: DISCONTINUED | OUTPATIENT
Start: 2023-08-07 | End: 2023-08-07

## 2023-08-07 RX ORDER — ENOXAPARIN SODIUM 100 MG/ML
40 INJECTION SUBCUTANEOUS EVERY 24 HOURS
Status: DISCONTINUED | OUTPATIENT
Start: 2023-08-07 | End: 2023-08-08

## 2023-08-07 RX ORDER — ASPIRIN 325 MG
325 TABLET, DELAYED RELEASE (ENTERIC COATED) ORAL DAILY
Status: DISCONTINUED | OUTPATIENT
Start: 2023-08-07 | End: 2023-08-07

## 2023-08-07 RX ORDER — FUROSEMIDE 10 MG/ML
40 INJECTION INTRAMUSCULAR; INTRAVENOUS ONCE
Status: COMPLETED | OUTPATIENT
Start: 2023-08-07 | End: 2023-08-07

## 2023-08-07 RX ORDER — ATORVASTATIN CALCIUM 40 MG/1
80 TABLET, FILM COATED ORAL DAILY
Status: DISCONTINUED | OUTPATIENT
Start: 2023-08-07 | End: 2023-08-10 | Stop reason: HOSPADM

## 2023-08-07 RX ORDER — ESCITALOPRAM OXALATE 10 MG/1
10 TABLET ORAL NIGHTLY
Status: DISCONTINUED | OUTPATIENT
Start: 2023-08-08 | End: 2023-08-10 | Stop reason: HOSPADM

## 2023-08-07 RX ORDER — LABETALOL HYDROCHLORIDE 5 MG/ML
10 INJECTION, SOLUTION INTRAVENOUS
Status: DISCONTINUED | OUTPATIENT
Start: 2023-08-07 | End: 2023-08-10 | Stop reason: HOSPADM

## 2023-08-07 RX ADMIN — ATORVASTATIN CALCIUM 80 MG: 40 TABLET, FILM COATED ORAL at 01:08

## 2023-08-07 RX ADMIN — ASPIRIN 81 MG: 81 TABLET, COATED ORAL at 01:08

## 2023-08-07 RX ADMIN — SODIUM CHLORIDE: 9 INJECTION, SOLUTION INTRAVENOUS at 09:08

## 2023-08-07 RX ADMIN — LEVETIRACETAM 500 MG: 5 INJECTION INTRAVENOUS at 08:08

## 2023-08-07 RX ADMIN — CLOPIDOGREL BISULFATE 75 MG: 75 TABLET ORAL at 01:08

## 2023-08-07 RX ADMIN — ENOXAPARIN SODIUM 40 MG: 40 INJECTION SUBCUTANEOUS at 04:08

## 2023-08-07 RX ADMIN — FUROSEMIDE 40 MG: 10 INJECTION, SOLUTION INTRAMUSCULAR; INTRAVENOUS at 05:08

## 2023-08-07 RX ADMIN — ONDANSETRON 4 MG: 2 INJECTION INTRAMUSCULAR; INTRAVENOUS at 06:08

## 2023-08-07 RX ADMIN — LEVETIRACETAM 500 MG: 5 INJECTION INTRAVENOUS at 09:08

## 2023-08-07 NOTE — PT/OT/SLP EVAL
Occupational Therapy   Evaluation    Name: Paresh Vance Jr.  MRN: [id number]  Admitting Diagnosis: Seizure  Recent Surgery: * No surgery found *      Recommendations:     Discharge Recommendations: other (see comments) (high intensity therapy)  Discharge Equipment Recommendations:  to be determined by next level of care  Barriers to discharge:  Other (Comment) (Pt requires increased level of assistance)    Assessment:     Paresh Vance Jr. is a 57 y.o. male with a medical diagnosis of Seizure.  He presents with Diagnoses of CVA (cerebral vascular accident), Seizure, and Stroke were pertinent to this visit. Performance deficits affecting function: weakness, impaired endurance, gait instability, impaired functional mobility, impaired cognition, decreased coordination, decreased upper extremity function, decreased lower extremity function, impaired self care skills, impaired balance, decreased safety awareness, impaired coordination, impaired sensation, visual deficits, decreased ROM, impaired fine motor.      Pt would benefit from cont OT services in order to maximize functional independence. Recommending high intensity therapy upon d/c. OT eval performed this date with PT, pt agreeable to therapy. Pt with increased fatigue and decreased alertness; nsg reports pt received ativan this morning and in restraints. Pt endorses ongoing vision issues & reports multiple falls at home. Delayed processing & overall gross motor coordination throughout session. Pt requires ModA for sup<>sit. Pt requires Min/ModA x2 with B HHA for side steps to HOB & multiple LOB noted. Will progress as able.     Rehab Prognosis: Good; patient would benefit from acute skilled OT services to address these deficits and reach maximum level of function.       Plan:     Patient to be seen 5 x/week to address the above listed problems via self-care/home management, therapeutic activities, therapeutic exercises  Plan of Care Expires: 09/07/23  Plan  of Care Reviewed with: patient, daughter    Subjective     Chief Complaint: Double vision  Patient/Family Comments/goals: Pt & daughter frustrated by pt's vision issues, & states multiple Drs have reported it is from smoking    Occupational Profile:  Pt lives with son and DIL and their children in a SSH, 0 DAY, and T/S combo  Prior to admission, patients level of function was Independent for mobility and ADL's; has walking stick if needed; does not drive or work; endorses 3-4 falls due to tripping over objects.    Equipment used at home: (walking stick).     Upon discharge, patient will have assistance from family.    Pain/Comfort:  Pain Rating 1: 0/10    Patients cultural, spiritual, Bahai conflicts given the current situation: no    Objective:     Communicated with: nsg prior to session.  Patient found HOB elevated with oxygen, Condom Catheter, peripheral IV, telemetry upon OT entry to room.    General Precautions: Standard, fall  Orthopedic Precautions: N/A  Braces: N/A  Respiratory Status: Nasal cannula    Occupational Performance:    Bed Mobility:    Patient completed Supine to Sit with moderate assistance  Patient completed Sit to Supine with moderate assistance    Functional Mobility/Transfers:  Patient completed Sit <> Stand Transfer with minimum assistance and of 2 persons  with  B HHA   Functional Mobility: Pt requires Min/ModA x2 with B HHA for side steps to HOB & multiple LOB noted.    Activities of Daily Living:  Lower Body Dressing: minimum assistance seated EOB to toño B socks while forward flexing at trunk & bracing foot on edge of bed to reach    Cognitive/Visual Perceptual:  Cognitive/Psychosocial Skills:     -       Oriented to:  Patient is oriented to Person and hospital, month, and year with increased time and cueing; delayed processing speed and responses, low tone  -       Safety awareness/insight to disability: impaired   -       Mood/Affect/Coping skills/emotional control:  Cooperative    Physical Exam:  Upper Extremity Range of Motion:     -       Right Upper Extremity: WFL  -       Left Upper Extremity: WFL  Upper Extremity Strength:    -       Right Upper Extremity: 4/5  -       Left Upper Extremity: 4/5   Strength:    -       Right Upper Extremity: WFL  -       Left Upper Extremity: WFL  *Pt reports no sensation deficits BUE    AMPA 6 Click ADL:  AMPA Total Score: 15    Treatment & Education:  Pt would benefit from cont OT services in order to maximize functional independence.   Pt with increased fatigue and decreased alertness; nsg reports pt received ativan this morning and in restraints.   Pt endorses ongoing vision issues & reports multiple falls at home.   Delayed processing & overall gross motor coordination throughout session.   Pt requires ModA for sup<>sit.   Pt requires Min/ModA x2 with B HHA for side steps to HOB & multiple LOB noted.   Will progress as able.     Patient left HOB elevated with all lines intact, call button in reach, bed alarm on, nsg notified, and daughter present; restraints reapplied    GOALS:   Multidisciplinary Problems       Occupational Therapy Goals          Problem: Occupational Therapy    Goal Priority Disciplines Outcome Interventions   Occupational Therapy Goal     OT, PT/OT Ongoing, Progressing    Description: Goals to be met by: 9/7/2023     Patient will increase functional independence with ADLs by performing:    UE Dressing with Modified [place].  LE Dressing with Set-up Assistance.  Grooming while standing with Set-up Assistance.  Toileting from toilet with Supervision for hygiene and clothing management.   Supine to sit with Modified [place].  Step transfer with Supervision & appropriate AD.  Toilet transfer to toilet with Supervision & appropriate AD.                         History:     Past Medical History:   Diagnosis Date    Basal cell carcinoma (BCC) of upper lip 6/20/2021    Coronary artery disease involving native  coronary artery without angina pectoris 11/12/2015    Hyperlipidemia 11/12/2015    Hypertension     Syncope and collapse 6/29/2023         Past Surgical History:   Procedure Laterality Date    CORONARY ANGIOGRAPHY  2015    CORONARY ANGIOGRAPHY Right 5/12/2023    Procedure: ANGIOGRAM, CORONARY ARTERY;  Surgeon: Ryan Huertas MD;  Location: [facility] CATH LAB;  Service: Cardiology;  Laterality: Right;    EXCISION OF LESION OF LIP N/A 07/23/2021    Procedure: EXCISION, LESION, LIP;  Surgeon: Cezar Ellis MD;  [facility] FLR;  Service: ENT;  Laterality: N/A;    FLAP PROCEDURE N/A 08/12/2021    Procedure: CREATION, FREE FLAP;  Surgeon: Cezar Ellis MD;  Location: [facility] OR [facility] FLR;  Service: ENT;  Laterality: N/A;  Radial forearm    FOOT SURGERY      LIP RECONSTRUCTION Bilateral 08/24/2021    Procedure: RECONSTRUCTION, LIP;  Surgeon: Czear Ellis MD;  Location: [facility] OR [facility] FLR;  Service: ENT;  Laterality: Bilateral;    LIP RECONSTRUCTION N/A 10/08/2021    Procedure: RECONSTRUCTION, LIP;  Surgeon: Cezar Ellis MD;  [facility];  Service: ENT;  Laterality: N/A;    NECK EXPLORATION Left 08/12/2021    Procedure: EXPLORATION, NECK;  Surgeon: Cezar Ellis MD;  [facility];  Service: ENT;  Laterality: Left;    ROTATION FLAP SURGERY  07/23/2021    Procedure: CREATION, FLAP, ROTATION;  Surgeon: Cezar Ellis MD;  [facility];  Service: ENT;;    SKIN SPLIT GRAFT Left 08/12/2021    Procedure: APPLICATION, GRAFT, SKIN, SPLIT-THICKNESS;  Surgeon: Cezar Ellis MD;  [facility];  Service: ENT;  Laterality: Left;       Time Tracking:     OT Date of Treatment: 08/07/23  OT Start Time: 0954  OT Stop Time: 1019  OT Total Time (min): 25 min with PT    Billable Minutes:Evaluation 10  Therapeutic Activity 15    8/7/2023

## 2023-08-07 NOTE — PROGRESS NOTES
Smoking cessation education deferred. Pt arousable but unable to stay awake. Will follow up when patient condition improves.

## 2023-08-07 NOTE — Clinical Note
The PA catheter was removed from the right pulmonary artery. Hemodynamics were performed. O2 saturation was measured at 60%.

## 2023-08-07 NOTE — Clinical Note
The catheter was inserted into the ostium   right coronary artery. An angiography was performed of the right coronary arteries. Multiple views were taken. The angiography was performed via hand injection with 20 mL of contrast.

## 2023-08-07 NOTE — ASSESSMENT & PLAN NOTE
- NSTEMI earlier in the year with LHC showing severe 3v dz. CTS denied him for surgery  - Continue DAPT, statin  - Telemetry  - Holding anti-HTN due to possible CVA

## 2023-08-07 NOTE — PLAN OF CARE
Problem: SLP  Goal: SLP Goal  Description: Goals:  1. Pt will tolerate a mec soft diet with thin liquids w/o any overt s/s of aspiration.   2. Pt will participate in full cognitive-linguistic eval to determine functional deficits pending MRI results.     Outcome: Ongoing, Progressing    B/s swallow eval completed. Pt with fluctuating ANDRE and confusion. ST recs [facility] soft diet with thin liquids. Meds crushed in puree.

## 2023-08-07 NOTE — H&P
"[facility] Medicine  History & Physical    Patient Name: Paresh Vance Jr.  MRN: [id number]  Patient Class: OP- Observation  Admission Date: 8/7/2023  Attending Physician: Henry Hsu MD   Primary Care Provider: Michaela, Primary Doctor         Patient information was obtained from relative(s), EMS personnel, past medical records and ER records.     Subjective:     Principal Problem:<principal problem not specified>    Chief Complaint: No chief complaint on file.       HPI: 58 yo male with a PMHx of HTN, HLD, 3v CAD, severe valvular disease (severe MR, mod AR, mod AS) here for AMS and seizure vs possible stroke.    History obtained from son and daughter as patient is altered. Son found his father unable to speak and eventually fell to the ground scrapping his elbow and nose. When he was able to get him up, he was altered and had trouble speaking prompting EMS arrival. Family says he does not drink EtOH or do any drugs except for marijuana. Has not had any significant medical issues, except for a NSTEM earlier this year.    At [facility], the patient was noted to have apparently an expressive aphasia prompting stroke code. CTH negative for ICH but did show a small, likely old lacunar infarct. He then developed a seizure, or "seizure like activity", in the CT scan. He received 3mg IV ativan and loaded with Keppra with resolution of symptoms and was post-ictal. Neurology recommending MRI and neuro consult.      Past Medical History:   Diagnosis Date    Basal cell carcinoma (BCC) of upper lip 6/20/2021    Coronary artery disease involving native coronary artery without angina pectoris 11/12/2015    Hyperlipidemia 11/12/2015    Hypertension     Syncope and collapse 6/29/2023       Past Surgical History:   Procedure Laterality Date    CORONARY ANGIOGRAPHY  2015    CORONARY ANGIOGRAPHY Right 5/12/2023    Procedure: ANGIOGRAM, CORONARY ARTERY;  Surgeon: Ryan Huertas MD;  Location: [facility] CATH LAB;  " Service: Cardiology;  Laterality: Right;    EXCISION OF LESION OF LIP N/A 07/23/2021    Procedure: EXCISION, LESION, LIP;  Surgeon: Cezar Ellis MD;  Location: [facility] OR 2ND FLR;  Service: ENT;  Laterality: N/A;    FLAP PROCEDURE N/A 08/12/2021    Procedure: CREATION, FREE FLAP;  Surgeon: Cezar Ellis MD;  [facility];  Service: ENT;  Laterality: N/A;  Radial forearm    FOOT SURGERY      LIP RECONSTRUCTION Bilateral 08/24/2021    Procedure: RECONSTRUCTION, LIP;  Surgeon: Cezar Ellis MD;  [facility];  Service: ENT;  Laterality: Bilateral;    LIP RECONSTRUCTION N/A 10/08/2021    Procedure: RECONSTRUCTION, LIP;  Surgeon: Cezar Ellis MD;  Location: [facility] OR 2ND FLR;  Service: ENT;  Laterality: N/A;    NECK EXPLORATION Left 08/12/2021    Procedure: EXPLORATION, NECK;  Surgeon: Cezar Ellis MD;  Location: [facility] OR 2[facility] FLR;  Service: ENT;  Laterality: Left;    ROTATION FLAP SURGERY  07/23/2021    Procedure: CREATION, FLAP, ROTATION;  Surgeon: Cezar Ellis MD;  Location: [facility] OR 2ND FLR;  Service: ENT;;    SKIN SPLIT GRAFT Left 08/12/2021    Procedure: APPLICATION, GRAFT, SKIN, SPLIT-THICKNESS;  Surgeon: Cezar Ellis MD;  [facility];  Service: ENT;  Laterality: Left;       Review of patient's allergies indicates:  No Known Allergies    Current Facility-Administered Medications on File Prior to Encounter   Medication    [COMPLETED] iohexoL (OMNIPAQUE 350) injection 80 mL    [COMPLETED] levETIRAcetam in NaCl (iso-os) (KEPPRA) 1,000 mg/100 mL IVPB    [COMPLETED] LORazepam injection 1 mg    [COMPLETED] LORazepam injection 2 mg    [DISCONTINUED] levETIRAcetam in NaCl (iso-os) (KEPPRA) 1,000 mg/100 mL IVPB    [DISCONTINUED] levETIRAcetam injection 2,000 mg     Current Outpatient Medications on File Prior to Encounter   Medication Sig    aspirin 81 MG Chew Take 1 tablet (81 mg total) by mouth once daily.     atorvastatin (LIPITOR) 80 MG tablet Take 1 tablet (80 mg total) by mouth once daily.    furosemide (LASIX) 40 MG tablet Take 1 tablet (40 mg total) by mouth once daily.    hydrocortisone 2.5 % cream Apply topically 2 (two) times daily.    losartan (COZAAR) 25 MG tablet Take 1 tablet (25 mg total) by mouth once daily.    EScitalopram oxalate (LEXAPRO) 10 MG tablet Take 1 tablet (10 mg total) by mouth every evening.    hydrOXYzine pamoate (VISTARIL) 25 MG Cap Take 1 capsule (25 mg total) by mouth 4 (four) times daily. (Patient not taking: Reported on 6/12/2023)    nicotine (NICODERM CQ) 21 mg/24 hr Place 1 patch onto the skin once daily. (Patient not taking: Reported on 6/12/2023)     Family History       Problem Relation (Age of Onset)    Heart disease Father    No Known Problems Mother, Sister, Maternal Grandmother, Maternal Grandfather, Paternal Grandmother, Paternal Grandfather, Brother, Maternal Aunt, Maternal Uncle, Paternal Aunt, Paternal Uncle          Tobacco Use    Smoking status: Every Day     Current packs/day: 0.50     Types: Cigarettes    Smokeless tobacco: Never   Substance and Sexual Activity    Alcohol use: Not Currently    Drug use: Yes     Types: Marijuana    Sexual activity: Not on file     Review of Systems   Unable to perform ROS: Mental status change     Objective:     Vital Signs (Most Recent):  Temp: 98.2 °F (36.8 °C) (08/07/23 0312)  Pulse: 104 (08/07/23 0403)  Resp: 16 (08/07/23 0312)  BP: (!) 148/72 (08/07/23 0312)  SpO2: 98 % (08/07/23 0312) Vital Signs (24h Range):  Temp:  [97.3 °F (36.3 °C)-98.2 °F (36.8 °C)] 98.2 °F (36.8 °C)  Pulse:  [] 104  Resp:  [16-38] 16  SpO2:  [91 %-100 %] 98 %  BP: (119-183)/(58-92) 148/72     Weight: 66 kg (145 lb 8.1 oz)  Body mass index is 18.19 kg/m².     Physical Exam  Vitals and nursing note reviewed.   Constitutional:       Appearance: Normal appearance. He is normal weight. He is not toxic-appearing.   HENT:      Head: Normocephalic and  atraumatic.      Mouth/Throat:      Mouth: Mucous membranes are dry.   Eyes:      Extraocular Movements: Extraocular movements intact.      Pupils: Pupils are equal, round, and reactive to light.   Cardiovascular:      Rate and Rhythm: Regular rhythm. Tachycardia present.      Pulses: Normal pulses.      Heart sounds: Murmur heard.   Pulmonary:      Effort: Pulmonary effort is normal.      Breath sounds: Rales present. No wheezing.   Abdominal:      General: Abdomen is flat. Bowel sounds are normal. There is no distension.      Palpations: Abdomen is soft. There is no mass.   Musculoskeletal:         General: No swelling.   Skin:     General: Skin is warm.      Capillary Refill: Capillary refill takes less than 2 seconds.   Neurological:      Mental Status: He is disoriented.      Comments: Moving all 4 extremities spontaneously. Opens eyes spontaneously.              CRANIAL NERVES     CN III, IV, VI   Pupils are equal, round, and reactive to light.       Significant Labs: All pertinent labs within the past 24 hours have been reviewed.    Significant Imaging: I have reviewed all pertinent imaging results/findings within the past 24 hours.    Assessment/Plan:     Seizure  - Remains altered but improving. Could be post ictal + ativan use  - S/p Keppra load  - Continue IV Keppra 500mg q12h  - neurology consulted  - EEG pending      Aphasia  - Concern for acute CVA as he had reported expressive aphasia  - MRI brain pending  - A1c and lipids pending  - ASA, plavix, lipitor 80mg  - Neurology consult  - Permissive HTN with SBP < 220 unless HTN worsens MR or AR      Coronary artery disease involving native coronary artery without angina pectoris  - NSTEMI earlier in the year with LHC showing severe 3v dz. CTS denied him for surgery  - Continue DAPT, statin  - Telemetry  - Holding anti-HTN due to possible CVA      Valvular heart disease with severe MR, mod AR and AS  - Per CTS notes, not a candidate for CABG or valve  replacement  - Considering TAVR first with revascularization then potential LAUREANO  - Stable hemodynamically  - Monitor with mild diuresis as needed      Essential hypertension  - Holding anti-HTN for now      Tobacco abuse  - Needs further counseling on tobacco cessation  - Discuss when awake  - Place nicotine patch when more awake        VTE Risk Mitigation (From admission, onward)         Ordered     enoxaparin injection 40 mg  Every 24 hours         08/07/23 0252     IP VTE HIGH RISK PATIENT  Once         08/07/23 0252     Place sequential compression device  Until discontinued         08/07/23 0252                   On 08/07/2023, patient should be placed in hospital observation services under my care.        Reese Rooney MD  Department of Hospital Medicine  [facility] - Telemetry

## 2023-08-07 NOTE — PLAN OF CARE
Pt would benefit from cont OT services in order to maximize functional independence. Recommending high intensity therapy upon d/c. OT marcello performed this date with PT, pt agreeable to therapy. Pt with increased fatigue and decreased alertness; nsg reports pt received ativan this morning and in restraints. Pt endorses ongoing vision issues & reports multiple falls at home. Delayed processing & overall gross motor coordination throughout session. Pt requires ModA for sup<>sit. Pt requires Min/ModA x2 with B HHA for side steps to HOB & multiple LOB noted. Will progress as able.     Problem: Occupational Therapy  Goal: Occupational Therapy Goal  Description: Goals to be met by: 9/7/2023     Patient will increase functional independence with ADLs by performing:    UE Dressing with Modified [place].  LE Dressing with Set-up Assistance.  Grooming while standing with Set-up Assistance.  Toileting from toilet with Supervision for hygiene and clothing management.   Supine to sit with Modified [place].  Step transfer with Supervision & appropriate AD.  Toilet transfer to toilet with Supervision & appropriate AD.    Outcome: Ongoing, Progressing

## 2023-08-07 NOTE — PT/OT/SLP EVAL
Physical Therapy Evaluation and Treatment    Patient Name:  Paresh Vance Jr.   MRN:  [id number]    Recommendations:     Discharge Recommendations:  (high intensity therapy)   Discharge Equipment Recommendations: to be determined by next level of care   Barriers to discharge:  increased assist required    Assessment:     Paresh Vance Jr. is a 57 y.o. male admitted with a medical diagnosis of Seizure.  He presents with the following impairments/functional limitations: weakness, gait instability, impaired balance, impaired endurance, impaired self care skills, impaired functional mobility, decreased lower extremity function, decreased upper extremity function, decreased coordination, impaired coordination, decreased safety awareness, impaired sensation, visual deficits, impaired skin, impaired fine motor, impaired cognition .Pt with increased fatigue and decreased alertness; nsg reports pt received ativan this morning and in restraints. Pt endorses double vision which has been present for a while and prior to hospital admission. No nystagmus noted or reports of change in dizziness with positional changes. Possible L ankle dorsiflexion weakness noted, however, possibly due to decreased command following and alertness. Delayed processing speed and responses noted. Pt was able to perform sit <>stand with Blaine x 2 and side steps with Min-ModA x 2 with B HHA. Pt unsafe to ambulate further due to safety, increased fatigue, and decreased command following.      Despite patient's co-morbidities, patient was living in community setting functioning at Independent level for ADLs, and mobility prior to this event.  There is an expectation of returning to prior level of function to maintain independence thus avoiding readmission.  Recommending high intensity therapy as the pt has the ability to actively participate in 3 hours of therapy.  A lower level of care cannot provide the interdisciplinary treatment approach needed.      Rehab Prognosis: Good; patient would benefit from acute skilled PT services to address these deficits and reach maximum level of function.    Recent Surgery: * No surgery found *      Plan:     During this hospitalization, patient to be seen 6 x/week to address the identified rehab impairments via gait training, therapeutic activities, therapeutic exercises, neuromuscular re-education and progress toward the following goals:    Plan of Care Expires:  09/07/23    Subjective     Chief Complaint: double vision  Patient/Family Comments/goals: pt and daughter reports pt has been having double vision for a while now and has been seen by doctors who report it is from smoking   Pain/Comfort:  Pain Rating 1: 0/10    Patients cultural, spiritual, Restorationist conflicts given the current situation: no    Living Environment:  Pt lives with son and DIL and their children in a [facility], 0 DAY, and T/S combo  Prior to admission, patients level of function was Independent for mobility and ADL's; has walking stick if needed; does not drive or work; endorses 3-4 falls due to tripping over objects.  Equipment used at home:  (walking stick).   Upon discharge, patient will have assistance from family.    Objective:     Communicated with nsg prior to session.  Patient found HOB elevated with oxygen, peripheral IV, telemetry, Condom Catheter  upon PT entry to room.    General Precautions: Standard, fall  Orthopedic Precautions:N/A   Braces: N/A  Respiratory Status: Nasal cannula    Exams:  Cognitive Exam:  Patient is oriented to Person and hospital, month, and year with increased time and cueing; delayed processing speed and responses, low tone  Possible slight R facial droop, however, may be pt's normal structural features   Fine Motor Coordination:    -       Delayed speed and requires max cueing and demonstration to perform  Left hand thumb/finger opposition skills, Right hand thumb/finger opposition skills, Left hand, diadochokinesis  skill, and Right hand, diadochokinesis skill   Postural Exam:  Patient presented with the following abnormalities:    -       Rounded shoulders  -       Forward head  Sensation: pt denies any numbness and tingling, grossly intact with screening LT  Skin Integrity/Edema:      -       Skin integrity:  abrasion nose  RLE ROM: WFL  RLE Strength: WFL except hip flexion 3/5 possibly due to decreased command following   LLE ROM: WFL except for L ankle DF pt with decreased active movement and ~75% of ROM  LLE Strength: L ankle DF 3-/5, knee ext WFL, hip flexion 3/5 possibly due to decreased command following    Functional Mobility:  Bed Mobility:     Supine to Sit: moderate assistance  Sit to Supine: moderate assistance  Transfers:     Sit to Stand:  minimum assistance and of 2 persons with hand-held assist  Gait: ~3-4 side steps R toward HOB with Min-ModA x 2 with B HHA; pt unsteady with 2-3 mild LOB and requiring cueing to initiate taking steps      AM-PAC 6 CLICK MOBILITY  Total Score:        Treatment & Education:  Pt and daughter educated on role of PT/POC  Pt with increased fatigue and decreased alertness; nsg reports pt received ativan this morning and in restraints.   Pt endorses double vision which has been present for a while and prior to hospital admission.   No nystagmus noted or reports of change in dizziness with positional changes.   Delayed processing speed and responses noted.   Restraints doffed and pt transitioned supine>sit  Pt requiring Mod-MaxA initially for sitting balance due to posterior lean and attempts to return supine and back to sleep, pt unable to correct without assist  Pt progressed to CGA for static sitting balance with max cueing for midline  Pt was able to perform sit <>stand with Blaine x 2 and side steps with Min-ModA x 2 with B HHA.   Pt unsafe to ambulate further due to safety, increased fatigue, and decreased command following.   Pt returned supine and gradually unable to follow  further commands due to falling asleep / decreased alertness  Restraints reapplied     Patient left HOB elevated with all lines intact, call button in reach, bed alarm on, nsg notified, and daughter present.    GOALS:   Multidisciplinary Problems       Physical Therapy Goals          Problem: Physical Therapy    Goal Priority Disciplines Outcome Goal Variances Interventions   Physical Therapy Goal     PT, PT/OT Ongoing, Progressing     Description: Goals to be met by: 23     Patient will increase functional independence with mobility by performin. Supine to sit with Stand-by Assistance  2. Sit to supine with Stand-by Assistance  3. Sit to stand transfer with Contact Guard Assistance  4. Bed to chair transfer with Contact Guard Assistance using LRAD.  5. Gait  x 50 feet with Contact Guard Assistance using LRAD.   6. Lower extremity exercise program x10 reps per handout, with independence                         History:     Past Medical History:   Diagnosis Date    Basal cell carcinoma (BCC) of upper lip 2021    Coronary artery disease involving native coronary artery without angina pectoris 2015    Hyperlipidemia 2015    Hypertension     Syncope and collapse 2023       Past Surgical History:   Procedure Laterality Date    CORONARY ANGIOGRAPHY      CORONARY ANGIOGRAPHY Right 2023    Procedure: ANGIOGRAM, CORONARY ARTERY;  Surgeon: Ryan Huertas MD;  Location: [facility]note 3[facility] CATH LAB;  Service: Cardiology;  Laterality: Right;    EXCISION OF LESION OF LIP N/A 2021    Procedure: EXCISION, LESION, LIP;  Surgeon: Cezar Ellis MD;  Location: [facility] OR [address] [place] [address];  Service: ENT;  Laterality: N/A;    FLAP PROCEDURE N/A 2021    Procedure: CREATION, FREE FLAP;  Surgeon: Cezar Ellis MD;  Location: [facility] OR [address] [place] [address];  Service: ENT;  Laterality: N/A;  Radial forearm    FOOT SURGERY      LIP RECONSTRUCTION Bilateral 2021    Procedure: RECONSTRUCTION, LIP;  Surgeon:  Cezar Ellis MD;  [facility];  Service: ENT;  Laterality: Bilateral;    LIP RECONSTRUCTION N/A 10/08/2021    Procedure: RECONSTRUCTION, LIP;  Surgeon: Cezar Ellis MD;  [facility];  Service: ENT;  Laterality: N/A;    NECK EXPLORATION Left 08/12/2021    Procedure: EXPLORATION, NECK;  Surgeon: Cezar Ellis MD;  [facility];  Service: ENT;  Laterality: Left;    ROTATION FLAP SURGERY  07/23/2021    Procedure: CREATION, FLAP, ROTATION;  Surgeon: Cezar Ellis MD;  Location: [facility] OR [address] [place] [address];  Service: ENT;;    SKIN SPLIT GRAFT Left 08/12/2021    Procedure: APPLICATION, GRAFT, SKIN, SPLIT-THICKNESS;  Surgeon: Cezar Ellis MD;  Location: [facility] OR [address] [place] [address];  Service: ENT;  Laterality: Left;       Time Tracking:     PT Received On: 08/07/23  PT Start Time: 0954     PT Stop Time: 1018  PT Total Time (min): 24 min cotx with OT    Billable Minutes: Evaluation 10 and Therapeutic Activity 14      08/07/2023

## 2023-08-07 NOTE — PLAN OF CARE
Problem: Physical Therapy  Goal: Physical Therapy Goal  Description: Goals to be met by: 23     Patient will increase functional independence with mobility by performin. Supine to sit with Stand-by Assistance  2. Sit to supine with Stand-by Assistance  3. Sit to stand transfer with Contact Guard Assistance  4. Bed to chair transfer with Contact Guard Assistance using LRAD.  5. Gait  x 50 feet with Contact Guard Assistance using LRAD.   6. Lower extremity exercise program x10 reps per handout, with independence    Outcome: Ongoing, Progressing     PT Eval completed, note to follow. Pt with increased fatigue and decreased alertness; nsg reports pt received ativan this morning and in restraints. Pt endorses double vision which has been present for a while and prior to hospital admission. No nystagmus noted or reports of change in dizziness with positional changes. Possible L ankle dorsiflexion weakness noted, however, possibly due to decreased command following and alertness. Delayed processing speed and responses noted. Pt was able to perform sit <>stand with Blaine x 2 and side steps with Min-ModA x 2 with B HHA. Pt unsafe to ambulate further due to safety, increased fatigue, and decreased command following.     Despite patient's co-morbidities, patient was living in community setting functioning at Independent level for ADLs, and mobility prior to this event.  There is an expectation of returning to prior level of function to maintain independence thus avoiding readmission.  Recommending high intensity therapy as the pt has the ability to actively participate in 3 hours of therapy.  A lower level of care cannot provide the interdisciplinary treatment approach needed.

## 2023-08-07 NOTE — CONSULTS
NEUROLOGY FLOOR CONSULT    Reason for consult:  seizure with concern for stroke    CC:  seizure with concern for stroke    HPI:   Paresh Vance Jr. is a 57 y.o. w/ Hx of HTN, HLD, 3 vessel CAD, valvular disease, and tobacco use who presented as transfer from [place] with AMS and reported expressive aphasia with witnessed seizure like activity at OSH, transferred for MRI and neuro consult. Patient was found by his son unable to speak and subsequent fall to the ground, with no reported seizure activity at that time. Patient's son was unable to get his father up, for which EMS was called. He was noted to be unable to get up and had trouble speaking. Patient denies EtOH and illicit drug use other than for marijuana. He denies prior hx of stroke or seizures. On arrival to [place], patient was noted to have reported expressive aphasia, prompting code stroke. CTH/CTA unremarkable for acute intracranial processes or intracranial flow-limiting stenosis/ LVO. While in the scanner at the OSH, patient with observed seizure like activity for which he was given 3 mg ativan x 1 and keppra 1 g. He is currently on keppra 500 mg BID. Patient remains post-ictal today, but is able to answer questions appropriately. He denies hx of seizure and he denies speech disturbance, focal weakness/sensory changes, vision changes, headache, nausea, and vertigo. He denies EtOH use. He endorses marijuana use but denies other illicit drugs.        ROS: 12 point ROS negative unless stated otherwise in the HPI.     Histories:     Allergies:  Patient has no known allergies.    Current Medications:    Current Facility-Administered Medications   Medication Dose Route Frequency Provider Last Rate Last Admin    aspirin EC tablet 81 mg  81 mg Oral Daily Reese Rooney MD        atorvastatin tablet 80 mg  80 mg Oral Daily Reese Rooney MD        clopidogreL tablet 75 mg  75 mg Oral Daily Reese Rooney MD        enoxaparin injection 40  mg  40 mg Subcutaneous Q24H (prophylaxis, 1700) Reese Rooney MD        [START ON 8/8/2023] EScitalopram oxalate tablet 10 mg  10 mg Oral QHS Reese Rooney MD        labetaloL injection 10 mg  10 mg Intravenous Q15 Min PRN Reese Rooney MD        levETIRAcetam in NaCl (iso-os) IVPB 500 mg  500 mg Intravenous Q12H Reese Rooney MD   Stopped at 08/07/23 0923    ondansetron disintegrating tablet 8 mg  8 mg Oral Q8H PRN Reese Rooney MD        sodium chloride 0.9% bolus 500 mL 500 mL  500 mL Intravenous Continuous PRN Abhinav Garcia MD        sodium chloride 0.9% flush 10 mL  10 mL Intravenous PRN Reese Rooney MD        sodium chloride 0.9% flush 10 mL  10 mL Intravenous PRN Abhinav Garcia MD           Past Medical/Surgical/Family History:  Medical:   Past Medical History:   Diagnosis Date    Basal cell carcinoma (BCC) of upper lip 6/20/2021    Coronary artery disease involving native coronary artery without angina pectoris 11/12/2015    Hyperlipidemia 11/12/2015    Hypertension     Syncope and collapse 6/29/2023      Surgeries:   Past Surgical History:   Procedure Laterality Date    CORONARY ANGIOGRAPHY  2015    CORONARY ANGIOGRAPHY Right 5/12/2023    Procedure: ANGIOGRAM, CORONARY ARTERY;  Surgeon: Ryan Huertas MD;  [facility]: [facility] CATH LAB;  Service: Cardiology;  Laterality: Right;    EXCISION OF LESION OF LIP N/A 07/23/2021    Procedure: EXCISION, LESION, LIP;  Surgeon: Cezar Ellis MD;  [facility];  Service: ENT;  Laterality: N/A;    FLAP PROCEDURE N/A 08/12/2021    Procedure: CREATION, FREE FLAP;  Surgeon: Cezar Ellis MD;  [facility];  Service: ENT;  Laterality: N/A;  Radial forearm    FOOT SURGERY      LIP RECONSTRUCTION Bilateral 08/24/2021    Procedure: RECONSTRUCTION, LIP;  Surgeon: Cezar Ellis MD;  [facility];  Service: ENT;  Laterality: Bilateral;    LIP RECONSTRUCTION N/A 10/08/2021     Procedure: RECONSTRUCTION, LIP;  Surgeon: Cezar Ellis MD;  Location: [facility] OR [facility];  Service: ENT;  Laterality: N/A;    NECK EXPLORATION Left 08/12/2021    Procedure: EXPLORATION, NECK;  Surgeon: Cezar Ellis MD;  Location: [facility] OR [facility];  Service: ENT;  Laterality: Left;    ROTATION FLAP SURGERY  07/23/2021    Procedure: CREATION, FLAP, ROTATION;  Surgeon: Cezar Ellis MD;  [facility];  Service: ENT;;    SKIN SPLIT GRAFT Left 08/12/2021    Procedure: APPLICATION, GRAFT, SKIN, SPLIT-THICKNESS;  Surgeon: Cezar Ellis MD;  [facility];  Service: ENT;  Laterality: Left;      Family:   Family History   Problem Relation Age of Onset    Heart disease Father     No Known Problems Mother     No Known Problems Sister     No Known Problems Maternal Grandmother     No Known Problems Maternal Grandfather     No Known Problems Paternal Grandmother     No Known Problems Paternal Grandfather     No Known Problems Brother     No Known Problems Maternal Aunt     No Known Problems Maternal Uncle     No Known Problems Paternal Aunt     No Known Problems Paternal Uncle     Anemia Neg Hx     Arrhythmia Neg Hx     Asthma Neg Hx     Clotting disorder Neg Hx     Fainting Neg Hx     Heart attack Neg Hx     Heart failure Neg Hx     Hyperlipidemia Neg Hx     Hypertension Neg Hx     Stroke Neg Hx     Atrial Septal Defect Neg Hx        Social History:  Social History     Tobacco Use    Smoking status: Every Day     Current packs/day: 0.50     Types: Cigarettes    Smokeless tobacco: Never   Substance Use Topics    Alcohol use: Not Currently    Drug use: Yes     Types: Marijuana       Current Evaluation:     Vital Signs:   Vitals:    08/07/23 1213   BP:    Pulse: 96   Resp:    Temp:         Neurological Examination   Orientation  Drowsy, post-ictal, awakens to voice, requires repeated verbal stimulus to remain awake and answer questions; oriented to self, place, month and  year  Memory  Able to recall past events, limited currently due to post-ictal state  Language  Hypophonic speech; No dysarthria or aphasia noted.  Cranial Nerves  PERRL, VF intact, EOMI, V1-V3 intact, symmetric facial expression, hearing grossly intact, SCM & TPZ 5/5, tongue midline, symmetric palate elevation.  Motor  Normal Bulk  Normal Tone  5/5 strength in 4 extremities  Sensory  Normal to light touch and pinprick throughout  DTR   +2 symmetric in bilateral Upper extremities, 1+ in bilateral patellar and ankle  plantar response flexor bilaterally  Cerebellar/Gait  Normal finger to nose and heel to shin.   Gait deferred for safety due to post-ictal state    LABORATORY STUDIES:  I have personally reviewed patient's labs for this admission.    NEURORADIOLOGY/NEURODIAGNOSTIC STUDIES:  I have personally reviewed the images and neurodiagnostic studies performed.     CTH/CTA head and neck:  No acute intracranial abnormality, no evidence of intracranial flow-limiting stenosis or LVO; significant stenosis involving short segment of L subclavian artery distal to the origin with diminutive but patent L vertebral artery       Assessment:  SEVEN Vance Jr. is a 57 y.o. w/ Hx of HTN, HLD, 3 vessel CAD, valvular disease, and tobacco use who presented as transfer from [place] with AMS and reported expressive aphasia with witnessed seizure like activity at OSH, transferred for MRI and neuro consult. CTH negative for acute intracranial abnormality. CTA head and neck with no significant intracranial flow-limiting stenosis or LVO, and shows significant stenosis involving short segment of L subclavian artery distal to the origin of L vert with diminutive but patent L vertebral artery; however, patient is R vertebral artery dominant. On prior CTA neck in 2020, this was noted to be around 50% stenotic.     Patient appears post-ictal on neurologic exam but is non focal. Speech is hypophonic, but no apparent dysarthria or  aphasia noted, but difficult to assess given post-ictal state. Suspect patient's presentation is due to first time seizure requiring further work up for etiology since there are no clear provoking factors. Lower suspicion for acute stroke, but given multiple CV/stroke risk factors and first time seizure, will rule out stroke as a cause for new onset seizure.     Recommendations:  - q 4 hr neurochecks  - agree with MRI Brain w wo contrast given patient's extensive smoking hx and new onset seizures with no clear provoking factor  - EEG ordered  - continue keppra 500 mg BID; seizure precautions   - for now, continue ASA 81 mg daily monotherapy until MRI Brain results, unless DAPT is indicated from a cardiovascular standpoint  - on home lipitor 80 mg daily  - recommend consult to vascular surgery versus cardiology to review CTA neck finding of significant stenosis to L subclavian artery; on prior imaging in 2020, this was reported as ~50% stenotic   - TTE ordered by primary team, will follow up read  - okay for normotension, SBP goal <160, management per primary team  - telemetry   - normoglycemia, management per primary team; glucose goal 100-180  - PT/OT/SLP consulted  - once patient is awake and more alert, discuss seizure precautions  - remainder of care per primary team    Case to be discussed and staffed with Dr. Berrios.    Will continue to follow.  Please call for any questions regarding patient's neurologic care or for any neurologic demise.     Fabrizio Castellon MD  [employer] [facility], PGY-IV

## 2023-08-07 NOTE — ASSESSMENT & PLAN NOTE
- Per CTS notes, not a candidate for CABG or valve replacement  - Considering TAVR first with revascularization then potential LAUREANO  - Stable hemodynamically  - Monitor with mild diuresis as needed

## 2023-08-07 NOTE — Clinical Note
The catheter was inserted into the ostium   left main. An angiography was performed Multiple views were taken. The angiography was performed via hand injection with 30 mL of contrast.

## 2023-08-07 NOTE — ASSESSMENT & PLAN NOTE
- Concern for acute CVA as he had reported expressive aphasia  - MRI brain pending  - A1c and lipids pending  - ASA, plavix, lipitor 80mg  - Neurology consult  - Permissive HTN with SBP < 220 unless HTN worsens MR or AR

## 2023-08-07 NOTE — ASSESSMENT & PLAN NOTE
- Remains altered but improving. Could be post ictal + ativan use  - S/p Keppra load  - Continue IV Keppra 500mg q12h  - neurology consulted  - EEG pending

## 2023-08-07 NOTE — TREATMENT PLAN
Seen and examined. Apparently was climbing out of bed earlier today, upon my assessment was somnolent. Did not receive medications since ativan at OSH that would contribute to this. Did have witnessed seizure PTA. Also with need to r/o stroke given aphasia and acute neuro changes. On stroke pathway, ASA/plavix/statin; getting MRI. Initiated on keppra, s/p load at OSH. EEG pending. Neuro consulted.    Abhinav Garcia MD  [facility]

## 2023-08-07 NOTE — Clinical Note
The catheter was inserted into the ostium   left main. An angiography was performed of the left coronary arteries. The angiography was performed via hand injection with 10 mL of contrast.

## 2023-08-07 NOTE — PT/OT/SLP EVAL
Speech Language Pathology Evaluation  Bedside Swallow    Patient Name:  Paresh Vance Jr.   MRN:  [id number]  Admitting Diagnosis: Seizure    Recommendations:                 General Recommendations:  Cognitive-linguistic evaluation pending MRI results   Diet recommendations:  Soft & Bite Sized Diet - IDDSI Level 6, Thin liquids - IDDSI Level 0   Aspiration Precautions: 1 bite/sip at a time, Assistance with meals, HOB to 90 degrees, Meds crushed in puree, and Small bites/sips   General Precautions: Standard,    Communication strategies:  provide increased time to answer    Assessment:     Paresh Vance Jr. is a 57 y.o. male with a dx of Seizure, who presents with fluctuating alertness as well as generalized confusion. Pt with mod oral dysphagia, which is impacted by scattered dentition, as well as ANDRE. ST recs [facility] soft diet with thin liquids. Meds crushed in puree. ST following and will conduct cog eval, pending MRI results.     History:     Past Medical History:   Diagnosis Date    Basal cell carcinoma (BCC) of upper lip 6/20/2021    Coronary artery disease involving native coronary artery without angina pectoris 11/12/2015    Hyperlipidemia 11/12/2015    Hypertension     Syncope and collapse 6/29/2023       Past Surgical History:   Procedure Laterality Date    CORONARY ANGIOGRAPHY  2015    CORONARY ANGIOGRAPHY Right 5/12/2023    Procedure: ANGIOGRAM, CORONARY ARTERY;  Surgeon: Ryan Huertas MD;  Location: [facility] C[facility] LAB;  Service: Cardiology;  Laterality: Right;    EXCISION OF LESION OF LIP N/A 07/23/2021    Procedure: EXCISION, LESION, LIP;  Surgeon: Cezar Ellis MD;  [facility];  Service: ENT;  Laterality: N/A;    FLAP PROCEDURE N/A 08/12/2021    Procedure: CREATION, FREE FLAP;  Surgeon: Cezar Ellis MD;  [facility];  Service: ENT;  Laterality: N/A;  Radial forearm    FOOT SURGERY      LIP RECONSTRUCTION Bilateral 08/24/2021    Procedure: RECONSTRUCTION, LIP;   "Surgeon: Cezar Ellis MD;  Location: [facility] OR [facility] FLR;  Service: ENT;  Laterality: Bilateral;    LIP RECONSTRUCTION N/A 10/08/2021    Procedure: RECONSTRUCTION, LIP;  Surgeon: Cezar Ellis MD;  Location: [facility] OR [facility] FLR;  Service: ENT;  Laterality: N/A;    NECK EXPLORATION Left 08/12/2021    Procedure: EXPLORATION, NECK;  Surgeon: Cezar Ellis MD;  Location: [facility] OR [facility] FLR;  Service: ENT;  Laterality: Left;    ROTATION FLAP SURGERY  07/23/2021    Procedure: CREATION, FLAP, ROTATION;  Surgeon: Cezar Ellis MD;  Location: [facility] OR [facility] FLR;  Service: ENT;;    SKIN SPLIT GRAFT Left 08/12/2021    Procedure: APPLICATION, GRAFT, SKIN, SPLIT-THICKNESS;  Surgeon: Cezar Ellis MD;  [facility];  Service: ENT;  Laterality: Left;       Social History: Patient lives with family.    Prior Intubation HX:  None    Modified Barium Swallow: None per EMR    Chest X-Rays:   8/6/23    CONCLUSION:     Bilateral increased interstitial opacities and vascular congestive changes suggests CHF.  Pneumonitis which is nonspecific also is a consideration.     Stable prominent cardiac silhouette.    Prior diet: Per dtr, unrestricted at home.    Subjective     ST obtained clearance from pt's nurse for b/s swallow evaluation prior to entrance. Upon ST's entrance, pt was asleep, with dtr at beside. Pt required mod verbal and tactile cues to arouse, as well as to sustain alertness 2/2 fluctuating alertness during entirety of eval. Pt's dtr stating pt was given Ativan last night in the ED and has been "drowsy" ever since. Pt appearing with cont dcr ANDRE, as well as generalized confusion, however, starting to ask why he is in the hospital.     Patient goals: Per dtr, resume po intake      Pain/Comfort:  Pain Rating 1: 0/10    Respiratory Status: Room air    Objective:     Oral Musculature Evaluation  Oral Musculature: general weakness (Baseline R-sided weakness and asymmetry from oral surgery, per " dtr)  Dentition: scattered dentition (Edentulous upper; scattered lower)  Secretion Management: adequate  Mucosal Quality: good  Oral Labial Strength and Mobility: impaired pursing, impaired retraction, impaired seal  Lingual Strength and Mobility: WFL  Volitional Cough: Weak  Volitional Swallow: Delayed  Voice Prior to PO Intake: Dcr intensity; slight dysarthria    Bedside Swallow Eval:   Consistencies Assessed:  Thin liquids -Half a cup of water via straw  Puree -x3 tsp bites of pudding  Solids -x1 bite sized piece of jaime cracker       Oral Phase:   Prolonged mastication  Lingual residue    Pharyngeal Phase:   no overt clinical signs/symptoms of aspiration  no overt clinical signs/symptoms of pharyngeal dysphagia    Compensatory Strategies  None    Treatment: It should be noted that silent aspiration cannot be r/o via b/s assessment. ST discussed recs with the pt's dtr including advancing to a mech soft diet, as well as cont ST to complete possible cog eval pending MRI results. Pt's dtr v/u and was agreeable to all recs.    ST notified pt's nurse and MD regarding diet recs.    Goals:   Multidisciplinary Problems       SLP Goals          Problem: SLP    Goal Priority Disciplines Outcome   SLP Goal    Low SLP Ongoing, Progressing   Description: Goals:  1. Pt will tolerate a mech soft diet with thin liquids w/o any overt s/s of aspiration.   2. Pt will participate in full cognitive-linguistic eval to determine functional deficits pending MRI results.                          Plan:     Patient to be seen:  3 x/week   Plan of Care expires:     Plan of Care reviewed with:  patient, daughter   SLP Follow-Up:  Yes       Discharge recommendations:      Barriers to Discharge:  None    Time Tracking:     SLP Treatment Date:   08/07/23  Speech Start Time:  0929  Speech Stop Time:  0952     Speech Total Time (min):  23 min    Billable Minutes: Treatment Swallowing Dysfunction 10 and Eval Swallow and Oral Function  13    08/07/2023

## 2023-08-07 NOTE — Clinical Note
The site was marked. The right groin, right radial, right brachial and right neck was prepped. The site was prepped with ChloraPrep. The site was clipped. The patient was draped.

## 2023-08-07 NOTE — Clinical Note
The PA catheter was inserted into the superior vena cava. Hemodynamics were performed. O2 saturation was measured at 64%.

## 2023-08-07 NOTE — PLAN OF CARE
[facility] - Telemetry  Initial Discharge Assessment       Primary Care Provider: No, Primary Doctor    Admission Diagnosis: Seizure [R56.9]    Admission Date: 8/7/2023  Expected Discharge Date: 8/9/2023    Consult: neuro, dty, & PT/OT/SLP    Payor: MEDICAID / Plan: [place]UE [place] / Product Type: Managed Medicaid /     Extended Emergency Contact Information  Primary Emergency Contact: DOE VANCE III  Address: [address] [place] [address] [facility]  Home Phone: [phone]  Mobile Phone: [phone]  Relation: Son   needed? No  Secondary Emergency Contact: Cyndi Mercer  Home Phone: [phone]  Mobile Phone: [phone]  Relation: Daughter    Discharge Plan A: (P) Rehab  Discharge Plan B: (P) Skilled Nursing Facility      [facility] [address]  [address] [address]  Phone: [phone] Fax: [phone]      Initial Assessment (most recent)       Adult Discharge Assessment - 08/07/23 1230          Discharge Assessment    Assessment Type Discharge Planning Assessment (P)      Confirmed/corrected address, phone number and insurance Yes (P)      Confirmed Demographics Correct on Facesheet (P)      Source of Information family (P)    Cyndi carias ([phone])    Communicated GEOVANNI with patient/caregiver Date not available/Unable to determine (P)      People in Home child(jb), adult (P)    adult Doe anguiano III & Jamel Vance    Do you expect to return to your current living situation? No (P)      Do you have help at home or someone to help you manage your care at home? Yes (P)      Prior to hospitilization cognitive status: Alert/Oriented (P)      Current cognitive status: Unable to Assess (P)    pt asleep    Equipment Currently Used at Home other (see comments) (P)    BP machine    Readmission within 30 days? No (P)      Patient currently being followed by outpatient  case management? No (P)      Do you currently have service(s) that help you manage your care at home? No (P)      Do you take prescription medications? Yes (P)      Do you have prescription coverage? Yes (P)      Do you have any problems affording any of your prescribed medications? No (P)      Is the patient taking medications as prescribed? yes (P)      How do you get to doctors appointments? family or friend will provide (P)      Are you on dialysis? No (P)      Do you take coumadin? No (P)      Discharge Plan A Rehab (P)      Discharge Plan B Skilled Nursing Facility (P)      DME Needed Upon Discharge  other (see comments) (P)    tbd    Discharge Plan discussed with: Adult children (P)    daughterCyndi ([phone])       Physical Activity    On average, how many days per week do you engage in moderate to strenuous exercise (like a brisk walk)? 0 days (P)      On average, how many minutes do you engage in exercise at this level? 0 min (P)         Financial Resource Strain    How hard is it for you to pay for the very basics like food, housing, medical care, and heating? Hard (P)         Housing Stability    In the last 12 months, was there a time when you were not able to pay the mortgage or rent on time? Yes (P)      In the last 12 months, was there a time when you did not have a steady place to sleep or slept in a shelter (including now)? No (P)         Transportation Needs    In the past 12 months, has lack of transportation kept you from medical appointments or from getting medications? No (P)      In the past 12 months, has lack of transportation kept you from meetings, work, or from getting things needed for daily living? No (P)         Food Insecurity    Within the past 12 months, you worried that your food would run out before you got the money to buy more. Sometimes true (P)      Within the past 12 months, the food you bought just didn't last and you didn't have money to get more. Sometimes  true (P)         Stress    Do you feel stress - tense, restless, nervous, or anxious, or unable to sleep at night because your mind is troubled all the time - these days? Very much (P)         Social Connections    In a typical week, how many times do you talk on the phone with family, friends, or neighbors? More than three times a week (P)      How often do you get together with friends or relatives? More than three times a week (P)      How often do you attend Pentecostal or Jew services? Never (P)      Do you belong to any clubs or organizations such as Pentecostal groups, unions, fraternal or athletic groups, or school groups? Yes (P)      How often do you attend meetings of the clubs or organizations you belong to? Never (P)      Are you , , , , never , or living with a partner?  (P)         Alcohol Use    Q1: How often do you have a drink containing alcohol? Never (P)      Q2: How many drinks containing alcohol do you have on a typical day when you are drinking? Patient does not drink (P)      Q3: How often do you have six or more drinks on one occasion? Never (P)                    1230  Patient alseep in bed with daughter, Cyndi Mercer, at the bedside when CM rounded. Patient was admitted with seizures,is being followed by neuro, dty, & PT/OT/SLP, had an MRI (brain) done this AM, & is scheduled to have an EEG done. Pox 94% on 4L this AM.     Patient lives with his 2 adult sons, Paresh Vance III & Jamel Vance, is independent of all ADLs, & denied the need for assistance with transportation at time of discharge. CM informed Cyndi of PT/OT recs for IRF placement. Cyndi verbalized understanding & agreement but did not have an IRF preference.     CM updated patient's whiteboard with CM name & contact information.     1500  IRF referral sent via [employer]. Awaiting response.     1630  Pt accepted by [facility] pending ins auth.       Will continue to follow.

## 2023-08-07 NOTE — ASSESSMENT & PLAN NOTE
- Needs further counseling on tobacco cessation  - Discuss when awake  - Place nicotine patch when more awake

## 2023-08-07 NOTE — PLAN OF CARE
Problem: Adult Inpatient Plan of Care  Goal: Plan of Care Review  Outcome: Ongoing, Progressing    VN cued into room to complete admit assessment. VIP model introduced; VN working alongside bedside treatment team.  Plan of care reviewed with daughter.  Informed of fall risk, fall precautions, call light within reach, side rails x2 elevated. Notified to ask staff for assistance. Patient verbalized complete understanding. Time allowed for questions. Will continue to monitor and intervene as needed. Chart reviewed.

## 2023-08-07 NOTE — Clinical Note
The catheter was repositioned into the ostium   left main. An angiography was performed of the left coronary arteries. The angiography was performed via hand injection with 10 mL of contrast.

## 2023-08-07 NOTE — HPI
"58 yo male with a PMHx of HTN, HLD, 3v CAD, severe valvular disease (severe MR, mod AR, mod AS) here for AMS and seizure vs possible stroke.    History obtained from son and daughter as patient is altered. Son found his father unable to speak and eventually fell to the ground scrapping his elbow and nose. When he was able to get him up, he was altered and had trouble speaking prompting EMS arrival. Family says he does not drink EtOH or do any drugs except for marijuana. Has not had any significant medical issues, except for a NSTEM earlier this year.    At [facility], the patient was noted to have apparently an expressive aphasia prompting stroke code. CTH negative for ICH but did show a small, likely old lacunar infarct. He then developed a seizure, or "seizure like activity", in the CT scan. He received 3mg IV ativan and loaded with Keppra with resolution of symptoms and was post-ictal. Neurology recommending MRI and neuro consult.  "

## 2023-08-07 NOTE — CONSULTS
Food & Nutrition  Education    Diet Education: Cardiac  Time Spent: N/A  Learners: N/A      Nutrition Education provided with handouts:   Heart Healthy Nutrition Therapy    Comments:  Pt sleeping heavily at time of visit today and being brought out of room to ECG. Spoke with nurse who states daughter has been around - left handout in room for pt's daughter. Will re-attempt to educate pt at RD follow up.    All questions and concerns answered. Dietitian's contact information provided.       Follow-Up: 8/17/2023    Please Re-consult as needed        Thanks!

## 2023-08-07 NOTE — SUBJECTIVE & OBJECTIVE
Past Medical History:   Diagnosis Date    Basal cell carcinoma (BCC) of upper lip 6/20/2021    Coronary artery disease involving native coronary artery without angina pectoris 11/12/2015    Hyperlipidemia 11/12/2015    Hypertension     Syncope and collapse 6/29/2023       Past Surgical History:   Procedure Laterality Date    CORONARY ANGIOGRAPHY  2015    CORONARY ANGIOGRAPHY Right 5/12/2023    Procedure: ANGIOGRAM, CORONARY ARTERY;  Surgeon: Ryan Huertas MD;  Location: [facility] CATH LAB;  Service: Cardiology;  Laterality: Right;    EXCISION OF LESION OF LIP N/A 07/23/2021    Procedure: EXCISION, LESION, LIP;  Surgeon: Cezar Ellis MD;  [facility]: [facility] FLR;  Service: ENT;  Laterality: N/A;    FLAP PROCEDURE N/A 08/12/2021    Procedure: CREATION, FREE FLAP;  Surgeon: Cezar Ellis MD;  Location: [facility] FLR;  Service: ENT;  Laterality: N/A;  Radial forearm    FOOT SURGERY      LIP RECONSTRUCTION Bilateral 08/24/2021    Procedure: RECONSTRUCTION, LIP;  Surgeon: Cezar Ellis MD;  [facility];  Service: ENT;  Laterality: Bilateral;    LIP RECONSTRUCTION N/A 10/08/2021    Procedure: RECONSTRUCTION, LIP;  Surgeon: Cezar Ellis MD;  [facility];  Service: ENT;  Laterality: N/A;    NECK EXPLORATION Left 08/12/2021    Procedure: EXPLORATION, NECK;  Surgeon: Cezar Ellis MD;  [facility];  Service: ENT;  Laterality: Left;    ROTATION FLAP SURGERY  07/23/2021    Procedure: CREATION, FLAP, ROTATION;  Surgeon: Cezar Ellis MD;  [facility];  Service: ENT;;    SKIN SPLIT GRAFT Left 08/12/2021    Procedure: APPLICATION, GRAFT, SKIN, SPLIT-THICKNESS;  Surgeon: Cezar Ellis MD;  Location: [facility];  Service: ENT;  Laterality: Left;       Review of patient's allergies indicates:  No Known Allergies    Current Facility-Administered Medications on File Prior to Encounter   Medication    [COMPLETED] iohexoL  (OMNIPAQUE 350) injection 80 mL    [COMPLETED] levETIRAcetam in NaCl (iso-os) (KEPPRA) 1,000 mg/100 mL IVPB    [COMPLETED] LORazepam injection 1 mg    [COMPLETED] LORazepam injection 2 mg    [DISCONTINUED] levETIRAcetam in NaCl (iso-os) (KEPPRA) 1,000 mg/100 mL IVPB    [DISCONTINUED] levETIRAcetam injection 2,000 mg     Current Outpatient Medications on File Prior to Encounter   Medication Sig    aspirin 81 MG Chew Take 1 tablet (81 mg total) by mouth once daily.    atorvastatin (LIPITOR) 80 MG tablet Take 1 tablet (80 mg total) by mouth once daily.    furosemide (LASIX) 40 MG tablet Take 1 tablet (40 mg total) by mouth once daily.    hydrocortisone 2.5 % cream Apply topically 2 (two) times daily.    losartan (COZAAR) 25 MG tablet Take 1 tablet (25 mg total) by mouth once daily.    EScitalopram oxalate (LEXAPRO) 10 MG tablet Take 1 tablet (10 mg total) by mouth every evening.    hydrOXYzine pamoate (VISTARIL) 25 MG Cap Take 1 capsule (25 mg total) by mouth 4 (four) times daily. (Patient not taking: Reported on 6/12/2023)    nicotine (NICODERM CQ) 21 mg/24 hr Place 1 patch onto the skin once daily. (Patient not taking: Reported on 6/12/2023)     Family History       Problem Relation (Age of Onset)    Heart disease Father    No Known Problems Mother, Sister, Maternal Grandmother, Maternal Grandfather, Paternal Grandmother, Paternal Grandfather, Brother, Maternal Aunt, Maternal Uncle, Paternal Aunt, Paternal Uncle          Tobacco Use    Smoking status: Every Day     Current packs/day: 0.50     Types: Cigarettes    Smokeless tobacco: Never   Substance and Sexual Activity    Alcohol use: Not Currently    Drug use: Yes     Types: Marijuana    Sexual activity: Not on file     Review of Systems   Unable to perform ROS: Mental status change     Objective:     Vital Signs (Most Recent):  Temp: 98.2 °F (36.8 °C) (08/07/23 0312)  Pulse: 104 (08/07/23 0403)  Resp: 16 (08/07/23 0312)  BP: (!) 148/72 (08/07/23 0312)  SpO2: 98 %  (08/07/23 0312) Vital Signs (24h Range):  Temp:  [97.3 °F (36.3 °C)-98.2 °F (36.8 °C)] 98.2 °F (36.8 °C)  Pulse:  [] 104  Resp:  [16-38] 16  SpO2:  [91 %-100 %] 98 %  BP: (119-183)/(58-92) 148/72     Weight: 66 kg (145 lb 8.1 oz)  Body mass index is 18.19 kg/m².     Physical Exam  Vitals and nursing note reviewed.   Constitutional:       Appearance: Normal appearance. He is normal weight. He is not toxic-appearing.   HENT:      Head: Normocephalic and atraumatic.      Mouth/Throat:      Mouth: Mucous membranes are dry.   Eyes:      Extraocular Movements: Extraocular movements intact.      Pupils: Pupils are equal, round, and reactive to light.   Cardiovascular:      Rate and Rhythm: Regular rhythm. Tachycardia present.      Pulses: Normal pulses.      Heart sounds: Murmur heard.   Pulmonary:      Effort: Pulmonary effort is normal.      Breath sounds: Rales present. No wheezing.   Abdominal:      General: Abdomen is flat. Bowel sounds are normal. There is no distension.      Palpations: Abdomen is soft. There is no mass.   Musculoskeletal:         General: No swelling.   Skin:     General: Skin is warm.      Capillary Refill: Capillary refill takes less than 2 seconds.   Neurological:      Mental Status: He is disoriented.      Comments: Moving all 4 extremities spontaneously. Opens eyes spontaneously.              CRANIAL NERVES     CN III, IV, VI   Pupils are equal, round, and reactive to light.       Significant Labs: All pertinent labs within the past 24 hours have been reviewed.    Significant Imaging: I have reviewed all pertinent imaging results/findings within the past 24 hours.

## 2023-08-08 ENCOUNTER — CLINICAL SUPPORT (OUTPATIENT)
Dept: SMOKING CESSATION | Facility: CLINIC | Age: 58
End: 2023-08-08
Payer: COMMERCIAL

## 2023-08-08 DIAGNOSIS — F17.210 CIGARETTE SMOKER: Primary | ICD-10-CM

## 2023-08-08 PROBLEM — F17.200 TOBACCO DEPENDENCY: Status: ACTIVE | Noted: 2023-08-08

## 2023-08-08 PROBLEM — I63.9 ACUTE STROKE DUE TO ISCHEMIA: Status: ACTIVE | Noted: 2023-08-07

## 2023-08-08 LAB
ALBUMIN SERPL BCP-MCNC: 3.4 G/DL (ref 3.5–5.2)
ALP SERPL-CCNC: 72 U/L (ref 55–135)
ALT SERPL W/O P-5'-P-CCNC: 12 U/L (ref 10–44)
ANION GAP SERPL CALC-SCNC: 8 MMOL/L (ref 8–16)
APTT PPP: 31.6 SEC (ref 21–32)
APTT PPP: 31.7 SEC (ref 21–32)
APTT PPP: 37.2 SEC (ref 21–32)
AST SERPL-CCNC: 65 U/L (ref 10–40)
AV INDEX (PROSTH): 0.18
AV MEAN GRADIENT: 40 MMHG
AV PEAK GRADIENT: 58 MMHG
AV REGURGITATION PRESSURE HALF TIME: 255.97 MS
AV VALVE AREA BY VELOCITY RATIO: 0.93 CM²
AV VALVE AREA: 0.55 CM²
AV VELOCITY RATIO: 0.29
BASOPHILS # BLD AUTO: 0.05 K/UL (ref 0–0.2)
BASOPHILS NFR BLD: 0.4 % (ref 0–1.9)
BILIRUB SERPL-MCNC: 2.4 MG/DL (ref 0.1–1)
BSA FOR ECHO PROCEDURE: 1.87 M2
BUN SERPL-MCNC: 10 MG/DL (ref 6–20)
CALCIUM SERPL-MCNC: 8.6 MG/DL (ref 8.7–10.5)
CHLORIDE SERPL-SCNC: 105 MMOL/L (ref 95–110)
CO2 SERPL-SCNC: 20 MMOL/L (ref 23–29)
CREAT SERPL-MCNC: 0.9 MG/DL (ref 0.5–1.4)
CV ECHO LV RWT: 0.45 CM
DIFFERENTIAL METHOD: ABNORMAL
DOP CALC AO PEAK VEL: 3.8 M/S
DOP CALC AO VTI: 110.4 CM
DOP CALC LVOT AREA: 3.1 CM2
DOP CALC LVOT DIAMETER: 2 CM
DOP CALC LVOT PEAK VEL: 1.12 M/S
DOP CALC LVOT STROKE VOLUME: 60.92 CM3
DOP CALC MV VTI: 31.9 CM
DOP CALCLVOT PEAK VEL VTI: 19.4 CM
E WAVE DECELERATION TIME: 179.49 MSEC
E/A RATIO: 0.89
E/E' RATIO: 17.65 M/S
ECHO LV POSTERIOR WALL: 1.14 CM (ref 0.6–1.1)
EJECTION FRACTION: 50 %
EOSINOPHIL # BLD AUTO: 0 K/UL (ref 0–0.5)
EOSINOPHIL NFR BLD: 0.1 % (ref 0–8)
ERYTHROCYTE [DISTWIDTH] IN BLOOD BY AUTOMATED COUNT: 16 % (ref 11.5–14.5)
EST. GFR  (NO RACE VARIABLE): >60 ML/MIN/1.73 M^2
FRACTIONAL SHORTENING: 28 % (ref 28–44)
GLUCOSE SERPL-MCNC: 103 MG/DL (ref 70–110)
HCT VFR BLD AUTO: 36.3 % (ref 40–54)
HGB BLD-MCNC: 11.9 G/DL (ref 14–18)
HR MV ECHO: 96 BPM
IMM GRANULOCYTES # BLD AUTO: 0.05 K/UL (ref 0–0.04)
IMM GRANULOCYTES NFR BLD AUTO: 0.4 % (ref 0–0.5)
INR PPP: 1.2 (ref 0.8–1.2)
INR PPP: 1.2 (ref 0.8–1.2)
INTERVENTRICULAR SEPTUM: 1.32 CM (ref 0.6–1.1)
IVC DIAMETER: 2.06 CM
LA MAJOR: 6.16 CM
LA MINOR: 5.78 CM
LA WIDTH: 4.6 CM
LEFT ATRIUM SIZE: 3.26 CM
LEFT ATRIUM VOLUME INDEX MOD: 45 ML/M2
LEFT ATRIUM VOLUME INDEX: 39.8 ML/M2
LEFT ATRIUM VOLUME MOD: 85.87 CM3
LEFT ATRIUM VOLUME: 76.02 CM3
LEFT INTERNAL DIMENSION IN SYSTOLE: 3.66 CM (ref 2.1–4)
LEFT VENTRICLE DIASTOLIC VOLUME INDEX: 63.4 ML/M2
LEFT VENTRICLE DIASTOLIC VOLUME: 121.09 ML
LEFT VENTRICLE MASS INDEX: 129 G/M2
LEFT VENTRICLE SYSTOLIC VOLUME INDEX: 29.7 ML/M2
LEFT VENTRICLE SYSTOLIC VOLUME: 56.69 ML
LEFT VENTRICULAR INTERNAL DIMENSION IN DIASTOLE: 5.05 CM (ref 3.5–6)
LEFT VENTRICULAR MASS: 245.85 G
LV LATERAL E/E' RATIO: 15 M/S
LV SEPTAL E/E' RATIO: 21.43 M/S
LVOT MG: 2.8 MMHG
LVOT MV: 0.79 CM/S
LYMPHOCYTES # BLD AUTO: 1.5 K/UL (ref 1–4.8)
LYMPHOCYTES NFR BLD: 12.7 % (ref 18–48)
MAGNESIUM SERPL-MCNC: 1.6 MG/DL (ref 1.6–2.6)
MCH RBC QN AUTO: 32.9 PG (ref 27–31)
MCHC RBC AUTO-ENTMCNC: 32.8 G/DL (ref 32–36)
MCV RBC AUTO: 100 FL (ref 82–98)
MONOCYTES # BLD AUTO: 0.6 K/UL (ref 0.3–1)
MONOCYTES NFR BLD: 5.4 % (ref 4–15)
MV MEAN GRADIENT: 8 MMHG
MV PEAK A VEL: 1.68 M/S
MV PEAK E VEL: 1.5 M/S
MV PEAK GRADIENT: 13 MMHG
MV STENOSIS PRESSURE HALF TIME: 52.05 MS
MV VALVE AREA BY CONTINUITY EQUATION: 1.91 CM2
MV VALVE AREA P 1/2 METHOD: 4.23 CM2
NEUTROPHILS # BLD AUTO: 9.7 K/UL (ref 1.8–7.7)
NEUTROPHILS NFR BLD: 81 % (ref 38–73)
NRBC BLD-RTO: 0 /100 WBC
OHS LV EJECTION FRACTION SIMPSONS BIPLANE MOD: 4 %
PHOSPHATE SERPL-MCNC: 2.9 MG/DL (ref 2.7–4.5)
PISA AR MAX VEL: 4.86 M/S
PISA MRMAX VEL: 7.6 M/S
PLATELET # BLD AUTO: 138 K/UL (ref 150–450)
PMV BLD AUTO: 12.7 FL (ref 9.2–12.9)
POTASSIUM SERPL-SCNC: 3.9 MMOL/L (ref 3.5–5.1)
PROT SERPL-MCNC: 5.6 G/DL (ref 6–8.4)
PROTHROMBIN TIME: 13.3 SEC (ref 9–12.5)
PROTHROMBIN TIME: 13.3 SEC (ref 9–12.5)
PULM VEIN S/D RATIO: 0.78
PV MV: 0.73 M/S
PV PEAK D VEL: 0.77 M/S
PV PEAK GRADIENT: 4 MMHG
PV PEAK S VEL: 0.6 M/S
PV PEAK VELOCITY: 1.06 M/S
RA MAJOR: 4.69 CM
RA PRESSURE ESTIMATED: 8 MMHG
RA WIDTH: 2.3 CM
RBC # BLD AUTO: 3.62 M/UL (ref 4.6–6.2)
RIGHT VENTRICULAR END-DIASTOLIC DIMENSION: 2.76 CM
RV TISSUE DOPPLER FREE WALL SYSTOLIC VELOCITY 1 (APICAL 4 CHAMBER VIEW): 11.14 CM/S
SODIUM SERPL-SCNC: 133 MMOL/L (ref 136–145)
TDI LATERAL: 0.1 M/S
TDI SEPTAL: 0.07 M/S
TDI: 0.09 M/S
TRICUSPID ANNULAR PLANE SYSTOLIC EXCURSION: 1.85 CM
TROPONIN I SERPL DL<=0.01 NG/ML-MCNC: 14.97 NG/ML (ref 0–0.03)
TROPONIN I SERPL DL<=0.01 NG/ML-MCNC: 17.32 NG/ML (ref 0–0.03)
TROPONIN I SERPL DL<=0.01 NG/ML-MCNC: 9.86 NG/ML (ref 0–0.03)
WBC # BLD AUTO: 11.92 K/UL (ref 3.9–12.7)
Z-SCORE OF LEFT VENTRICULAR DIMENSION IN END DIASTOLE: -0.62
Z-SCORE OF LEFT VENTRICULAR DIMENSION IN END SYSTOLE: 0.79

## 2023-08-08 PROCEDURE — 25000003 PHARM REV CODE 250: Performed by: INTERNAL MEDICINE

## 2023-08-08 PROCEDURE — 80053 COMPREHEN METABOLIC PANEL: CPT | Performed by: HOSPITALIST

## 2023-08-08 PROCEDURE — 85730 THROMBOPLASTIN TIME PARTIAL: CPT | Mod: 91 | Performed by: HOSPITALIST

## 2023-08-08 PROCEDURE — 99900035 HC TECH TIME PER 15 MIN (STAT)

## 2023-08-08 PROCEDURE — 63600175 PHARM REV CODE 636 W HCPCS: Performed by: INTERNAL MEDICINE

## 2023-08-08 PROCEDURE — 25000003 PHARM REV CODE 250: Performed by: HOSPITALIST

## 2023-08-08 PROCEDURE — 83735 ASSAY OF MAGNESIUM: CPT | Performed by: HOSPITALIST

## 2023-08-08 PROCEDURE — 11000001 HC ACUTE MED/SURG PRIVATE ROOM

## 2023-08-08 PROCEDURE — 99233 SBSQ HOSP IP/OBS HIGH 50: CPT | Mod: ,,, | Performed by: INTERNAL MEDICINE

## 2023-08-08 PROCEDURE — 25500020 PHARM REV CODE 255: Performed by: HOSPITALIST

## 2023-08-08 PROCEDURE — 97116 GAIT TRAINING THERAPY: CPT

## 2023-08-08 PROCEDURE — 97530 THERAPEUTIC ACTIVITIES: CPT

## 2023-08-08 PROCEDURE — 97535 SELF CARE MNGMENT TRAINING: CPT

## 2023-08-08 PROCEDURE — 99223 1ST HOSP IP/OBS HIGH 75: CPT | Mod: ,,, | Performed by: NURSE PRACTITIONER

## 2023-08-08 PROCEDURE — 92523 SPEECH SOUND LANG COMPREHEN: CPT

## 2023-08-08 PROCEDURE — A9585 GADOBUTROL INJECTION: HCPCS | Performed by: HOSPITALIST

## 2023-08-08 PROCEDURE — 63600175 PHARM REV CODE 636 W HCPCS: Performed by: HOSPITALIST

## 2023-08-08 PROCEDURE — 94761 N-INVAS EAR/PLS OXIMETRY MLT: CPT

## 2023-08-08 PROCEDURE — S4991 NICOTINE PATCH NONLEGEND: HCPCS | Performed by: HOSPITALIST

## 2023-08-08 PROCEDURE — 36415 COLL VENOUS BLD VENIPUNCTURE: CPT | Performed by: HOSPITALIST

## 2023-08-08 PROCEDURE — 25000003 PHARM REV CODE 250: Performed by: NURSE PRACTITIONER

## 2023-08-08 PROCEDURE — 85610 PROTHROMBIN TIME: CPT | Mod: 91 | Performed by: HOSPITALIST

## 2023-08-08 PROCEDURE — 99233 PR SUBSEQUENT HOSPITAL CARE,LEVL III: ICD-10-PCS | Mod: ,,, | Performed by: INTERNAL MEDICINE

## 2023-08-08 PROCEDURE — 99407 PR TOBACCO USE CESSATION INTENSIVE >10 MINUTES: ICD-10-PCS | Mod: S$GLB,,,

## 2023-08-08 PROCEDURE — 84100 ASSAY OF PHOSPHORUS: CPT | Performed by: HOSPITALIST

## 2023-08-08 PROCEDURE — 85025 COMPLETE CBC W/AUTO DIFF WBC: CPT | Performed by: HOSPITALIST

## 2023-08-08 PROCEDURE — 84484 ASSAY OF TROPONIN QUANT: CPT | Performed by: HOSPITALIST

## 2023-08-08 PROCEDURE — 99223 PR INITIAL HOSPITAL CARE,LEVL III: ICD-10-PCS | Mod: ,,, | Performed by: NURSE PRACTITIONER

## 2023-08-08 PROCEDURE — 99407 BEHAV CHNG SMOKING > 10 MIN: CPT | Mod: S$GLB,,,

## 2023-08-08 RX ORDER — HEPARIN SODIUM,PORCINE/D5W 25000/250
0-40 INTRAVENOUS SOLUTION INTRAVENOUS CONTINUOUS
Status: DISCONTINUED | OUTPATIENT
Start: 2023-08-08 | End: 2023-08-09

## 2023-08-08 RX ORDER — GADOBUTROL 604.72 MG/ML
6 INJECTION INTRAVENOUS
Status: COMPLETED | OUTPATIENT
Start: 2023-08-08 | End: 2023-08-08

## 2023-08-08 RX ORDER — IBUPROFEN 200 MG
1 TABLET ORAL DAILY
Status: DISCONTINUED | OUTPATIENT
Start: 2023-08-08 | End: 2023-08-10 | Stop reason: HOSPADM

## 2023-08-08 RX ORDER — FUROSEMIDE 10 MG/ML
40 INJECTION INTRAMUSCULAR; INTRAVENOUS ONCE
Status: COMPLETED | OUTPATIENT
Start: 2023-08-08 | End: 2023-08-08

## 2023-08-08 RX ADMIN — HEPARIN SODIUM 12 UNITS/KG/HR: 10000 INJECTION, SOLUTION INTRAVENOUS at 03:08

## 2023-08-08 RX ADMIN — GADOBUTROL 6 ML: 604.72 INJECTION INTRAVENOUS at 09:08

## 2023-08-08 RX ADMIN — ATORVASTATIN CALCIUM 80 MG: 40 TABLET, FILM COATED ORAL at 10:08

## 2023-08-08 RX ADMIN — ESCITALOPRAM OXALATE 10 MG: 10 TABLET ORAL at 09:08

## 2023-08-08 RX ADMIN — ASPIRIN 81 MG: 81 TABLET, COATED ORAL at 10:08

## 2023-08-08 RX ADMIN — SODIUM CHLORIDE 1000 ML: 9 INJECTION, SOLUTION INTRAVENOUS at 01:08

## 2023-08-08 RX ADMIN — LEVETIRACETAM 500 MG: 5 INJECTION INTRAVENOUS at 11:08

## 2023-08-08 RX ADMIN — LEVETIRACETAM 500 MG: 5 INJECTION INTRAVENOUS at 10:08

## 2023-08-08 RX ADMIN — NICOTINE 1 PATCH: 14 PATCH, EXTENDED RELEASE TRANSDERMAL at 02:08

## 2023-08-08 RX ADMIN — FUROSEMIDE 40 MG: 10 INJECTION, SOLUTION INTRAMUSCULAR; INTRAVENOUS at 04:08

## 2023-08-08 RX ADMIN — CLOPIDOGREL BISULFATE 75 MG: 75 TABLET ORAL at 10:08

## 2023-08-08 NOTE — ASSESSMENT & PLAN NOTE
-troponin peak 17.3, now down trending   -discussed with Cardiology and Neurology given complexity of patient   -continue DAPT, statin, IV heparin drip for 48 hours   -will need repeat angiogram to better assess coronaries in preparation for possible CABG, SAVR, and SMVR evaluation  -currently chest pain-free

## 2023-08-08 NOTE — ASSESSMENT & PLAN NOTE
- significant left SC artery stenosis noted CTA H/N  - will do BPs on right only and monitor for now

## 2023-08-08 NOTE — ASSESSMENT & PLAN NOTE
- SBP 90s-110s overnight  - on Losartan as an outpatient- on hold due to need for permissive HTN  - goal BP less than 130/80; BP well controlled  - resume ARB and BB once cleared by Neurology

## 2023-08-08 NOTE — CONSULTS
"[facility] - Tele[facility]  Cardiology  Consult Note    Patient Name: Paresh Vance Jr.  MRN: [id number]  Admission Date: 8/7/2023  Hospital Length of Stay: 1 days  Code Status: Full Code   Attending Provider: Abhinav Garcia,*   Consulting Provider: ANUP Costello ANP  Primary Care Physician: Gloria Shahid MD  Principal Problem:Seizure    Patient information was obtained from patient, past medical records and ER records.     Inpatient consult to [facility]  Consult performed by: Delilah Hsu APRN, DASIA  Consult ordered by: Abhinav Garcia MD  Reason for consult: NSTEMI         Subjective:     Chief Complaint:  AMS      HPI:   56yo male with tobacco abuse, HTN, CAD, left SC artery stenosis, seizure, aphasia, NSTEMI, MR, AS, AI who presented to the ER with complaints of AMS. He was seen this morning on AM rounds while sitting up in bed eating breakfast and was completely oriented. He does not recall the events from yesterday therefore his presentation was obtained from HPI. His son and daughter noted him to be altered with inability to speak and eventually fell to the ground and he was unable to get up. EMS was called and he was brought to [facility] with notation of expressive aphasia with code stroke called. CTA head and neck with no ICH and notation of old lacunar infarct then developed "seizure like activity" while undergoing CT scan and was given IV Ativan and Keppra with improvement and then notation of post ictal. Transferred to [facility] for Neurology evaluation. Labs CBC WNL. BMP WNL Tropnin .[phone]  EKG SR PVCs LVH. Admitted to [facility] Medicine and Cardiology consulted for evaluation of NSTEMI    Of note, patient tells me he underwent LHC in May 2023 and recalls being told that he has blockages in 3 arteries and issues with 3 of his valves. He tells me evaluation of open heart surgery was discussed but he did not see CTS due to family issues/stressors "     Hospital Course: 8/8/2023 per HPI    Past Medical History:   Diagnosis Date    Basal cell carcinoma (BCC) of upper lip 6/20/2021    Coronary artery disease involving native coronary artery without angina pectoris 11/12/2015    Hyperlipidemia 11/12/2015    Hypertension     Syncope and collapse 6/29/2023       Past Surgical History:   Procedure Laterality Date    CORONARY ANGIOGRAPHY  2015    CORONARY ANGIOGRAPHY Right 5/12/2023    Procedure: ANGIOGRAM, CORONARY ARTERY;  Surgeon: Ryan Huertas MD;  Location: [facility] CATH LAB;  Service: Cardiology;  Laterality: Right;    EXCISION OF LESION OF LIP N/A 07/23/2021    Procedure: EXCISION, LESION, LIP;  Surgeon: Cezar Ellis MD;  Location: [facility] OR [facility] FLR;  Service: ENT;  Laterality: N/A;    FLAP PROCEDURE N/A 08/12/2021    Procedure: CREATION, FREE FLAP;  Surgeon: Cezar Ellsi MD;  Location: [facility] OR [facility] FLR;  Service: ENT;  Laterality: N/A;  Radial forearm    FOOT SURGERY      LIP RECONSTRUCTION Bilateral 08/24/2021    Procedure: RECONSTRUCTION, LIP;  Surgeon: Cezar Ellis MD;  [facility];  Service: ENT;  Laterality: Bilateral;    LIP RECONSTRUCTION N/A 10/08/2021    Procedure: RECONSTRUCTION, LIP;  Surgeon: Cezar Ellis MD;  Location: [facility] OR [facility] FLR;  Service: ENT;  Laterality: N/A;    NECK EXPLORATION Left 08/12/2021    Procedure: EXPLORATION, NECK;  Surgeon: Cezar Ellis MD;  [facility];  Service: ENT;  Laterality: Left;    ROTATION FLAP SURGERY  07/23/2021    Procedure: CREATION, FLAP, ROTATION;  Surgeon: Cezar Ellis MD;  [facility];  Service: ENT;;    SKIN SPLIT GRAFT Left 08/12/2021    Procedure: APPLICATION, GRAFT, SKIN, SPLIT-THICKNESS;  Surgeon: Cezar Ellis MD;  [facility];  Service: ENT;  Laterality: Left;       Review of patient's allergies indicates:  No Known Allergies    No current facility-administered medications on file  prior to encounter.     Current Outpatient Medications on File Prior to Encounter   Medication Sig    aspirin 81 MG Chew Take 1 tablet (81 mg total) by mouth once daily.    atorvastatin (LIPITOR) 80 MG tablet Take 1 tablet (80 mg total) by mouth once daily.    furosemide (LASIX) 40 MG tablet Take 1 tablet (40 mg total) by mouth once daily.    hydrocortisone 2.5 % cream Apply topically 2 (two) times daily.    losartan (COZAAR) 25 MG tablet Take 1 tablet (25 mg total) by mouth once daily.    EScitalopram oxalate (LEXAPRO) 10 MG tablet Take 1 tablet (10 mg total) by mouth every evening.    hydrOXYzine pamoate (VISTARIL) 25 MG Cap Take 1 capsule (25 mg total) by mouth 4 (four) times daily. (Patient not taking: Reported on 6/12/2023)    nicotine (NICODERM CQ) 21 mg/24 hr Place 1 patch onto the skin once daily. (Patient not taking: Reported on 6/12/2023)     Family History       Problem Relation (Age of Onset)    Heart disease Father    No Known Problems Mother, Sister, Maternal Grandmother, Maternal Grandfather, Paternal Grandmother, Paternal Grandfather, Brother, Maternal Aunt, Maternal Uncle, Paternal Aunt, Paternal Uncle          Tobacco Use    Smoking status: Every Day     Current packs/day: 0.50     Types: Cigarettes    Smokeless tobacco: Never   Substance and Sexual Activity    Alcohol use: Not Currently    Drug use: Yes     Types: Marijuana    Sexual activity: Not on file     Review of Systems   Constitutional: Negative for chills, decreased appetite, diaphoresis and fever.   Cardiovascular:  Negative for chest pain, claudication, cyanosis, dyspnea on exertion, irregular heartbeat, leg swelling, near-syncope, orthopnea, palpitations, paroxysmal nocturnal dyspnea and syncope.   Respiratory:  Negative for cough, hemoptysis, shortness of breath and wheezing.    Gastrointestinal:  Negative for bloating, abdominal pain, constipation, diarrhea, melena, nausea and vomiting.   Neurological:  Negative for  dizziness and weakness.     Objective:     Vital Signs (Most Recent):  Temp: 99.3 °F (37.4 °C) (08/08/23 0812)  Pulse: 76 (08/08/23 0812)  Resp: 19 (08/08/23 0812)  BP: (!) 100/55 (08/08/23 0812)  SpO2: 99 % (08/08/23 0812) Vital Signs (24h Range):  Temp:  [97.5 °F (36.4 °C)-100.7 °F (38.2 °C)] 99.3 °F (37.4 °C)  Pulse:  [76-96] 76  Resp:  [16-19] 19  SpO2:  [95 %-99 %] 99 %  BP: ()/(50-58) 100/55     Weight: 65.8 kg (145 lb)  Body mass index is 18.12 kg/m².    SpO2: 99 %         Intake/Output Summary (Last 24 hours) at 8/8/2023 0901  Last data filed at 8/8/2023 0500  Gross per 24 hour   Intake 240 ml   Output 2500 ml   Net -2260 ml       Lines/Drains/Airways       Peripheral Intravenous Line  Duration                  Peripheral IV - Single Lumen 08/06/23 2000 18 G Anterior;Left;Proximal Forearm 1 day         Peripheral IV - Single Lumen 08/07/23 0328 18 G Anterior;Right Forearm 1 day                     Physical Exam  Constitutional:       General: He is not in acute distress.     Appearance: He is well-developed.   Cardiovascular:      Rate and Rhythm: Normal rate and regular rhythm.      Heart sounds: Murmur heard.      No gallop.   Pulmonary:      Effort: Pulmonary effort is normal. No respiratory distress.      Breath sounds: Normal breath sounds. No wheezing.   Abdominal:      General: Bowel sounds are normal. There is no distension.      Palpations: Abdomen is soft.      Tenderness: There is no abdominal tenderness.   Skin:     General: Skin is warm and dry.   Neurological:      Mental Status: He is alert and oriented to person, place, and time.          Significant Labs: BMP:   Recent Labs   Lab 08/06/23 2228 08/08/23  0318    103   * 133*   K 3.7 3.9    105   CO2 22* 20*   BUN 7 10   CREATININE 0.9 0.9   CALCIUM 9.0 8.6*   MG 1.7 1.6    and CBC   Recent Labs   Lab 08/06/23 2228 08/08/23  0318   WBC 7.76 11.92   HGB 13.6* 11.9*   HCT 41.6 36.3*    138*       Significant  Imaging: Echocardiogram: Transthoracic echo (TTE) complete ([facility] Only):   Results for orders placed or performed during the hospital encounter of 08/07/23   Echo   Result Value Ref Range    BSA 1.87 m2    Beach's Biplane MOD Ejection Fraction 4 %    LA WIDTH 4.6 cm    TAPSE 1.85 cm    RA Width 2.3 cm    Est. RA pres 8 mmHg    LVOT stroke volume 57.32 cm3    LVIDd 5.05 3.5 - 6.0 cm    LV Systolic Volume 56.69 mL    LV Systolic Volume Index 29.7 mL/m2    LVIDs 3.66 2.1 - 4.0 cm    LV Diastolic Volume 121.09 mL    LV Diastolic Volume Index 63.40 mL/m2    IVS 1.32 (A) 0.6 - 1.1 cm    LVOT diameter 1.94 cm    LVOT area 3.0 cm2    FS 28 28 - 44 %    Left Ventricle Relative Wall Thickness 0.45 cm    Posterior Wall 1.14 (A) 0.6 - 1.1 cm    LV mass 245.85 g    LV Mass Index 129 g/m2    MV Peak E Douglas 1.50 m/s    TDI LATERAL 0.10 m/s    TDI SEPTAL 0.07 m/s    E/E' ratio 17.65 m/s    MV Peak A Douglas 1.68 m/s    E/A ratio 0.89     E wave deceleration time 179.49 msec    LV SEPTAL E/E' RATIO 21.43 m/s    LA Volume Index 39.8 mL/m2    LV LATERAL E/E' RATIO 15.00 m/s    LA volume 76.02 cm3    PV Peak S Douglas 0.60 m/s    PV Peak D Douglas 0.77 m/s    Pulm vein S/D ratio 0.78     LVOT peak douglas 1.12 m/s    Left Ventricular Outflow Tract Mean Velocity 0.79 cm/s    Left Ventricular Outflow Tract Mean Gradient 2.80 mmHg    LA volume (mod) 85.87 cm3    LA Volume Index (Mod) 45.0 mL/m2    LA size 3.26 cm    Left Atrium Major Axis 6.16 cm    Left Atrium Minor Axis 5.78 cm    RV S' 11.14 cm/s    RA Major Axis 4.69 cm    AV regurgitation pressure 1/2 time 255.098921747230349 ms    AR Max Douglas 4.86 m/s    AV mean gradient 68 mmHg    AV peak gradient 82 mmHg    Ao peak douglas 4.52 m/s    Ao .40 cm    LVOT peak VTI 19.40 cm    AV valve area 0.52 cm²    AV Velocity Ratio 0.25     AV index (prosthetic) 0.18     RISA by Velocity Ratio 0.73 cm²    Mr max douglas 7.60 m/s    MV mean gradient 8 mmHg    MV peak gradient 13 mmHg    MV stenosis pressure 1/2  time 52.05 ms    MV valve area p 1/2 method 4.23 cm2    MV valve area by continuity eq 1.80 cm2    MV VTI 31.9 cm    PV PEAK VELOCITY 1.06 m/s    PV peak gradient 4 mmHg    Pulmonary Valve Mean Velocity 0.73 m/s    IVC diameter 2.06 cm    Mean e' 0.09 m/s    ZLVIDS 0.79     ZLVIDD -0.62     RVDD 2.76 cm     Assessment and Plan:     Valvular heart disease with severe MR, mod AR and AS  - last echo with EF 50-55% with severe MR and moderate to severe AI/AS; RISA .48cm2 peak velocity 4.0m/sec and mean gradient of 35mmHg   - [facility] in 5/2023 with MV CAD- seen by Cardiology at [facility] with recommendation for CTS evaluation but lost to follow up due to family issues/stressors  - repeat echo today    Aphasia  - reports of aphasia from family and noted at [facility]  - CTA head and neck done with mild left CCA disease and no right CCA or ICA disease  - MRI pending; Neurology consulted  - appears aphasia resolved; patient reports at baseline     Subclavian artery stenosis  - significant left SC artery stenosis noted CTA H/N  - will do BPs on right only and monitor for now    Essential hypertension  - SBP 90s-110s overnight  - on Losartan as an outpatient- on hold due to need for permissive HTN  - goal BP less than 130/80; BP well controlled  - resume ARB and BB once cleared by Neurology     Coronary artery disease involving native coronary artery without angina pectoris  - denies any chest pain or SOB prior to admission but very sedentary life style  - [facility] 5/2023 with OM2 70-80% mLAD 50-60% p-m RCA  with collateral from L  - initial troponin .029 with trend up to 17.321; EKG with NSR PVCs LVH unchanged from previous  - last echo in 5/2023 with normal LVEF and MR, AS and AI; repeat echo pending  - currently on ASA, statin and Plavix; no IV Heparin or SQ Lovenox given presentation concerning for CVA  - continue medical management for NSTEMI and trend troponin until peak occurs; await evaluation by Neurology in regards to AMS/CVA;  angiogram films to be reviewed by staff        VTE Risk Mitigation (From admission, onward)         Ordered     enoxaparin injection 40 mg  Every 24 hours         08/07/23 0252     IP VTE HIGH RISK PATIENT  Once         08/07/23 0252     Place sequential compression device  Until discontinued         08/07/23 0252                Thank you for your consult. I will follow-up with patient. Please contact us if you have any additional questions.    ANUP Costello, ANP  Cardiology   [facility] - Telemetry

## 2023-08-08 NOTE — NURSING
At this time informed physician on-call, Lilliana Boyce NP that pt blood pressure has continued to drop and is now 83/50 however temperature is now normal at 97.5; map is 61; physician on-call orders 1L normal saline bolus IV; bolus administered at this time; pt asymptomatic; pt denies lightheadedness or distress; pt alert and oriented X 3; nursing continuing to monitor pt this shift; call light in reach; pt BP rises to 110/55 post bolus administration.

## 2023-08-08 NOTE — PT/OT/SLP EVAL
Speech Language Pathology Evaluation  Cognitive Communication    Patient Name:  Paresh Vance Jr.   MRN:  [id number]  Admitting Diagnosis: Seizure    Recommendations:     Recommendations:                General Recommendations:  high intensity level of therapy at DC   Diet recommendations:  Dental soft, Minced & Moist Diet - IDDSI Level 6, Thin   Aspiration Precautions: upright for meals, slow rate, alternate sips and bites, whole meds, can feed self    General Precautions: Standard, fall ([place])  Communication strategies:  [place]    Assessment:     Paresh Vance Jr. is a 57 y.o. male admitted with seizure t/f from [facility] with an SLP diagnosis of baseline swallowing function and cognition is at baseline level per dtr.      History per Neuro Note       Reason for consult:  seizure with concern for stroke     CC:  seizure with concern for stroke     HPI:   Paresh Vance Jr. is a 57 y.o. w/ Hx of HTN, HLD, 3 vessel CAD, valvular disease, and tobacco use who presented as transfer from [place] with AMS and reported expressive aphasia with witnessed seizure like activity at OSH, transferred for MRI and neuro consult. Patient was found by his son unable to speak and subsequent fall to the ground, with no reported seizure activity at that time. Patient's son was unable to get his father up, for which EMS was called. He was noted to be unable to get up and had trouble speaking. Patient denies EtOH and illicit drug use other than for marijuana. He denies prior hx of stroke or seizures. On arrival to [place], patient was noted to have reported expressive aphasia, prompting code stroke. CTH/CTA unremarkable for acute intracranial processes or intracranial flow-limiting stenosis/ LVO. While in the scanner at the OSH, patient with observed seizure like activity for which he was given 3 mg ativan x 1 and keppra 1 g. He is currently on keppra 500 mg BID. Patient remains post-ictal today, but is able to answer questions  appropriately. He denies hx of seizure and he denies speech disturbance, focal weakness/sensory changes, vision changes, headache, nausea, and vertigo. He denies EtOH use. He endorses marijuana use but denies other illicit drugs.        Past Medical History:   Diagnosis Date    Basal cell carcinoma (BCC) of upper lip 6/20/2021    Coronary artery disease involving native coronary artery without angina pectoris 11/12/2015    Hyperlipidemia 11/12/2015    Hypertension     Syncope and collapse 6/29/2023       Past Surgical History:   Procedure Laterality Date    CORONARY ANGIOGRAPHY  2015    CORONARY ANGIOGRAPHY Right 5/12/2023    Procedure: ANGIOGRAM, CORONARY ARTERY;  Surgeon: Ryan Huertas MD;  Location: [facility] CATH LAB;  Service: Cardiology;  Laterality: Right;    EXCISION OF LESION OF LIP N/A 07/23/2021    Procedure: EXCISION, LESION, LIP;  Surgeon: Cezar Ellis MD;  [facility];  Service: ENT;  Laterality: N/A;    FLAP PROCEDURE N/A 08/12/2021    Procedure: CREATION, FREE FLAP;  Surgeon: Cezar Ellis MD;  [facility];  Service: ENT;  Laterality: N/A;  Radial forearm    FOOT SURGERY      LIP RECONSTRUCTION Bilateral 08/24/2021    Procedure: RECONSTRUCTION, LIP;  Surgeon: Cezar Ellis MD;  [facility];  Service: ENT;  Laterality: Bilateral;    LIP RECONSTRUCTION N/A 10/08/2021    Procedure: RECONSTRUCTION, LIP;  Surgeon: Cezar Ellis MD;  [facility];  Service: ENT;  Laterality: N/A;    NECK EXPLORATION Left 08/12/2021    Procedure: EXPLORATION, NECK;  Surgeon: Cezar Ellis MD;  [facility];  Service: ENT;  Laterality: Left;    ROTATION FLAP SURGERY  07/23/2021    Procedure: CREATION, FLAP, ROTATION;  Surgeon: Cezar Ellis MD;  [facility];  Service: ENT;;    SKIN SPLIT GRAFT Left 08/12/2021    Procedure: APPLICATION, GRAFT, SKIN, SPLIT-THICKNESS;  Surgeon: Cezar Ellis MD;   "Location: [facility] OR [address] [place] [address];  Service: ENT;  Laterality: Left;       Social History: Patient lives in [place], youngest son resides with him. Pt has 4 adult children, oldest dtr is Cyndi who lives about 5 miles away from him.     Prior Intubation HX:  n/a    Modified Barium Swallow: n/a      MRI: Mild microvascular small vessel ischemic change.  1 of these punctate areas of microvascular small vessel ischemic change is acute based on diffusion-weighted imaging.   Prior diet: reg/thin.    Occupation/hobbies/homemaking: no longer employed .      Subjective     Pt seen in room for follow up speech/lang eval pending MRI recs.  Patient goals: "I stopped smoking stuff a while ago bc I know its not good."     Pain/Comfort:  Pain Rating 1: 0/10    Respiratory Status: room air     Objective:     Cognitive Status:    [place] to place, year, , address and family in room.  Pt able to recall and see information on whiteboard  Pt responded to household safety ? and sequencing tasks with min cues.       Receptive Language:   Comprehension:   WFL for commands and responding to ?s     Pragmatics:    Flat affect     Expressive Language:  Verbal:    Fluent verbal expression, named items in room, named his children, able to have conversation with MD in in room and SLP. Pt able to sustain attention, turn take and respond to questions with appropriate response.         Motor Speech:  Baseline oral motor weakness due to R facial weakness form oral surgery    Voice:   Raspy voice due to tobacco use    Visual-Spatial:  None noted, wears reading glasses    Reading:   Able to read month, year and location from whiteboard     Written Expression:   DNT    Treatment: Educated dtr and pt on role of SLP, diet recs, strategies to organize appts and daily events. Dtr reported that pt is at baseline and has difficulty performing activities or answering complex ?s. All questions answered within SLP scope of practice. SLP to sign off. "     Goals:   Multidisciplinary Problems       SLP Goals       Not on file              Multidisciplinary Problems (Resolved)          Problem: SLP    Goal Priority Disciplines Outcome   SLP Goal   (Resolved)    Low SLP Met   Description: Goals:  1. Pt will tolerate a mech soft diet with thin liquids w/o any overt s/s of aspiration.   2. Pt will participate in full cognitive-linguistic eval to determine functional deficits pending MRI results.                          Plan:     Plan of Care expires:     Plan of Care reviewed with:  patient, daughter   SLP Follow-Up:  No       Discharge recommendations:  Discharge Facility/Level of Care Needs:  (high intensity level of therapy)   Barriers to Discharge:  none    Time Tracking:     SLP Treatment Date:   08/08/23  Speech Start Time:  1103  Speech Stop Time:  1123     Speech Total Time (min):  20 min    Billable Minutes: Eval 11  and Self Care/Home Management Training 9    08/08/2023

## 2023-08-08 NOTE — PROGRESS NOTES
Progress Note  Neurology    Admit Date: 8/7/2023  LOS: 1    CC: seizure    SUBJECTIVE:     Interval History/Significant Events: AF, episodes of hypotension overnight. Troponin progressively increase from admission, now down trending. Cardiology is following. Aphasia resolved today and patient reports he is at his neurologic baseline. No reported events or arrhythmias on telemetry overnight.    Review of Symptoms:   Negative unless stated otherwise in interval Hx.    Medications:    Scheduled Meds:   aspirin  81 mg Oral Daily    atorvastatin  80 mg Oral Daily    clopidogreL  75 mg Oral Daily    enoxparin  40 mg Subcutaneous Q24H (prophylaxis, 1700)    EScitalopram oxalate  10 mg Oral QHS    levETIRAcetam (Keppra) IV (PEDS and ADULTS)  500 mg Intravenous Q12H     PRN Meds:.sodium chloride 0.9%, labetalol, ondansetron, ondansetron, sodium chloride 0.9%, sodium chloride 0.9%, sodium chloride 0.9%    OBJECTIVE:     Physical Exam:    Vital Signs (24h Range):   Temp:  [97.5 °F (36.4 °C)-100.7 °F (38.2 °C)] 99.3 °F (37.4 °C)  Pulse:  [76-96] 77  Resp:  [16-19] 19  SpO2:  [95 %-99 %] 98 %  BP: ()/(50-58) 100/55      Neuro:  Mental Status: Alert, oriented, thought content appropriate    Language:  no aphasia or dysarthria noted    CN:PERRL, VF intact, EOMI, V1-V3 intact, symmetric facial expression, hearing grossly intact, SCM & TPZ 5/5, tongue midline, symmetric palate elevation.    Motor:   Normal Bulk  Normal Tone  5/5 strength in 4 extremities    Sensory: intact to light touch, temperature and pin-prick    DTR:  +2 symmetric in bilateral Upper extremities, 1+ in bilateral patellar and ankle, plantar response flexor bilaterally    Cerebellar: normal finger-to-nose and normal heel-to-shin test    Gait: deferred to therapy    Labs:  I have personally reviewed patient's labs for this admission.    Hgb A1c 4.9  LDL  56    Imaging:  I have reviewed neuroimaging and neurodiagnostic studies for this encounter.     MRI B w  wo contrast: chronic microvascular ischemic changes, punctate area of diffusion restriction to R parietal parasagittal subcortical white matter    EEG: mild intermittent slowing seen bilaterally consistent with subcortical/deep midline dysfunction. There are no epileptiform discharges and no electrographic seizures.     CTA head and neck: no intracranial LVO or flow-limiting stenosis. Significant stenosis of L subclavian distal to origin of L vert, with L vert filling, R vert dominant. <50% stenosis of L CCA and L carotid bifurcation    TTE w bubble: LVEF ~50%, LV regional WMA, mild L atrium dilation, severe aortic stenosis, findings consistent with rheumatic disease of mitral valve with mild stenosis, bubble study negative for R to L shunt       ASSESSMENT/PLAN:     58 yo RH M with hx significant for HTN, CAD, L SC artery stenosis, NSTEMI, multi-valvular disease (AS, MR, AI), and tobacco use who presented to the ED with AMS, with witnessed first time seizure and subsequent transient speech disturbance. Patient is now back to his neurologic baseline. Patient with EEG showing bilateral slowing. MRI B w wo contrast shows small, punctate acute stroke in R parietal, parasagittal, subcortical white matter.     It is unlikely that this punctate stroke in the patient's non-dominant lobe caused his speech disturbance. Rather it is more likely this stroke caused a seizure, resulting in his transient speech disturbance. Given patient's risk factors and work up, suspect this stroke is atheroembolic. Given seizure from stroke/structural cause, will continue keppra and have patient follow up with neurology outpatient.     NIHSS 0  CVRFs: HTN, CAD x 3 vessel, valvular disease, tobacco use  Stroke etiology: atheroembolic     Recommendations:  - q 4 hr neurochecks  - recommend ASA 81 mg daily and Plavix 75 mg daily x 21 days followed by plavix monotherapy from stroke standpoint; will defer DAPT duration to cardiology given possible  need for revascularization   - if anticoagulation is started for cardiac reasons, okay with stopping ASA from stroke standpoint, will defer need for anticoagulation or triple therapy to cardiology; given small, punctate size of patient's acute stroke, there is low risk of hemorrhagic transformation of this stroke if anticoagulation is initiated   - continue lipitor, titrate to LDL <70 per PCP  - continue keppra 500 mg BID; if patient goes for cardiac procedure and is unable to swallow or is sedated, give keppra IV  - okay for normotension, SBP goal <160, avoid hypotension   - telemetry   - cardiology following  - normoglycemia, management per primary team; glucose goal 100-180  - PT/OT/SLP   - follow up with neurology outpatient  - counseled patient on tobacco cessation, this is vital for patient to prevent worsening and further stroke/CV disease, provide patient with tobacco cessation resources, if needed, nicotine patch while inpatient  - counseled patient on stroke risk factors and secondary stroke prevention. Counseled on signs and symptoms of acute stroke and to call 911 should he experience any of these.  - counseled patient on seizure precautions, including taking anti-seizure medications as prescribed and state driving laws (reports he does not drive)      Will discuss and staff with Dr. Maciel.      Will sign off at this time. Please call for any questions regarding this patient's neurologic care or for any neurologic decline.     Fabrizio Castellon MD   Neurology, PGY IV

## 2023-08-08 NOTE — ASSESSMENT & PLAN NOTE
- NSTEMI earlier in the year with LHC showing severe 3v dz. CTS denied him for surgery  - Continue DAPT, statin  - Telemetry  - Holding anti-HTN due to possible CVA  -see NSTEMI

## 2023-08-08 NOTE — ASSESSMENT & PLAN NOTE
- Concern for acute CVA as he had reported expressive aphasia  - MRI brain with small punctate lesion, at low risk for hemorrhagic conversion  - A1c and lipids noted  - ASA, plavix, lipitor 80mg  - Neurology consult  - Permissive HTN with SBP < 220 unless HTN worsens MR or AR  - Stroke pathway  - PT/OT/SLP

## 2023-08-08 NOTE — PROGRESS NOTES
"[facility] Medicine  Progress Note    Patient Name: Paresh Vance Jr.  MRN: [id number]  Patient Class: IP- Inpatient   Admission Date: 8/7/2023  Length of Stay: 1 days  Attending Physician: Abhinav Garcia,*  Primary Care Provider: Gloria Shahid MD        Subjective:     Principal Problem:NSTEMI (non-ST elevated myocardial infarction)        HPI:  56 yo male with a PMHx of HTN, HLD, 3v CAD, severe valvular disease (severe MR, mod AR, mod AS) here for AMS and seizure vs possible stroke.    History obtained from son and daughter as patient is altered. Son found his father unable to speak and eventually fell to the ground scrapping his elbow and nose. When he was able to get him up, he was altered and had trouble speaking prompting EMS arrival. Family says he does not drink EtOH or do any drugs except for marijuana. Has not had any significant medical issues, except for a NSTEM earlier this year.    At [facility], the patient was noted to have apparently an expressive aphasia prompting stroke code. CTH negative for ICH but did show a small, likely old lacunar infarct. He then developed a seizure, or "seizure like activity", in the CT scan. He received 3mg IV ativan and loaded with Keppra with resolution of symptoms and was post-ictal. Neurology recommending MRI and neuro consult.      Overview/Hospital Course:  No notes on file    Interval History:  Much more alert today.  Denies any chest pain or shortness of breath at this time.  Discussed with Neurology and Cardiology; does appear that he had a small stroke likely secondary to atheroemboli.  Okay for anticoagulation in the setting of NSTEMI.    Review of Systems   Respiratory:  Negative for shortness of breath.    Cardiovascular:  Negative for chest pain.     Objective:     Vital Signs (Most Recent):  Temp: 97.6 °F (36.4 °C) (08/08/23 1201)  Pulse: 82 (08/08/23 1214)  Resp: 19 (08/08/23 1201)  BP: (!) 108/55 (08/08/23 1201)  SpO2: 99 % " (08/08/23 1201) Vital Signs (24h Range):  Temp:  [97.5 °F (36.4 °C)-100.7 °F (38.2 °C)] 97.6 °F (36.4 °C)  Pulse:  [76-92] 82  Resp:  [16-19] 19  SpO2:  [95 %-99 %] 99 %  BP: ()/(50-58) 108/55     Weight: 65.8 kg (145 lb)  Body mass index is 18.12 kg/m².    Intake/Output Summary (Last 24 hours) at 8/8/2023 1437  Last data filed at 8/8/2023 0500  Gross per 24 hour   Intake 240 ml   Output 1000 ml   Net -760 ml         Physical Exam  Vitals and nursing note reviewed.   Constitutional:       Appearance: Normal appearance. He is normal weight. He is not toxic-appearing.   HENT:      Head: Normocephalic and atraumatic.      Mouth/Throat:      Mouth: Mucous membranes are dry.   Eyes:      Extraocular Movements: Extraocular movements intact.      Pupils: Pupils are equal, round, and reactive to light.   Cardiovascular:      Rate and Rhythm: Normal rate and regular rhythm.      Pulses: Normal pulses.      Heart sounds: Murmur heard.   Pulmonary:      Effort: Pulmonary effort is normal.      Breath sounds: No wheezing.   Abdominal:      General: Abdomen is flat. Bowel sounds are normal. There is no distension.      Palpations: Abdomen is soft. There is no mass.   Musculoskeletal:         General: No swelling.   Skin:     General: Skin is warm.      Capillary Refill: Capillary refill takes less than 2 seconds.   Neurological:      Mental Status: He is oriented to person, place, and time.   Psychiatric:         Behavior: Behavior normal.             Significant Labs: All pertinent labs within the past 24 hours have been reviewed.    Significant Imaging: I have reviewed all pertinent imaging results/findings within the past 24 hours.      Assessment/Plan:      * NSTEMI (non-ST elevated myocardial infarction)  -troponin peak 17.3, now down trending   -discussed with Cardiology and Neurology given complexity of patient   -continue DAPT, statin, IV heparin drip for 48 hours   -will need repeat angiogram to better assess  coronaries in preparation for possible CABG, SAVR, and SMVR evaluation  -currently chest pain-free      Valvular heart disease with severe MR, mod AR and AS  - Per CTS notes, not a candidate for CABG or valve replacement  - Considering TAVR first with revascularization then potential LAUREANO  - Stable hemodynamically  - Monitor with mild diuresis as needed      Acute stroke due to ischemia  - Concern for acute CVA as he had reported expressive aphasia  - MRI brain with small punctate lesion, at low risk for hemorrhagic conversion  - A1c and lipids noted  - ASA, plavix, lipitor 80mg  - Neurology consult  - Permissive HTN with SBP < 220 unless HTN worsens MR or AR  - Stroke pathway  - PT/OT/SLP      Seizure  - Remains altered but improving. Could be post ictal + ativan use  - S/p Keppra load  - Continue IV Keppra 500mg q12h  - neurology consulted  - EEG not consistent with seizure      Subclavian artery stenosis  -significant left subclavian artery stenosis noted on CTA  -blood pressure on the right      Essential hypertension  - Holding anti-HTN for now      Coronary artery disease involving native coronary artery without angina pectoris  - NSTEMI earlier in the year with LHC showing severe 3v dz. CTS denied him for surgery  - Continue DAPT, statin  - Telemetry  - Holding anti-HTN due to possible CVA  -see NSTEMI    Tobacco abuse  Dangers of cigarette smoking were reviewed with patient in detail for 5 minutes and patient was encouraged to quit. Nicotine replacement options were discussed with the patient and they decided to use nicotine patch.         VTE Risk Mitigation (From admission, onward)         Ordered     heparin 25,000 units in dextrose 5% (100 units/ml) IV bolus from bag - ADDITIONAL PRN BOLUS - 60 units/kg (max bolus 4000 units)  As needed (PRN)        Question:  Heparin Infusion Adjustment (DO NOT MODIFY ANSWER)  Answer:  \\[email or web address]\epic\Images\Pharmacy\HeparinInfusions\heparin LOW INTENSITY nomogram  for [facility] [id number]    08/08/23 1344     heparin 25,000 units in dextrose 5% (100 units/ml) IV bolus from bag - ADDITIONAL PRN BOLUS - 30 units/kg (max bolus 4000 units)  As needed (PRN)        Question:  Heparin Infusion Adjustment (DO NOT MODIFY ANSWER)  Answer:  \\[email or web address]\epic\Images\Pharmacy\HeparinInfusions\heparin LOW INTENSITY nomogram for [facility] [id number].pdf    08/08/23 1344     heparin 25,000 units in dextrose 5% (100 units/ml) IV bolus from bag INITIAL BOLUS (max bolus 4000 units)  Once        Question:  Heparin Infusion Adjustment (DO NOT MODIFY ANSWER)  Answer:  \\[email or web address]\epic\Images\Pharmacy\HeparinInfusions\heparin LOW INTENSITY nomogram for [facility] [id number].pdf    08/08/23 1344     heparin 25,000 units in dextrose 5% 250 mL (100 units/mL) infusion LOW INTENSITY nomogram - [facility]  Continuous        Question Answer Comment   Heparin Infusion Adjustment (DO NOT MODIFY ANSWER) \\[email or web address]\epic\Images\Pharmacy\HeparinInfusions\heparin LOW INTENSITY nomogram for [facility] [id number].pdf    Begin at (in units/kg/hr) 12        08/08/23 1344     IP VTE HIGH RISK PATIENT  Once         08/07/23 0252     Place sequential compression device  Until discontinued         08/07/23 0252                Discharge Planning   GEOVANNI: 8/9/2023     Code Status: Full Code   Is the patient medically ready for discharge?:     Reason for patient still in hospital (select all that apply): Patient trending condition, Treatment and Consult recommendations  Discharge Plan A: Rehab                  Abhinav Garcia MD  Department of [facility] Medicine   [facility]

## 2023-08-08 NOTE — PROGRESS NOTES
Individual Follow-Up Form    8/8/2023    Quit Date: To be determined    Clinical Status of Patient: Inpatient    Length of Service: 30 minutes    Comments: Smoking cessation education provided. Pt is a 2 pk/day cigarette smoker x 45 yrs. He states that he recently cut down to only a few cigarettes per day, but then increased to his usual 2 pk/day again. He states ready to quit. Ambulatory referral to Smoking Cessation clinic following hospital discharge.     Diagnosis: F17.210

## 2023-08-08 NOTE — PT/OT/SLP PROGRESS
Occupational Therapy   Treatment    Name: Paresh Vance Jr.  MRN: [id number]  Admitting Diagnosis:  NSTEMI (non-ST elevated myocardial infarction)       Recommendations:     Discharge Recommendations: other (see comments) (high intensity therapy)  Discharge Equipment Recommendations:  to be determined by next level of care  Barriers to discharge:  Other (Comment) (Pt requires increased level of assistance)    Assessment:     Paresh Vance Jr. is a 57 y.o. male with a medical diagnosis of NSTEMI (non-ST elevated myocardial infarction).  He presents with The primary encounter diagnosis was Aphasia [R47.01]. Diagnoses of CVA (cerebral vascular accident), Seizure, and Stroke were also pertinent to this visit. Performance deficits affecting function are weakness, impaired endurance, gait instability, impaired functional mobility, impaired balance, impaired self care skills, decreased coordination, impaired coordination, decreased lower extremity function, decreased safety awareness.     Pt progressing towards goals this date, agreeable to therapy.     Rehab Prognosis:  Good; patient would benefit from acute skilled OT services to address these deficits and reach maximum level of function.       Plan:     Patient to be seen 5 x/week to address the above listed problems via self-care/home management, therapeutic activities, therapeutic exercises  Plan of Care Expires: 09/07/23  Plan of Care Reviewed with: patient, daughter    Subjective     Chief Complaint: Weakness; mild dizziness  Patient/Family Comments/goals: Return to PLOF  Pain/Comfort:  Pain Rating 1: 0/10    Objective:     Communicated with: nslinda prior to session.  Patient found HOB elevated with Condom Catheter, peripheral IV, telemetry, bed alarm upon OT entry to room.    General Precautions: Standard, fall    Orthopedic Precautions:N/A  Braces: N/A  Respiratory Status: Room air     Occupational Performance:     Bed Mobility:    Patient completed  Scooting/Bridging with stand by assistance  Patient completed Supine to Sit with stand by assistance  Patient completed Sit to Supine with stand by assistance     Functional Mobility/Transfers:  Patient completed Sit <> Stand Transfer with contact guard assistance  with  rolling walker   Functional Mobility: Pt performing functional mobility with Min-ModA with no AD & use of SPC; pt demonstrates increased stability with use of RW. Pt unsteady with use of SPC.      [facility] 6 Click ADL: 17    Treatment & Education:  Pt educated on use of RW for safety.  Pt unsteady during functional mobility with lateral sway noted.  Pt with mild dizziness during functional mobility & taking seated rest break. BP taken at 104/55, HR 85, & O2 96% on RA.  Pt reporting dizziness resolving after seated rest break.  Pt continues to endorse visual issues that he reports were baseline prior to admission.  Pt with c/o pain to R shoulder with ax 4-5/10 pain; none at rest.  Pt participates well throughout therapy session.   Will progress as able.    Patient left HOB elevated with all lines intact, call button in reach, bed alarm on, and nsg notified    GOALS:   Multidisciplinary Problems       Occupational Therapy Goals          Problem: Occupational Therapy    Goal Priority Disciplines Outcome Interventions   Occupational Therapy Goal     OT, PT/OT Ongoing, Progressing    Description: Goals to be met by: 9/7/2023     Patient will increase functional independence with ADLs by performing:    UE Dressing with Modified [place].  LE Dressing with Set-up Assistance.  Grooming while standing with Set-up Assistance.  Toileting from toilet with Supervision for hygiene and clothing management.   Supine to sit with Modified [place].  Step transfer with Supervision & appropriate AD.  Toilet transfer to toilet with Supervision & appropriate AD.                         Time Tracking:     OT Date of Treatment: 08/08/23  OT Start Time: 1417  OT Stop  Time: 1442  OT Total Time (min): 25 min    Billable Minutes:Therapeutic Activity 25 cotx with PT    OT/AMINAH: OT     Number of AMINAH visits since last OT visit: 0    8/8/2023

## 2023-08-08 NOTE — PT/OT/SLP PROGRESS
Physical Therapy Treatment    Patient Name:  Paresh Vance Jr.   MRN:  [id number]    Recommendations:     Discharge Recommendations:  (high intensity therapy)  Discharge Equipment Recommendations: to be determined by next level of care  Barriers to discharge:  fall risk, increased assistance required    Assessment:     Paresh Vance Jr. is a 57 y.o. male admitted with a medical diagnosis of NSTEMI (non-ST elevated myocardial infarction).  He presents with the following impairments/functional limitations: weakness, gait instability, impaired balance, impaired endurance, impaired self care skills, impaired functional mobility, decreased coordination, decreased lower extremity function, decreased safety awareness, impaired skin . Pt is progressing towards goals, however, continues to be at increased risk for falls and presents with impaired functional mobility from baseline    Rehab Prognosis: Good; patient would benefit from acute skilled PT services to address these deficits and reach maximum level of function.    Recent Surgery: * No surgery found *      Plan:     During this hospitalization, patient to be seen 6 x/week to address the identified rehab impairments via therapeutic activities, therapeutic exercises, gait training, neuromuscular re-education and progress toward the following goals:    Plan of Care Expires:  09/07/23    Subjective     Chief Complaint: weakness, double vision / dizziness  Patient/Family Comments/goals: return to PLOF  Pain/Comfort:  Pain Rating 1: 0/10      Objective:     Communicated with nsg prior to session.  Patient found HOB elevated with Condom Catheter, peripheral IV, telemetry, bed alarm upon PT entry to room.     General Precautions: Standard, fall  Orthopedic Precautions: N/A  Braces: N/A  Respiratory Status: Room air     Functional Mobility:  Bed Mobility:     Scooting: stand by assistance  Supine to Sit: stand by assistance  Sit to Supine: stand by assistance  Transfers:      Sit to Stand:  contact guard assistance with hand-held assist and rolling walker  Gait: Pt ambulated ~40 ft without AD and with Min-ModA due to multiple LOB and pt unsteady; provided pt with SPC for ambulating ~40 ft and pt with continued unsteadiness and multiple LOB requiring Min-ModA and impaired use of SPC despite cueing; pt then ambulated ~50 ft with RW and [place] - increased stability with use of RW, cueing for proximity and proper use of RW; increased lateral sway and LOB in all directions during gait      AM-PAC 6 CLICK MOBILITY  Turning over in bed (including adjusting bedclothes, sheets and blankets)?: 3  Sitting down on and standing up from a chair with arms (e.g., wheelchair, bedside commode, etc.): 3  Moving from lying on back to sitting on the side of the bed?: 3  Moving to and from a bed to a chair (including a wheelchair)?: 3  Need to walk in hospital room?: 2  Climbing 3-5 steps with a railing?: 2  Basic Mobility Total Score: 16       Treatment & Education:  Pt ambulated multiple gait trials with no AD, with SPC, and RW as reported above  Pt demonstrated the most stability using RW  Pt with mild dizziness and seated rest break taken; BP: 104/55  Pt endorses double vision that remains constant throughout session which is chronic and present prior to hospital admission   Pt c/o R shld pain with activities which he reports is also chronic  Pt returned supine and educated on use of RW at this time for decreased fall risk    Patient left HOB elevated with all lines intact, call button in reach, bed alarm on, and nsg notified..    GOALS:   Multidisciplinary Problems       Physical Therapy Goals          Problem: Physical Therapy    Goal Priority Disciplines Outcome Goal Variances Interventions   Physical Therapy Goal     PT, PT/OT Ongoing, Progressing     Description: Goals to be met by: 23     Patient will increase functional independence with mobility by performin. Supine to sit with Stand-by  Assistance  2. Sit to supine with Stand-by Assistance  3. Sit to stand transfer with Contact Guard Assistance  4. Bed to chair transfer with Contact Guard Assistance using LRAD.  5. Gait  x 50 feet with Contact Guard Assistance using LRAD.   6. Lower extremity exercise program x10 reps per handout, with independence                         Time Tracking:     PT Received On: 08/08/23  PT Start Time: 1415     PT Stop Time: 1442  PT Total Time (min): 27 min     Billable Minutes: Gait Training 27 (cotx with OT)    Treatment Type: Treatment  PT/PTA: PT     Number of PTA visits since last PT visit: 0     08/08/2023

## 2023-08-08 NOTE — ASSESSMENT & PLAN NOTE
- Remains altered but improving. Could be post ictal + ativan use  - S/p Keppra load  - Continue IV Keppra 500mg q12h  - neurology consulted  - EEG not consistent with seizure

## 2023-08-08 NOTE — ASSESSMENT & PLAN NOTE
- reports of aphasia from family and noted at [facility]  - CTA head and neck done with mild left CCA disease and no right CCA or ICA disease  - MRI pending; Neurology consulted  - appears aphasia resolved; patient reports at baseline

## 2023-08-08 NOTE — ASSESSMENT & PLAN NOTE
- denies any chest pain or SOB prior to admission but very sedentary life style  - [facility] 5/2023 with OM2 70-80% mLAD 50-60% p-m RCA  with collateral from L  - initial troponin .029 with trend up to 17.321; EKG with NSR PVCs LVH unchanged from previous  - last echo in 5/2023 with normal LVEF and MR, AS and AI; repeat echo pending  - currently on ASA, statin and Plavix; no IV Heparin or SQ Lovenox given presentation concerning for CVA  - continue medical management for NSTEMI and trend troponin until peak occurs; await evaluation by Neurology in regards to AMS/CVA; angiogram films to be reviewed by staff

## 2023-08-08 NOTE — SUBJECTIVE & OBJECTIVE
Interval History:  Much more alert today.  Denies any chest pain or shortness of breath at this time.  Discussed with Neurology and Cardiology; does appear that he had a small stroke likely secondary to atheroemboli.  Okay for anticoagulation in the setting of NSTEMI.    Review of Systems   Respiratory:  Negative for shortness of breath.    Cardiovascular:  Negative for chest pain.     Objective:     Vital Signs (Most Recent):  Temp: 97.6 °F (36.4 °C) (08/08/23 1201)  Pulse: 82 (08/08/23 1214)  Resp: 19 (08/08/23 1201)  BP: (!) 108/55 (08/08/23 1201)  SpO2: 99 % (08/08/23 1201) Vital Signs (24h Range):  Temp:  [97.5 °F (36.4 °C)-100.7 °F (38.2 °C)] 97.6 °F (36.4 °C)  Pulse:  [76-92] 82  Resp:  [16-19] 19  SpO2:  [95 %-99 %] 99 %  BP: ()/(50-58) 108/55     Weight: 65.8 kg (145 lb)  Body mass index is 18.12 kg/m².    Intake/Output Summary (Last 24 hours) at 8/8/2023 1437  Last data filed at 8/8/2023 0500  Gross per 24 hour   Intake 240 ml   Output 1000 ml   Net -760 ml         Physical Exam  Vitals and nursing note reviewed.   Constitutional:       Appearance: Normal appearance. He is normal weight. He is not toxic-appearing.   HENT:      Head: Normocephalic and atraumatic.      Mouth/Throat:      Mouth: Mucous membranes are dry.   Eyes:      Extraocular Movements: Extraocular movements intact.      Pupils: Pupils are equal, round, and reactive to light.   Cardiovascular:      Rate and Rhythm: Normal rate and regular rhythm.      Pulses: Normal pulses.      Heart sounds: Murmur heard.   Pulmonary:      Effort: Pulmonary effort is normal.      Breath sounds: No wheezing.   Abdominal:      General: Abdomen is flat. Bowel sounds are normal. There is no distension.      Palpations: Abdomen is soft. There is no mass.   Musculoskeletal:         General: No swelling.   Skin:     General: Skin is warm.      Capillary Refill: Capillary refill takes less than 2 seconds.   Neurological:      Mental Status: He is oriented  to person, place, and time.   Psychiatric:         Behavior: Behavior normal.             Significant Labs: All pertinent labs within the past 24 hours have been reviewed.    Significant Imaging: I have reviewed all pertinent imaging results/findings within the past 24 hours.

## 2023-08-08 NOTE — ASSESSMENT & PLAN NOTE
Dangers of cigarette smoking were reviewed with patient in detail for 5 minutes and patient was encouraged to quit. Nicotine replacement options were discussed with the patient and they decided to use nicotine patch.

## 2023-08-08 NOTE — PLAN OF CARE
Problem: SLP  Goal: SLP Goal  Description: Goals:  1. Pt will tolerate a mech soft diet with thin liquids w/o any overt s/s of aspiration.   2. Pt will participate in full cognitive-linguistic eval to determine functional deficits pending MRI results.     Outcome: Met   Pt at baseline for cognition. Pt did self report use of marijuana to neuro MD for the last few years. Speech is at baseline due to oral surgery from years ago. SLP did corroborate hx from Cyndi Langley at bedside. Pt did have a seizure on this admit.

## 2023-08-08 NOTE — PROCEDURES
Routine EEG Report    Paresh Vance Jr.  [id number]  1965    DATE OF SERVICE: 8/7/2023  REASON FOR CONSULT:  57-year-old man with episodes of speech difficulty, decreased responsiveness, and collapse.  Evaluate for evidence of epileptiform activity.    METHODOLOGY   Electroencephalographic (EEG) recording is with electrodes placed according to the International 10-20 placement system.  Thirty two (32) channels of digital signal (sampling rate of 512/sec) including T1 and T2 was simultaneously recorded from the scalp and may include  EKG, EMG, and/or eye monitors.  Recording band pass was 0.1 to 512 hz.  Digital video recording of the patient is simultaneously recorded with the EEG.  The patient is instructed report clinical symptoms which may occur during the recording session.  EEG and video recording is stored and archived in digital format. Activation procedures which include photic stimulation, hyperventilation and instructing patients to perform simple task are done in selected patients.    The EEG is displayed on a monitor screen and can be reviewed using different montages.  Computer assisted analysis is employed to detect spike and electrographic seizure activity.   The entire record is submitted for computer analysis.  The entire recording is visually reviewed and the times identified by computer analysis as being spikes or seizures are reviewed again.  Compresses spectral analysis (CSA) is also performed on the activity recorded from each individual channel.  This is displayed as a power display of frequencies from 0 to 30 Hz over time.   The CSA is reviewed looking for asymmetries in power between homologous areas of the scalp and then compared with the original EEG recording.     [employer] software is also utilized in the review of this study.  This software suite analyzes the EEG recording in multiple domains.  Coherence and rhythmicity is computed to identify EEG sections which may contain organized  seizures.  Each channel undergoes analysis to detect presence of spike and sharp waves which have special and morphological characteristic of epileptic activity.  The routine EEG recording is converted from spacial into frequency domain.  This is then displayed comparing homologous areas to identify areas of significant asymmetry.  Algorithm to identify non-cortically generated artifact is used to separate eye movement, EMG and other artifact from the EEG.      EEG FINDINGS  Background activity:   The background is continuous mixed frequency theta/alpha activity with a moderately well-formed 8.5 hz posterior dominant rhythm seen bilaterally.  There is intermittent generalized and multifocal polymorphic slowing seen bilaterally.    Sleep:  The patient transitions from wakefulness to sleep with the appearance of sleep spindles, K complexes, and vertex waves.    Activation procedures:   The patient is able to follow simple commands and answers orientation questions correctly.     Cardiac Monitor:   Heart rate appears generally regular on a single lead EKG.    Impression:   This is an abnormal awake and asleep routine EEG because of mild intermittent slowing seen bilaterally consistent with subcortical/deep midline dysfunction.  There are no epileptiform discharges and no electrographic seizures.    Tasha Ford MD PhD [facility]  Neurology-Epilepsy  [facility].

## 2023-08-08 NOTE — ASSESSMENT & PLAN NOTE
- last echo with EF 50-55% with severe MR and moderate to severe AI/AS; RISA .48cm2 peak velocity 4.0m/sec and mean gradient of 35mmHg   - [facility] in 5/2023 with MV CAD- seen by Cardiology at [facility] with recommendation for CTS evaluation but lost to follow up due to family issues/stressors  - repeat echo today

## 2023-08-08 NOTE — PLAN OF CARE
[facility] reviewing at this time. SW will follow pt throughout his transitions of care and assist with any dc needs.      08/08/23 1152   Post-Acute Status   Post-Acute Authorization Placement

## 2023-08-08 NOTE — HPI
"58yo male with tobacco abuse, HTN, CAD, left SC artery stenosis, seizure, aphasia, NSTEMI, MR, AS, AI who presented to the ER with complaints of AMS. He was seen this morning on AM rounds while sitting up in bed eating breakfast and was completely oriented. He does not recall the events from yesterday therefore his presentation was obtained from [facility]. His son and daughter noted him to be altered with inability to speak and eventually fell to the ground and he was unable to get up. EMS was called and he was brought to [facility] with notation of expressive aphasia with code stroke called. CTA head and neck with no ICH and notation of old lacunar infarct then developed "seizure like activity" while undergoing CT scan and was given IV Ativan and Keppra with improvement and then notation of post ictal. Transferred to [facility] for Neurology evaluation. Labs CBC WNL. BMP WNL Tropnin .[phone]  EKG SR PVCs LVH. Admitted to [facility] and Cardiology consulted for evaluation of NSTEMI    Of note, patient tells me he underwent LHC in May 2023 and recalls being told that he has blockages in 3 arteries and issues with 3 of his valves. He tells me evaluation of open heart surgery was discussed but he did not see CTS due to family issues/stressors   "

## 2023-08-08 NOTE — NURSING
At this time called physician on-call for pt, Dr. Rooney, to inform of pt BP-90/53 and Temp- 100.7 at this time; physician on-call directs to continue to monitor pt and if BP changes or temperature continues to rise to notify him of such change; nursing continuing to monitor pt this shift; A&O X 3 to person, time, and situation; NADN; RR-18/min; call light in reach.

## 2023-08-08 NOTE — PROGRESS NOTES
CM notified by Mona at [facility] that Auth. Has been submitted. Pending I[facility] Auth at this time.   08/08/23 1511   Post-Acute Status   Post-Acute Authorization Placement

## 2023-08-08 NOTE — NURSING
At this time informed physician on-call, Lilliana Boyce NP of pt restraint orders expiring in 2 hours and 53 minutes and that pt has been doing well and off restraints since day shift; physician discontinues restraint orders at this time.

## 2023-08-08 NOTE — SUBJECTIVE & OBJECTIVE
Past Medical History:   Diagnosis Date    Basal cell carcinoma (BCC) of upper lip 6/20/2021    Coronary artery disease involving native coronary artery without angina pectoris 11/12/2015    Hyperlipidemia 11/12/2015    Hypertension     Syncope and collapse 6/29/2023       Past Surgical History:   Procedure Laterality Date    CORONARY ANGIOGRAPHY  2015    CORONARY ANGIOGRAPHY Right 5/12/2023    Procedure: ANGIOGRAM, CORONARY ARTERY;  Surgeon: Ryan Huertas MD;  Location: [facility] CATH LAB;  Service: Cardiology;  Laterality: Right;    EXCISION OF LESION OF LIP N/A 07/23/2021    Procedure: EXCISION, LESION, LIP;  Surgeon: Cezar Ellis MD;  Location: [facility] FLR;  Service: ENT;  Laterality: N/A;    FLAP PROCEDURE N/A 08/12/2021    Procedure: CREATION, FREE FLAP;  Surgeon: Cezar Ellis MD;  Location: [facility] OR [facility] FLR;  Service: ENT;  Laterality: N/A;  Radial forearm    FOOT SURGERY      LIP RECONSTRUCTION Bilateral 08/24/2021    Procedure: RECONSTRUCTION, LIP;  Surgeon: Cezar Ellis MD;  [facility];  Service: ENT;  Laterality: Bilateral;    LIP RECONSTRUCTION N/A 10/08/2021    Procedure: RECONSTRUCTION, LIP;  Surgeon: Cezar Ellis MD;  [facility];  Service: ENT;  Laterality: N/A;    NECK EXPLORATION Left 08/12/2021    Procedure: EXPLORATION, NECK;  Surgeon: Cezar Ellis MD;  [facility];  Service: ENT;  Laterality: Left;    ROTATION FLAP SURGERY  07/23/2021    Procedure: CREATION, FLAP, ROTATION;  Surgeon: Cezar Ellis MD;  [facility];  Service: ENT;;    SKIN SPLIT GRAFT Left 08/12/2021    Procedure: APPLICATION, GRAFT, SKIN, SPLIT-THICKNESS;  Surgeon: Cezar Ellis MD;  [facility];  Service: ENT;  Laterality: Left;       Review of patient's allergies indicates:  No Known Allergies    No current facility-administered medications on file prior to encounter.     Current Outpatient Medications on  File Prior to Encounter   Medication Sig    aspirin 81 MG Chew Take 1 tablet (81 mg total) by mouth once daily.    atorvastatin (LIPITOR) 80 MG tablet Take 1 tablet (80 mg total) by mouth once daily.    furosemide (LASIX) 40 MG tablet Take 1 tablet (40 mg total) by mouth once daily.    hydrocortisone 2.5 % cream Apply topically 2 (two) times daily.    losartan (COZAAR) 25 MG tablet Take 1 tablet (25 mg total) by mouth once daily.    EScitalopram oxalate (LEXAPRO) 10 MG tablet Take 1 tablet (10 mg total) by mouth every evening.    hydrOXYzine pamoate (VISTARIL) 25 MG Cap Take 1 capsule (25 mg total) by mouth 4 (four) times daily. (Patient not taking: Reported on 6/12/2023)    nicotine (NICODERM CQ) 21 mg/24 hr Place 1 patch onto the skin once daily. (Patient not taking: Reported on 6/12/2023)     Family History       Problem Relation (Age of Onset)    Heart disease Father    No Known Problems Mother, Sister, Maternal Grandmother, Maternal Grandfather, Paternal Grandmother, Paternal Grandfather, Brother, Maternal Aunt, Maternal Uncle, Paternal Aunt, Paternal Uncle          Tobacco Use    Smoking status: Every Day     Current packs/day: 0.50     Types: Cigarettes    Smokeless tobacco: Never   Substance and Sexual Activity    Alcohol use: Not Currently    Drug use: Yes     Types: Marijuana    Sexual activity: Not on file     Review of Systems   Constitutional: Negative for chills, decreased appetite, diaphoresis and fever.   Cardiovascular:  Negative for chest pain, claudication, cyanosis, dyspnea on exertion, irregular heartbeat, leg swelling, near-syncope, orthopnea, palpitations, paroxysmal nocturnal dyspnea and syncope.   Respiratory:  Negative for cough, hemoptysis, shortness of breath and wheezing.    Gastrointestinal:  Negative for bloating, abdominal pain, constipation, diarrhea, melena, nausea and vomiting.   Neurological:  Negative for dizziness and weakness.     Objective:     Vital Signs (Most  Recent):  Temp: 99.3 °F (37.4 °C) (08/08/23 0812)  Pulse: 76 (08/08/23 0812)  Resp: 19 (08/08/23 0812)  BP: (!) 100/55 (08/08/23 0812)  SpO2: 99 % (08/08/23 0812) Vital Signs (24h Range):  Temp:  [97.5 °F (36.4 °C)-100.7 °F (38.2 °C)] 99.3 °F (37.4 °C)  Pulse:  [76-96] 76  Resp:  [16-19] 19  SpO2:  [95 %-99 %] 99 %  BP: ()/(50-58) 100/55     Weight: 65.8 kg (145 lb)  Body mass index is 18.12 kg/m².    SpO2: 99 %         Intake/Output Summary (Last 24 hours) at 8/8/2023 0901  Last data filed at 8/8/2023 0500  Gross per 24 hour   Intake 240 ml   Output 2500 ml   Net -2260 ml       Lines/Drains/Airways       Peripheral Intravenous Line  Duration                  Peripheral IV - Single Lumen 08/06/23 2000 18 G Anterior;Left;Proximal Forearm 1 day         Peripheral IV - Single Lumen 08/07/23 0328 18 G Anterior;Right Forearm 1 day                     Physical Exam  Constitutional:       General: He is not in acute distress.     Appearance: He is well-developed.   Cardiovascular:      Rate and Rhythm: Normal rate and regular rhythm.      Heart sounds: Murmur heard.      No gallop.   Pulmonary:      Effort: Pulmonary effort is normal. No respiratory distress.      Breath sounds: Normal breath sounds. No wheezing.   Abdominal:      General: Bowel sounds are normal. There is no distension.      Palpations: Abdomen is soft.      Tenderness: There is no abdominal tenderness.   Skin:     General: Skin is warm and dry.   Neurological:      Mental Status: He is alert and oriented to person, place, and time.          Significant Labs: BMP:   Recent Labs   Lab 08/06/23 2228 08/08/23 0318    103   * 133*   K 3.7 3.9    105   CO2 22* 20*   BUN 7 10   CREATININE 0.9 0.9   CALCIUM 9.0 8.6*   MG 1.7 1.6    and CBC   Recent Labs   Lab 08/06/23 2228 08/08/23  0318   WBC 7.76 11.92   HGB 13.6* 11.9*   HCT 41.6 36.3*    138*       Significant Imaging: Echocardiogram: Transthoracic echo (TTE) complete  (Cupid Only):   Results for orders placed or performed during the hospital encounter of 08/07/23   Echo   Result Value Ref Range    BSA 1.87 m2    Beach's Biplane MOD Ejection Fraction 4 %    LA WIDTH 4.6 cm    TAPSE 1.85 cm    RA Width 2.3 cm    Est. RA pres 8 mmHg    LVOT stroke volume 57.32 cm3    LVIDd 5.05 3.5 - 6.0 cm    LV Systolic Volume 56.69 mL    LV Systolic Volume Index 29.7 mL/m2    LVIDs 3.66 2.1 - 4.0 cm    LV Diastolic Volume 121.09 mL    LV Diastolic Volume Index 63.40 mL/m2    IVS 1.32 (A) 0.6 - 1.1 cm    LVOT diameter 1.94 cm    LVOT area 3.0 cm2    FS 28 28 - 44 %    Left Ventricle Relative Wall Thickness 0.45 cm    Posterior Wall 1.14 (A) 0.6 - 1.1 cm    LV mass 245.85 g    LV Mass Index 129 g/m2    MV Peak E Douglas 1.50 m/s    TDI LATERAL 0.10 m/s    TDI SEPTAL 0.07 m/s    E/E' ratio 17.65 m/s    MV Peak A Douglas 1.68 m/s    E/A ratio 0.89     E wave deceleration time 179.49 msec    LV SEPTAL E/E' RATIO 21.43 m/s    LA Volume Index 39.8 mL/m2    LV LATERAL E/E' RATIO 15.00 m/s    LA volume 76.02 cm3    PV Peak S Douglas 0.60 m/s    PV Peak D Douglas 0.77 m/s    Pulm vein S/D ratio 0.78     LVOT peak douglas 1.12 m/s    Left Ventricular Outflow Tract Mean Velocity 0.79 cm/s    Left Ventricular Outflow Tract Mean Gradient 2.80 mmHg    LA volume (mod) 85.87 cm3    LA Volume Index (Mod) 45.0 mL/m2    LA size 3.26 cm    Left Atrium Major Axis 6.16 cm    Left Atrium Minor Axis 5.78 cm    RV S' 11.14 cm/s    RA Major Axis 4.69 cm    AV regurgitation pressure 1/2 time [id number] ms    AR Max Douglas 4.86 m/s    AV mean gradient 68 mmHg    AV peak gradient 82 mmHg    Ao peak douglas 4.52 m/s    Ao .40 cm    LVOT peak VTI 19.40 cm    AV valve area 0.52 cm²    AV Velocity Ratio 0.25     AV index (prosthetic) 0.18     RISA by Velocity Ratio 0.73 cm²    Mr max douglas 7.60 m/s    MV mean gradient 8 mmHg    MV peak gradient 13 mmHg    MV stenosis pressure 1/2 time 52.05 ms    MV valve area p 1/2 method 4.23 cm2    MV  valve area by continuity eq 1.80 cm2    MV VTI 31.9 cm    PV PEAK VELOCITY 1.06 m/s    PV peak gradient 4 mmHg    Pulmonary Valve Mean Velocity 0.73 m/s    IVC diameter 2.06 cm    Mean e' 0.09 m/s    ZLVIDS 0.79     ZLVIDD -0.62     RVDD 2.76 cm

## 2023-08-09 ENCOUNTER — CLINICAL SUPPORT (OUTPATIENT)
Dept: SMOKING CESSATION | Facility: CLINIC | Age: 58
End: 2023-08-09

## 2023-08-09 DIAGNOSIS — F17.210 CIGARETTE SMOKER: Primary | ICD-10-CM

## 2023-08-09 PROBLEM — I63.311 CEREBROVASCULAR ACCIDENT (CVA) DUE TO THROMBOSIS OF RIGHT MIDDLE CEREBRAL ARTERY: Status: ACTIVE | Noted: 2023-08-09

## 2023-08-09 PROBLEM — R47.01 APHASIA: Status: ACTIVE | Noted: 2023-08-09

## 2023-08-09 LAB
ALBUMIN SERPL BCP-MCNC: 3.5 G/DL (ref 3.5–5.2)
ALP SERPL-CCNC: 66 U/L (ref 55–135)
ALT SERPL W/O P-5'-P-CCNC: 11 U/L (ref 10–44)
ANION GAP SERPL CALC-SCNC: 9 MMOL/L (ref 8–16)
APTT PPP: 31.3 SEC (ref 21–32)
APTT PPP: 35.5 SEC (ref 21–32)
AST SERPL-CCNC: 38 U/L (ref 10–40)
BASOPHILS # BLD AUTO: 0.07 K/UL (ref 0–0.2)
BASOPHILS NFR BLD: 0.7 % (ref 0–1.9)
BILIRUB SERPL-MCNC: 1.8 MG/DL (ref 0.1–1)
BUN SERPL-MCNC: 10 MG/DL (ref 6–20)
CALCIUM SERPL-MCNC: 9.2 MG/DL (ref 8.7–10.5)
CHLORIDE SERPL-SCNC: 100 MMOL/L (ref 95–110)
CO2 SERPL-SCNC: 26 MMOL/L (ref 23–29)
CREAT SERPL-MCNC: 0.8 MG/DL (ref 0.5–1.4)
DIFFERENTIAL METHOD: ABNORMAL
EOSINOPHIL # BLD AUTO: 0.1 K/UL (ref 0–0.5)
EOSINOPHIL NFR BLD: 0.8 % (ref 0–8)
ERYTHROCYTE [DISTWIDTH] IN BLOOD BY AUTOMATED COUNT: 16 % (ref 11.5–14.5)
EST. GFR  (NO RACE VARIABLE): >60 ML/MIN/1.73 M^2
FIO2: 21 %
GLUCOSE SERPL-MCNC: 103 MG/DL (ref 70–110)
HCT VFR BLD AUTO: 36.8 % (ref 40–54)
HGB BLD-MCNC: 12.5 G/DL (ref 14–18)
IMM GRANULOCYTES # BLD AUTO: 0.04 K/UL (ref 0–0.04)
IMM GRANULOCYTES NFR BLD AUTO: 0.4 % (ref 0–0.5)
LYMPHOCYTES # BLD AUTO: 2.4 K/UL (ref 1–4.8)
LYMPHOCYTES NFR BLD: 25.4 % (ref 18–48)
MCH RBC QN AUTO: 33.5 PG (ref 27–31)
MCHC RBC AUTO-ENTMCNC: 34 G/DL (ref 32–36)
MCV RBC AUTO: 99 FL (ref 82–98)
MONOCYTES # BLD AUTO: 0.7 K/UL (ref 0.3–1)
MONOCYTES NFR BLD: 7.3 % (ref 4–15)
NEUTROPHILS # BLD AUTO: 6.3 K/UL (ref 1.8–7.7)
NEUTROPHILS NFR BLD: 65.4 % (ref 38–73)
NRBC BLD-RTO: 0 /100 WBC
PCO2 BLDA: 39.1 MMHG (ref 35–45)
PCO2 BLDA: 44 MMHG (ref 35–45)
PCO2 BLDA: 45.9 MMHG (ref 35–45)
PH SMN: 7.43 [PH] (ref 7.35–7.45)
PH SMN: 7.43 [PH] (ref 7.35–7.45)
PH SMN: 7.46 [PH] (ref 7.35–7.45)
PLATELET # BLD AUTO: 134 K/UL (ref 150–450)
PMV BLD AUTO: 12.8 FL (ref 9.2–12.9)
PO2 BLDA: 31.1 MMHG (ref 40–60)
PO2 BLDA: 33.1 MMHG (ref 40–60)
PO2 BLDA: 80.6 MMHG (ref 40–60)
POC BASE DEFICIT: 3.7 MMOL/L (ref -2–2)
POC BASE DEFICIT: 4.7 MMOL/L (ref -2–2)
POC BASE DEFICIT: 5.1 MMOL/L (ref -2–2)
POC HCO3: 27.8 MMOL/L (ref 24–28)
POC HCO3: 29.5 MMOL/L (ref 24–28)
POC HCO3: 30.2 MMOL/L (ref 24–28)
POC PERFORMED BY: ABNORMAL
POC SATURATED O2: 60.4 % (ref 95–100)
POC SATURATED O2: 64.8 % (ref 95–100)
POC SATURATED O2: 97.5 % (ref 95–100)
POTASSIUM SERPL-SCNC: 3.4 MMOL/L (ref 3.5–5.1)
PROT SERPL-MCNC: 6 G/DL (ref 6–8.4)
RBC # BLD AUTO: 3.73 M/UL (ref 4.6–6.2)
SODIUM SERPL-SCNC: 135 MMOL/L (ref 136–145)
SPECIMEN SOURCE: ABNORMAL
WBC # BLD AUTO: 9.6 K/UL (ref 3.9–12.7)

## 2023-08-09 PROCEDURE — 97116 GAIT TRAINING THERAPY: CPT | Mod: CQ

## 2023-08-09 PROCEDURE — 93460 R&L HRT ART/VENTRICLE ANGIO: CPT | Performed by: INTERNAL MEDICINE

## 2023-08-09 PROCEDURE — 63600175 PHARM REV CODE 636 W HCPCS: Performed by: INTERNAL MEDICINE

## 2023-08-09 PROCEDURE — 99232 PR SUBSEQUENT HOSPITAL CARE,LEVL II: ICD-10-PCS | Mod: ,,, | Performed by: PSYCHIATRY & NEUROLOGY

## 2023-08-09 PROCEDURE — 93460 R&L HRT ART/VENTRICLE ANGIO: CPT | Mod: 26,,, | Performed by: INTERNAL MEDICINE

## 2023-08-09 PROCEDURE — 99406 BEHAV CHNG SMOKING 3-10 MIN: CPT | Mod: S$GLB,,,

## 2023-08-09 PROCEDURE — 93799 UNLISTED CV SVC/PROCEDURE: CPT | Performed by: INTERNAL MEDICINE

## 2023-08-09 PROCEDURE — 25500020 PHARM REV CODE 255: Performed by: INTERNAL MEDICINE

## 2023-08-09 PROCEDURE — 99153 MOD SED SAME PHYS/QHP EA: CPT | Performed by: INTERNAL MEDICINE

## 2023-08-09 PROCEDURE — 25000003 PHARM REV CODE 250: Performed by: HOSPITALIST

## 2023-08-09 PROCEDURE — C1894 INTRO/SHEATH, NON-LASER: HCPCS | Performed by: INTERNAL MEDICINE

## 2023-08-09 PROCEDURE — 97110 THERAPEUTIC EXERCISES: CPT | Mod: CQ

## 2023-08-09 PROCEDURE — 99900035 HC TECH TIME PER 15 MIN (STAT)

## 2023-08-09 PROCEDURE — 80053 COMPREHEN METABOLIC PANEL: CPT | Performed by: HOSPITALIST

## 2023-08-09 PROCEDURE — 99152 MOD SED SAME PHYS/QHP 5/>YRS: CPT | Performed by: INTERNAL MEDICINE

## 2023-08-09 PROCEDURE — 85025 COMPLETE CBC W/AUTO DIFF WBC: CPT | Performed by: HOSPITALIST

## 2023-08-09 PROCEDURE — 93571 IV DOP VEL&/PRESS C FLO 1ST: CPT | Mod: 26,LD,, | Performed by: INTERNAL MEDICINE

## 2023-08-09 PROCEDURE — 27201423 OPTIME MED/SURG SUP & DEVICES STERILE SUPPLY: Performed by: INTERNAL MEDICINE

## 2023-08-09 PROCEDURE — C1769 GUIDE WIRE: HCPCS | Performed by: INTERNAL MEDICINE

## 2023-08-09 PROCEDURE — 99406 PT REFUSED TOBACCO CESSATION: ICD-10-PCS | Mod: S$GLB,,,

## 2023-08-09 PROCEDURE — 82810 BLOOD GASES O2 SAT ONLY: CPT

## 2023-08-09 PROCEDURE — 85730 THROMBOPLASTIN TIME PARTIAL: CPT | Performed by: HOSPITALIST

## 2023-08-09 PROCEDURE — 93571 IV DOP VEL&/PRESS C FLO 1ST: CPT | Mod: LD | Performed by: INTERNAL MEDICINE

## 2023-08-09 PROCEDURE — 11000001 HC ACUTE MED/SURG PRIVATE ROOM

## 2023-08-09 PROCEDURE — C1887 CATHETER, GUIDING: HCPCS | Performed by: INTERNAL MEDICINE

## 2023-08-09 PROCEDURE — 93571 PR HEART FLOW RESERV MEASURE,INIT VESSL: ICD-10-PCS | Mod: 26,LD,, | Performed by: INTERNAL MEDICINE

## 2023-08-09 PROCEDURE — 36415 COLL VENOUS BLD VENIPUNCTURE: CPT | Performed by: HOSPITALIST

## 2023-08-09 PROCEDURE — S4991 NICOTINE PATCH NONLEGEND: HCPCS | Performed by: HOSPITALIST

## 2023-08-09 PROCEDURE — 99232 SBSQ HOSP IP/OBS MODERATE 35: CPT | Mod: ,,, | Performed by: PSYCHIATRY & NEUROLOGY

## 2023-08-09 PROCEDURE — 99152 MOD SED SAME PHYS/QHP 5/>YRS: CPT | Mod: ,,, | Performed by: INTERNAL MEDICINE

## 2023-08-09 PROCEDURE — 99152 PR MOD CONSCIOUS SEDATION, SAME PHYS, 5+ YRS, FIRST 15 MIN: ICD-10-PCS | Mod: ,,, | Performed by: INTERNAL MEDICINE

## 2023-08-09 PROCEDURE — 94761 N-INVAS EAR/PLS OXIMETRY MLT: CPT

## 2023-08-09 PROCEDURE — 25000003 PHARM REV CODE 250: Performed by: INTERNAL MEDICINE

## 2023-08-09 PROCEDURE — 93460 PR CATH PLACE/CORON ANGIO, IMG SUPER/INTERP,R&L HRT CATH, L HRT VENTRIC: ICD-10-PCS | Mod: 26,,, | Performed by: INTERNAL MEDICINE

## 2023-08-09 RX ORDER — LIDOCAINE HYDROCHLORIDE 10 MG/ML
INJECTION INFILTRATION; PERINEURAL
Status: DISCONTINUED | OUTPATIENT
Start: 2023-08-09 | End: 2023-08-09 | Stop reason: HOSPADM

## 2023-08-09 RX ORDER — POTASSIUM CHLORIDE 20 MEQ/1
40 TABLET, EXTENDED RELEASE ORAL ONCE
Status: COMPLETED | OUTPATIENT
Start: 2023-08-09 | End: 2023-08-09

## 2023-08-09 RX ORDER — HEPARIN SODIUM 1000 [USP'U]/ML
INJECTION, SOLUTION INTRAVENOUS; SUBCUTANEOUS
Status: DISCONTINUED | OUTPATIENT
Start: 2023-08-09 | End: 2023-08-09 | Stop reason: HOSPADM

## 2023-08-09 RX ORDER — FENTANYL CITRATE 50 UG/ML
INJECTION, SOLUTION INTRAMUSCULAR; INTRAVENOUS
Status: DISCONTINUED | OUTPATIENT
Start: 2023-08-09 | End: 2023-08-09 | Stop reason: HOSPADM

## 2023-08-09 RX ORDER — HEPARIN SODIUM 200 [USP'U]/100ML
INJECTION, SOLUTION INTRAVENOUS
Status: DISCONTINUED | OUTPATIENT
Start: 2023-08-09 | End: 2023-08-10 | Stop reason: HOSPADM

## 2023-08-09 RX ORDER — VERAPAMIL HYDROCHLORIDE 2.5 MG/ML
INJECTION, SOLUTION INTRAVENOUS
Status: DISCONTINUED | OUTPATIENT
Start: 2023-08-09 | End: 2023-08-09 | Stop reason: HOSPADM

## 2023-08-09 RX ORDER — IODIXANOL 320 MG/ML
INJECTION, SOLUTION INTRAVASCULAR
Status: DISCONTINUED | OUTPATIENT
Start: 2023-08-09 | End: 2023-08-09 | Stop reason: HOSPADM

## 2023-08-09 RX ORDER — SODIUM CHLORIDE 9 MG/ML
INJECTION, SOLUTION INTRAVENOUS CONTINUOUS
Status: ACTIVE | OUTPATIENT
Start: 2023-08-09 | End: 2023-08-09

## 2023-08-09 RX ORDER — MIDAZOLAM HYDROCHLORIDE 1 MG/ML
INJECTION, SOLUTION INTRAMUSCULAR; INTRAVENOUS
Status: DISCONTINUED | OUTPATIENT
Start: 2023-08-09 | End: 2023-08-09 | Stop reason: HOSPADM

## 2023-08-09 RX ADMIN — POTASSIUM CHLORIDE 40 MEQ: 1500 TABLET, EXTENDED RELEASE ORAL at 09:08

## 2023-08-09 RX ADMIN — METOPROLOL SUCCINATE 12.5 MG: 25 TABLET, EXTENDED RELEASE ORAL at 09:08

## 2023-08-09 RX ADMIN — ATORVASTATIN CALCIUM 80 MG: 40 TABLET, FILM COATED ORAL at 09:08

## 2023-08-09 RX ADMIN — ESCITALOPRAM OXALATE 10 MG: 10 TABLET ORAL at 08:08

## 2023-08-09 RX ADMIN — NICOTINE 1 PATCH: 14 PATCH, EXTENDED RELEASE TRANSDERMAL at 09:08

## 2023-08-09 RX ADMIN — CLOPIDOGREL BISULFATE 75 MG: 75 TABLET ORAL at 09:08

## 2023-08-09 RX ADMIN — ASPIRIN 81 MG: 81 TABLET, COATED ORAL at 09:08

## 2023-08-09 RX ADMIN — LEVETIRACETAM 500 MG: 5 INJECTION INTRAVENOUS at 10:08

## 2023-08-09 RX ADMIN — LEVETIRACETAM 500 MG: 5 INJECTION INTRAVENOUS at 08:08

## 2023-08-09 RX ADMIN — SODIUM CHLORIDE: 0.9 INJECTION, SOLUTION INTRAVENOUS at 01:08

## 2023-08-09 NOTE — PLAN OF CARE
Patient transfered to cath lab bay # 3 via stretcher with side rails up x2. Pt AAOx4 and able to follow commands. Pt is stable when connecting to cardiac monitors. VSS.   Right radial vasc band in place with 12 cc of air in band. Site is CDI. No redness, bruising, or hematoma noted around site. +2 mary radial pulses palpated. Dopplered mary pedal pulses. Skin normal in color and warm to touch, <3 sec cap refill.  Fall risk precautions given and patient acknowledges.  AIDET completed to pt. Will continue to monitor patient.

## 2023-08-09 NOTE — PLAN OF CARE
Patient transferred via stretcher / wheelchair to Room 470 4th floor tele on assigned cardiac tele monitor. VSS, AAOx4. No c/o pain.     Right radial gauze/tegaderm site CDI. No bleeding no hematoma noted. Site and surrounding area soft.  Assessed by Betsy SAENZ at bedside. +2 mary radial pulses. Dopplered mary pedal pulses. All questions answered.

## 2023-08-09 NOTE — PLAN OF CARE
Problem: Adult Inpatient Plan of Care  Goal: Plan of Care Review  Outcome: Ongoing, Progressing     Problem: Adjustment to Illness (Stroke, Ischemic/Transient Ischemic Attack)  Goal: Optimal Coping  Outcome: Ongoing, Progressing     Problem: Cerebral Tissue Perfusion (Stroke, Ischemic/Transient Ischemic Attack)  Goal: Optimal Cerebral Tissue Perfusion  Outcome: Ongoing, Progressing     Problem: Functional Ability Impaired (Stroke, Ischemic/Transient Ischemic Attack)  Goal: Optimal Functional Ability  Outcome: Ongoing, Progressing     Problem: Fall Injury Risk  Goal: Absence of Fall and Fall-Related Injury  Outcome: Ongoing, Progressing

## 2023-08-09 NOTE — ASSESSMENT & PLAN NOTE
- NSTEMI earlier in the year with LHC showing severe 3v dz. CTS denied him for surgery at that time  - Continue DAPT, statin  - Telemetry  - start metoprolol  - see NSTEMI

## 2023-08-09 NOTE — PLAN OF CARE
KWADWO met with pt at bedside during rounding with MD. No dc today. Upon dc anticipate dc to IPR. Pending IPR auth at this time. SW will continue to follow pt throughout his transitions of care and assist with any dc needs. Pt did confirm that he is agreeable to dc to [facility].    KWADWO was made aware by Mona with [facility] that auth has been received. Auth is good for 4 days until its  and needs resubmission.      Pending pts medical clearance at this time.      23 0812   Post-Acute Status   Post-Acute Authorization Placement

## 2023-08-09 NOTE — SUBJECTIVE & OBJECTIVE
Interval History:  Doing well today; denies any chest pain or shortness of breath at this time.  He is sitting comfortably in bed.  States that he is hoping to become more ambulatory and is looking forward to working with PT/OT today.  Discussed with Cardiology with tentative plans for left heart catheterization, timing to be determined.  NPO for now.    Review of Systems   Respiratory:  Negative for shortness of breath.    Cardiovascular:  Negative for chest pain.     Objective:     Vital Signs (Most Recent):  Temp: 96.5 °F (35.8 °C) (08/09/23 0807)  Pulse: 75 (08/09/23 0807)  Resp: 18 (08/09/23 0807)  BP: 123/60 (08/09/23 0807)  SpO2: 96 % (08/09/23 0807) Vital Signs (24h Range):  Temp:  [96.5 °F (35.8 °C)-98.7 °F (37.1 °C)] 96.5 °F (35.8 °C)  Pulse:  [68-91] 75  Resp:  [16-19] 18  SpO2:  [96 %-99 %] 96 %  BP: ()/(52-60) 123/60     Weight: 65.8 kg (145 lb)  Body mass index is 18.12 kg/m².    Intake/Output Summary (Last 24 hours) at 8/9/2023 1008  Last data filed at 8/9/2023 0440  Gross per 24 hour   Intake 593.36 ml   Output 2750 ml   Net -2156.64 ml           Physical Exam  Vitals and nursing note reviewed.   Constitutional:       Appearance: Normal appearance. He is normal weight. He is not toxic-appearing.   HENT:      Head: Normocephalic and atraumatic.      Mouth/Throat:      Mouth: Mucous membranes are dry.   Eyes:      Extraocular Movements: Extraocular movements intact.      Pupils: Pupils are equal, round, and reactive to light.   Cardiovascular:      Rate and Rhythm: Normal rate and regular rhythm.      Pulses: Normal pulses.      Heart sounds: Murmur heard.   Pulmonary:      Effort: Pulmonary effort is normal.      Breath sounds: No wheezing.   Abdominal:      General: Abdomen is flat. Bowel sounds are normal. There is no distension.      Palpations: Abdomen is soft. There is no mass.   Musculoskeletal:         General: No swelling.   Skin:     General: Skin is warm.      Capillary Refill:  Capillary refill takes less than 2 seconds.   Neurological:      Mental Status: He is oriented to person, place, and time.   Psychiatric:         Behavior: Behavior normal.             Significant Labs: All pertinent labs within the past 24 hours have been reviewed.    Significant Imaging: I have reviewed all pertinent imaging results/findings within the past 24 hours.

## 2023-08-09 NOTE — PROGRESS NOTES
"[facility] Medicine  Progress Note    Patient Name: Paresh Vance Jr.  MRN: [id number]  Patient Class: IP- Inpatient   Admission Date: 8/7/2023  Length of Stay: 2 days  Attending Physician: Abhinav Garcia,*  Primary Care Provider: Gloria Shahid MD        Subjective:     Principal Problem:NSTEMI (non-ST elevated myocardial infarction)        HPI:  58 yo male with a PMHx of HTN, HLD, 3v CAD, severe valvular disease (severe MR, mod AR, mod AS) here for AMS and seizure vs possible stroke.    History obtained from son and daughter as patient is altered. Son found his father unable to speak and eventually fell to the ground scrapping his elbow and nose. When he was able to get him up, he was altered and had trouble speaking prompting EMS arrival. Family says he does not drink EtOH or do any drugs except for marijuana. Has not had any significant medical issues, except for a NSTEM earlier this year.    At [facility], the patient was noted to have apparently an expressive aphasia prompting stroke code. CTH negative for ICH but did show a small, likely old lacunar infarct. He then developed a seizure, or "seizure like activity", in the CT scan. He received 3mg IV ativan and loaded with Keppra with resolution of symptoms and was post-ictal. Neurology recommending MRI and neuro consult.      Overview/Hospital Course:  No notes on file    Interval History:  Doing well today; denies any chest pain or shortness of breath at this time.  He is sitting comfortably in bed.  States that he is hoping to become more ambulatory and is looking forward to working with PT/OT today.  Discussed with Cardiology with tentative plans for left heart catheterization, timing to be determined.  NPO for now.    Review of Systems   Respiratory:  Negative for shortness of breath.    Cardiovascular:  Negative for chest pain.     Objective:     Vital Signs (Most Recent):  Temp: 96.5 °F (35.8 °C) (08/09/23 0807)  Pulse: 75 " (08/09/23 0807)  Resp: 18 (08/09/23 0807)  BP: 123/60 (08/09/23 0807)  SpO2: 96 % (08/09/23 0807) Vital Signs (24h Range):  Temp:  [96.5 °F (35.8 °C)-98.7 °F (37.1 °C)] 96.5 °F (35.8 °C)  Pulse:  [68-91] 75  Resp:  [16-19] 18  SpO2:  [96 %-99 %] 96 %  BP: ()/(52-60) 123/60     Weight: 65.8 kg (145 lb)  Body mass index is 18.12 kg/m².    Intake/Output Summary (Last 24 hours) at 8/9/2023 1008  Last data filed at 8/9/2023 0440  Gross per 24 hour   Intake 593.36 ml   Output 2750 ml   Net -2156.64 ml           Physical Exam  Vitals and nursing note reviewed.   Constitutional:       Appearance: Normal appearance. He is normal weight. He is not toxic-appearing.   HENT:      Head: Normocephalic and atraumatic.      Mouth/Throat:      Mouth: Mucous membranes are dry.   Eyes:      Extraocular Movements: Extraocular movements intact.      Pupils: Pupils are equal, round, and reactive to light.   Cardiovascular:      Rate and Rhythm: Normal rate and regular rhythm.      Pulses: Normal pulses.      Heart sounds: Murmur heard.   Pulmonary:      Effort: Pulmonary effort is normal.      Breath sounds: No wheezing.   Abdominal:      General: Abdomen is flat. Bowel sounds are normal. There is no distension.      Palpations: Abdomen is soft. There is no mass.   Musculoskeletal:         General: No swelling.   Skin:     General: Skin is warm.      Capillary Refill: Capillary refill takes less than 2 seconds.   Neurological:      Mental Status: He is oriented to person, place, and time.   Psychiatric:         Behavior: Behavior normal.             Significant Labs: All pertinent labs within the past 24 hours have been reviewed.    Significant Imaging: I have reviewed all pertinent imaging results/findings within the past 24 hours.      Assessment/Plan:      * NSTEMI (non-ST elevated myocardial infarction)  -troponin peak 17.3, now down trending   -discussed with Cardiology and Neurology given complexity of patient   -continue  DAPT, statin, IV heparin drip for 48 hours   -will need repeat angiogram to better assess coronaries in preparation for possible CABG, SAVR, and SMVR evaluation  -currently chest pain-free  -initiated metoprolol today      Valvular heart disease with severe MR, mod AR and AS  - Per CTS notes, not a candidate for CABG or valve replacement  - Considering TAVR first with revascularization then potential LAUREANO  - Stable hemodynamically  - Monitor with mild diuresis as needed      Acute stroke due to ischemia  - Concern for acute CVA as he had reported expressive aphasia  - MRI brain with small punctate lesion, at low risk for hemorrhagic conversion  - A1c and lipids noted  - ASA, plavix, lipitor 80mg  - Neurology consult  - Permissive HTN with SBP < 220 unless HTN worsens MR or AR  - Stroke pathway  - PT/OT/SLP      Seizure  - Remains altered but improving. Could be post ictal + ativan use  - S/p Keppra load  - Continue IV Keppra 500mg q12h  - neurology consulted  - EEG not consistent with seizure      Subclavian artery stenosis  -significant left subclavian artery stenosis noted on CTA  -blood pressure on the right      Essential hypertension  - Holding anti-HTN for now      Coronary artery disease involving native coronary artery without angina pectoris  - NSTEMI earlier in the year with LHC showing severe 3v dz. CTS denied him for surgery at that time  - Continue DAPT, statin  - Telemetry  - start metoprolol  - see NSTEMI    Tobacco abuse  Dangers of cigarette smoking were reviewed with patient in detail for 5 minutes and patient was encouraged to quit. Nicotine replacement options were discussed with the patient and they decided to use nicotine patch.           VTE Risk Mitigation (From admission, onward)         Ordered     heparin 25,000 units in dextrose 5% (100 units/ml) IV bolus from bag - ADDITIONAL PRN BOLUS - 60 units/kg (max bolus 4000 units)  As needed (PRN)        Question:  Heparin Infusion Adjustment (DO  NOT MODIFY ANSWER)  Answer:  \\[email or web address]\epic\Images\Pharmacy\HeparinInfusions\heparin LOW INTENSITY nomogram for [facility] [id number].pdf    08/08/23 1344     heparin 25,000 units in dextrose 5% (100 units/ml) IV bolus from bag - ADDITIONAL PRN BOLUS - 30 units/kg (max bolus 4000 units)  As needed (PRN)        Question:  Heparin Infusion Adjustment (DO NOT MODIFY ANSWER)  Answer:  \\[email or web address]\epic\Images\Pharmacy\HeparinInfusions\heparin LOW INTENSITY nomogram for [facility] [id number].pdf    08/08/23 1344     heparin 25,000 units in dextrose 5% 250 mL (100 units/mL) infusion LOW INTENSITY nomogram - [facility]  Continuous        Question Answer Comment   Heparin Infusion Adjustment (DO NOT MODIFY ANSWER) \\[email or web address]\epic\Images\Pharmacy\HeparinInfusions\heparin LOW INTENSITY nomogram for [facility] [id number].pdf    Begin at (in units/kg/hr) 12        08/08/23 1344     IP VTE HIGH RISK PATIENT  Once         08/07/23 0252     Place sequential compression device  Until discontinued         08/07/23 0252                Discharge Planning   GEOVANNI: 8/9/2023     Code Status: Full Code   Is the patient medically ready for discharge?:     Reason for patient still in hospital (select all that apply): Patient trending condition, Consult recommendations, PT / OT recommendations and Pending disposition  Discharge Plan A: Rehab                  Abhinav Garcia MD  Department of Hospital Medicine   [facility]

## 2023-08-09 NOTE — PT/OT/SLP PROGRESS
"Physical Therapy Treatment    Patient Name:  Paresh Vance Jr.   MRN:  [id number]    Recommendations:     Discharge Recommendations:  (High Intensity Therapy)  Discharge Equipment Recommendations:  (TBD at next level of care)  Barriers to discharge:  Decreased functional mobility safety awareness     Assessment:     Paresh Vance Jr. is a 57 y.o. male admitted with a medical diagnosis of NSTEMI (non-ST elevated myocardial infarction).  He presents with the following impairments/functional limitations: weakness, impaired endurance, impaired self care skills, impaired functional mobility, gait instability, impaired balance, decreased coordination, decreased upper extremity function, decreased lower extremity function, decreased safety awareness, decreased ROM, impaired coordination, impaired skin. Pt able to increase ambulation distance as well as therapeutic activity this session. Performed ambulation training ~100 ft with RW requiring min/CGA. 1 LOB noted while turning requiring min/CGA to recover. Would benefit from continued PT services to increase pt's out of bed therapeutic activity and exercises.    Rehab Prognosis: Good; patient would benefit from acute skilled PT services to address these deficits and reach maximum level of function.    Recent Surgery: Procedure(s) (LRB):  Left heart cath (Left)  INSERTION, CATHETER, RIGHT HEART (Right) Day of Surgery    Plan:     During this hospitalization, patient to be seen 6 x/week to address the identified rehab impairments via gait training, therapeutic activities, therapeutic exercises and progress toward the following goals:    Plan of Care Expires:  09/07/23    Subjective     Chief Complaint: None Expressed  Patient/Family Comments/goals: "I feel a lot better today. I don't remember walking yesterday with y'all".  Pain/Comfort:  Pain Rating 1: 0/10  Pain Rating Post-Intervention 1: 0/10 (Did not complain of pain)      Objective:     Communicated with nurse prior " to session.  Patient found supine with bed alarm, telemetry upon PT entry to room.     General Precautions: Standard, fall  Orthopedic Precautions: N/A  Braces: N/A  Respiratory Status: Room air     Functional Mobility:  Bed Mobility:     Rolling Right: supervision and stand by assistance  Scooting: supervision and stand by assistance  Supine to Sit: supervision and stand by assistance  Sit to Supine: supervision and stand by assistance  Transfers:     Sit to Stand:  stand by assistance and contact guard assistance with rolling walker  Gait: ~100 ft with RW requiring min/CGA      AM-PAC 6 CLICK MOBILITY  Turning over in bed (including adjusting bedclothes, sheets and blankets)?: 4  Sitting down on and standing up from a chair with arms (e.g., wheelchair, bedside commode, etc.): 3  Moving from lying on back to sitting on the side of the bed?: 4  Moving to and from a bed to a chair (including a wheelchair)?: 3  Need to walk in hospital room?: 3  Climbing 3-5 steps with a railing?: 3  Basic Mobility Total Score: 20       Treatment & Education:  Pt instructed in and perform 1 x 20 sit to stand transfer training's for exercise requiring SBA. Also, instructed in and performed 1 x 15 reps of LAQ's and hip abd/abb. Performed ambulation training ~100 ft with RW requiring min/CGA. 1 loss of balance while turning which required min/CGA to recover. Pt states that he lives with double vision and states that he is usually dizzy because of this.     Patient left HOB elevated with all lines intact, call button in reach, bed alarm on, and nurse notified..    GOALS:   Multidisciplinary Problems       Physical Therapy Goals          Problem: Physical Therapy    Goal Priority Disciplines Outcome Goal Variances Interventions   Physical Therapy Goal     PT, PT/OT Ongoing, Progressing     Description: Goals to be met by: 23     Patient will increase functional independence with mobility by performin. Supine to sit with Stand-by  Assistance  2. Sit to supine with Stand-by Assistance  3. Sit to stand transfer with Contact Guard Assistance  4. Bed to chair transfer with Contact Guard Assistance using LRAD.  5. Gait  x 50 feet with Contact Guard Assistance using LRAD.   6. Lower extremity exercise program x10 reps per handout, with independence                         Time Tracking:     PT Received On: 08/09/23  PT Start Time: 0901     PT Stop Time: 0933  PT Total Time (min): 32 min     Billable Minutes: Gait Training 20 and Therapeutic Exercise 12    Treatment Type: Treatment  PT/PTA: PTA     Number of PTA visits since last PT visit: 1     08/09/2023

## 2023-08-09 NOTE — ASSESSMENT & PLAN NOTE
-troponin peak 17.3, now down trending   -discussed with Cardiology and Neurology given complexity of patient   -continue DAPT, statin, IV heparin drip for 48 hours   -will need repeat angiogram to better assess coronaries in preparation for possible CABG, SAVR, and SMVR evaluation  -currently chest pain-free  -initiated metoprolol today

## 2023-08-09 NOTE — PLAN OF CARE
Remaining air removed from R radial vasc band. Gauze and tegaderm dressing applied. Site is CDI. No bleeding or hematoma noted around site. Site and surrounding areas soft. +2 mary radial pulses palpated. Skin normal in color, warm to touch, < 3 sec cap refill. Will continue to monitor pt.

## 2023-08-09 NOTE — PLAN OF CARE
2 cc of air removed from R radial vasc band. No hematoma or bleeding noted. +2 mary radial pulses palpated. Skin is normal in color, warm to touch, < 3 sec cap refill. VSS. Will continue to monitor pt for safety and needs.

## 2023-08-09 NOTE — PLAN OF CARE
Problem: Physical Therapy  Goal: Physical Therapy Goal  Description: Goals to be met by: 23     Patient will increase functional independence with mobility by performin. Supine to sit with Stand-by Assistance  2. Sit to supine with Stand-by Assistance  3. Sit to stand transfer with Contact Guard Assistance  4. Bed to chair transfer with Contact Guard Assistance using LRAD.  5. Gait  x 50 feet with Contact Guard Assistance using LRAD.   6. Lower extremity exercise program x10 reps per handout, with independence    Outcome: Ongoing, Progressing   Pt continues to work toward all goals. Able to perform ambulation training ~100 ft with RW requiring min/CGA. 1 loss of balance while turning which required min/CGA to recover.

## 2023-08-09 NOTE — PT/OT/SLP PROGRESS
Occupational Therapy      Patient Name:  Paresh Vance .   MRN:  [id number]    Patient not seen today secondary to Other (Comment) (GEOVANNY - L heart cath). Will follow-up as able.    8/9/2023

## 2023-08-09 NOTE — NURSING
Peripheral IV insertion Note   Peripheral IV inserted.  22 g to the right wrist.  ID band replaced. Primary nurse notified.

## 2023-08-09 NOTE — PROGRESS NOTES
Smoking cessation education follow-up: Pt denies nicotine withdrawal symptoms with patch in use. Discussed pt's Ambulatory Smoking Cessation clinic appointment, and allowed time for all questions. Discussed the dangers of 2nd and 3rd hand smoke, and encouraged pt to implement a 'clean indoor air' policy in his home. (currently he and other family member smoke inside their home)

## 2023-08-10 ENCOUNTER — CLINICAL SUPPORT (OUTPATIENT)
Dept: SMOKING CESSATION | Facility: CLINIC | Age: 58
End: 2023-08-10

## 2023-08-10 VITALS
BODY MASS INDEX: 18.03 KG/M2 | HEIGHT: 75 IN | WEIGHT: 145 LBS | RESPIRATION RATE: 18 BRPM | SYSTOLIC BLOOD PRESSURE: 170 MMHG | TEMPERATURE: 97 F | HEART RATE: 75 BPM | DIASTOLIC BLOOD PRESSURE: 78 MMHG | OXYGEN SATURATION: 99 %

## 2023-08-10 DIAGNOSIS — F17.210 CIGARETTE SMOKER: Primary | ICD-10-CM

## 2023-08-10 LAB
ANION GAP SERPL CALC-SCNC: 8 MMOL/L (ref 8–16)
BASOPHILS # BLD AUTO: 0.07 K/UL (ref 0–0.2)
BASOPHILS NFR BLD: 0.7 % (ref 0–1.9)
BUN SERPL-MCNC: 10 MG/DL (ref 6–20)
CALCIUM SERPL-MCNC: 9.1 MG/DL (ref 8.7–10.5)
CHLORIDE SERPL-SCNC: 103 MMOL/L (ref 95–110)
CO2 SERPL-SCNC: 25 MMOL/L (ref 23–29)
CREAT SERPL-MCNC: 0.8 MG/DL (ref 0.5–1.4)
DIFFERENTIAL METHOD: ABNORMAL
EOSINOPHIL # BLD AUTO: 0.1 K/UL (ref 0–0.5)
EOSINOPHIL NFR BLD: 1 % (ref 0–8)
ERYTHROCYTE [DISTWIDTH] IN BLOOD BY AUTOMATED COUNT: 16.4 % (ref 11.5–14.5)
EST. GFR  (NO RACE VARIABLE): >60 ML/MIN/1.73 M^2
GLUCOSE SERPL-MCNC: 96 MG/DL (ref 70–110)
HCT VFR BLD AUTO: 34.2 % (ref 40–54)
HGB BLD-MCNC: 11.9 G/DL (ref 14–18)
IMM GRANULOCYTES # BLD AUTO: 0.04 K/UL (ref 0–0.04)
IMM GRANULOCYTES NFR BLD AUTO: 0.4 % (ref 0–0.5)
LYMPHOCYTES # BLD AUTO: 2.1 K/UL (ref 1–4.8)
LYMPHOCYTES NFR BLD: 21.6 % (ref 18–48)
MCH RBC QN AUTO: 34 PG (ref 27–31)
MCHC RBC AUTO-ENTMCNC: 34.8 G/DL (ref 32–36)
MCV RBC AUTO: 98 FL (ref 82–98)
MONOCYTES # BLD AUTO: 0.8 K/UL (ref 0.3–1)
MONOCYTES NFR BLD: 7.8 % (ref 4–15)
NEUTROPHILS # BLD AUTO: 6.7 K/UL (ref 1.8–7.7)
NEUTROPHILS NFR BLD: 68.5 % (ref 38–73)
NRBC BLD-RTO: 0 /100 WBC
PLATELET # BLD AUTO: 131 K/UL (ref 150–450)
PMV BLD AUTO: 12.3 FL (ref 9.2–12.9)
POTASSIUM SERPL-SCNC: 3.9 MMOL/L (ref 3.5–5.1)
RBC # BLD AUTO: 3.5 M/UL (ref 4.6–6.2)
SODIUM SERPL-SCNC: 136 MMOL/L (ref 136–145)
WBC # BLD AUTO: 9.72 K/UL (ref 3.9–12.7)

## 2023-08-10 PROCEDURE — S4991 NICOTINE PATCH NONLEGEND: HCPCS | Performed by: HOSPITALIST

## 2023-08-10 PROCEDURE — 85025 COMPLETE CBC W/AUTO DIFF WBC: CPT | Performed by: INTERNAL MEDICINE

## 2023-08-10 PROCEDURE — 25000003 PHARM REV CODE 250: Performed by: HOSPITALIST

## 2023-08-10 PROCEDURE — 80048 BASIC METABOLIC PNL TOTAL CA: CPT | Performed by: HOSPITALIST

## 2023-08-10 PROCEDURE — 63600175 PHARM REV CODE 636 W HCPCS: Performed by: INTERNAL MEDICINE

## 2023-08-10 PROCEDURE — 36415 COLL VENOUS BLD VENIPUNCTURE: CPT | Performed by: HOSPITALIST

## 2023-08-10 PROCEDURE — 99406 PT REFUSED TOBACCO CESSATION: ICD-10-PCS | Mod: S$GLB,,,

## 2023-08-10 PROCEDURE — 99406 BEHAV CHNG SMOKING 3-10 MIN: CPT | Mod: S$GLB,,,

## 2023-08-10 PROCEDURE — 94761 N-INVAS EAR/PLS OXIMETRY MLT: CPT

## 2023-08-10 PROCEDURE — 97530 THERAPEUTIC ACTIVITIES: CPT | Mod: CQ

## 2023-08-10 PROCEDURE — 99900035 HC TECH TIME PER 15 MIN (STAT)

## 2023-08-10 PROCEDURE — 25000003 PHARM REV CODE 250: Performed by: INTERNAL MEDICINE

## 2023-08-10 PROCEDURE — 97116 GAIT TRAINING THERAPY: CPT | Mod: CQ

## 2023-08-10 PROCEDURE — 97530 THERAPEUTIC ACTIVITIES: CPT | Mod: CO

## 2023-08-10 PROCEDURE — 36415 COLL VENOUS BLD VENIPUNCTURE: CPT | Performed by: INTERNAL MEDICINE

## 2023-08-10 RX ORDER — METOPROLOL SUCCINATE 25 MG/1
25 TABLET, EXTENDED RELEASE ORAL DAILY
Qty: 30 TABLET | Refills: 11 | Status: ON HOLD | OUTPATIENT
Start: 2023-08-11 | End: 2023-08-31 | Stop reason: SDUPTHER

## 2023-08-10 RX ORDER — CLOPIDOGREL BISULFATE 75 MG/1
75 TABLET ORAL DAILY
Qty: 30 TABLET | Refills: 11 | Status: SHIPPED | OUTPATIENT
Start: 2023-08-11 | End: 2023-09-12 | Stop reason: SDUPTHER

## 2023-08-10 RX ORDER — LEVETIRACETAM 500 MG/1
500 TABLET ORAL 2 TIMES DAILY
Qty: 60 TABLET | Refills: 11 | Status: SHIPPED | OUTPATIENT
Start: 2023-08-10 | End: 2024-08-09

## 2023-08-10 RX ADMIN — ASPIRIN 81 MG: 81 TABLET, COATED ORAL at 08:08

## 2023-08-10 RX ADMIN — LEVETIRACETAM 500 MG: 5 INJECTION INTRAVENOUS at 08:08

## 2023-08-10 RX ADMIN — CLOPIDOGREL BISULFATE 75 MG: 75 TABLET ORAL at 08:08

## 2023-08-10 RX ADMIN — NICOTINE 1 PATCH: 14 PATCH, EXTENDED RELEASE TRANSDERMAL at 08:08

## 2023-08-10 RX ADMIN — METOPROLOL SUCCINATE 12.5 MG: 25 TABLET, EXTENDED RELEASE ORAL at 08:08

## 2023-08-10 RX ADMIN — ATORVASTATIN CALCIUM 80 MG: 40 TABLET, FILM COATED ORAL at 08:08

## 2023-08-10 NOTE — PLAN OF CARE
Problem: Occupational Therapy  Goal: Occupational Therapy Goal  Description: Goals to be met by: 9/7/2023     Patient will increase functional independence with ADLs by performing:    UE Dressing with Modified [place].  LE Dressing with Set-up Assistance.  Grooming while standing with Set-up Assistance.  Toileting from toilet with Supervision for hygiene and clothing management.   Supine to sit with Modified [place].  Step transfer with Supervision & appropriate AD.  Toilet transfer to toilet with Supervision & appropriate AD.    Outcome: Ongoing, Progressing   Paresh Vance Jr. is a 57 y.o. male with a medical diagnosis of NSTEMI (non-ST elevated myocardial infarction).  Performance deficits affecting function are weakness, impaired endurance, impaired balance, visual deficits.    Pt found in bed, agreeable to therapy. Pt with reports of continued diplopia which occassionally causes LOB.  Otherwise,  pt is progressing well towards goals.  Continue OT services to address functional goals, progressing as able.

## 2023-08-10 NOTE — PROGRESS NOTES
[facility]                    Pharmacy       Discharge Medication Education    Patient ACCEPTED medication education. Pharmacy has provided education on the name, indication, and possible side effects of the medication(s) prescribed, using teach-back method.     The following medications have also been discussed, during this admission.        Medication List        START taking these medications      clopidogreL 75 mg tablet  Commonly known as: PLAVIX  Take 1 tablet (75 mg total) by mouth once daily.  Start taking on: August 11, 2023     levETIRAcetam 500 MG Tab  Commonly known as: KEPPRA  Take 1 tablet (500 mg total) by mouth 2 (two) times daily.     metoprolol succinate 25 MG 24 hr tablet  Commonly known as: TOPROL-XL  Take 1 tablet (25 mg total) by mouth once daily.  Start taking on: August 11, 2023     nicotine 21 mg/24 hr  Commonly known as: NICODERM CQ  Place 1 patch onto the skin once daily.            CONTINUE taking these medications      aspirin 81 MG Chew  Take 1 tablet (81 mg total) by mouth once daily.     atorvastatin 80 MG tablet  Commonly known as: LIPITOR  Take 1 tablet (80 mg total) by mouth once daily.     EScitalopram oxalate 10 MG tablet  Commonly known as: LEXAPRO  Take 1 tablet (10 mg total) by mouth every evening.     furosemide 40 MG tablet  Commonly known as: LASIX  Take 1 tablet (40 mg total) by mouth once daily.     hydrocortisone 2.5 % cream  Apply topically 2 (two) times daily.     losartan 25 MG tablet  Commonly known as: COZAAR  Take 1 tablet (25 mg total) by mouth once daily.            STOP taking these medications      hydrOXYzine pamoate 25 MG Cap  Commonly known as: VISTARIL               Where to Get Your Medications        These medications were sent to [facility] [address], [place] [address]      Hours: Mon-Fri, 8a-5:30p Phone: [phone]   clopidogreL 75 mg tablet  levETIRAcetam 500 MG Tab  metoprolol succinate 25 MG 24 hr  tablet          Thank you  Kayley Helms, PharmD  [phone]

## 2023-08-10 NOTE — PLAN OF CARE
Problem: Adult Inpatient Plan of Care  Goal: Plan of Care Review  Outcome: Ongoing, Progressing  Chart check complete. Vitals, orders, labs, and progress notes reviewed. Care plan updated. Will monitor.

## 2023-08-10 NOTE — PROGRESS NOTES
Individual Follow-Up Form    8/10/2023    Quit Date: To be determined    Clinical Status of Patient: Inpatient    Length of Service: 15 minutes    Comments: Smoking cessation education follow-up note: Reviewed details of pt's initial Ambulatory Smoking Cessation clinic appointment. No conflicts identified.     Diagnosis: F17.210    Next Visit: 8/23/23 at 11 o'clock am, [facility] [place] [facility] [facility]

## 2023-08-10 NOTE — PT/OT/SLP PROGRESS
Occupational Therapy   Treatment    Name: Paresh Vance Jr.  MRN: [id number]  Admitting Diagnosis:  NSTEMI (non-ST elevated myocardial infarction)  1 Day Post-Op    Recommendations:     Discharge Recommendations: Low Intensity Therapy Post Acute Care (spoke with evaluating IVET re: change in discharge disposition)  Discharge Equipment Recommendations:  walker, rolling  Barriers to discharge:  None    Assessment:     Paresh Vance Jr. is a 57 y.o. male with a medical diagnosis of NSTEMI (non-ST elevated myocardial infarction).  Performance deficits affecting function are weakness, impaired endurance, impaired balance, visual deficits.    Pt found in bed, agreeable to therapy. Pt with reports of continued diplopia which occassionally causes LOB.  Otherwise,  pt is progressing well towards goals.  Continue OT services to address functional goals, progressing as able.      Rehab Prognosis:  Good; patient would benefit from acute skilled OT services to address these deficits and reach maximum level of function.       Plan:     Patient to be seen 5 x/week to address the above listed problems via self-care/home management, therapeutic activities, therapeutic exercises  Plan of Care Expires: 09/07/23  Plan of Care Reviewed with: patient, daughter    Subjective     Chief Complaint: My double vision is my biggest problem.   Patient/Family Comments/goals: to go home  Pain/Comfort:  Pain Rating 1: 0/10  Pain Rating Post-Intervention 1: 0/10    Objective:     Communicated with: RN prior to session.  Patient found HOB elevated with bed alarm, peripheral IV, telemetry upon OT entry to room.    General Precautions: Standard, fall    Orthopedic Precautions:N/A  Braces: N/A  Respiratory Status: Room air     Occupational Performance:     Bed Mobility:    Patient completed Rolling/Turning to Right with modified independence  Patient completed Scooting/Bridging with modified independence  Patient completed Supine to Sit with modified  independence  Patient completed Sit to Supine with modified independence     Functional Mobility/Transfers:  Patient completed Sit <> Stand Transfer with modified independence  with  rolling walker   Functional Mobility: Pt ambulated extended distances, in preparation for ambulatory level ADL/IADL, at S/Mod I level using RW.  No LOB.  Steady pace.     Activities of Daily Living:  Lower Body Dressing: modified independence        [facility] 6 Click ADL: 23    Treatment & Education:  Education provided on home modifications secondary to vision impairment.     Patient left HOB elevated with all lines intact, call button in reach, and dtr present    GOALS:   Multidisciplinary Problems       Occupational Therapy Goals          Problem: Occupational Therapy    Goal Priority Disciplines Outcome Interventions   Occupational Therapy Goal     OT, PT/OT Ongoing, Progressing    Description: Goals to be met by: 9/7/2023     Patient will increase functional independence with ADLs by performing:    UE Dressing with Modified [place].  LE Dressing with Set-up Assistance.  Grooming while standing with Set-up Assistance.  Toileting from toilet with Supervision for hygiene and clothing management.   Supine to sit with Modified [place].  Step transfer with Supervision & appropriate AD.  Toilet transfer to toilet with Supervision & appropriate AD.                         Time Tracking:     OT Date of Treatment: 08/10/23  OT Start Time: 0945  OT Stop Time: 1001  OT Total Time (min): 16 min    Billable Minutes:Therapeutic Activity 16            8/10/2023

## 2023-08-10 NOTE — HOSPITAL COURSE
"Mr. Vance presented with encephalopathy and aphasia following seizure; found to have small stroke and NSTEMI.  Initially underwent stroke pathway with continuation of home aspirin, Plavix, atorvastatin and imaging.  MRI brain revealed small punctate lesion in right parietal, parasagittal, subcortical white matter at low risk for hemorrhagic conversion likely due to atheroemboli.  He underwent EEG which showed bilateral slowing and was started on Keppra which will be maintained at discharge.  Mentation has improved back towards baseline and aphasia is improving; suspect that stroke caused a seizure resulting in transient speech disturbance.  He was also found to have significant NSTEMI with troponin peaking at 17.  Discussion between Cardiology and Neurology; given small size of stroke was felt stable for initiation of anticoagulation.  He was placed on heparin drip and underwent left heart catheterization 8/9 which revealed triple-vessel disease.  He will follow-up as outpatient with Cardiothoracic surgery for revascularization.  Discharging on dual antiplatelet therapy, statin, beta-blocker.  He progressed significantly with PT/OT/SLP and will receive home health for continuation of therapy at discharge as well as vascular Neurology follow-up.    BP (!) 170/78 (Patient Position: Lying)   Pulse 75   Temp 96.8 °F (36 °C) (Oral)   Resp 18   Ht 6' 3" (1.905 m)   Wt 65.8 kg (145 lb)   SpO2 99%   BMI 18.12 kg/m²   Physical Exam  Vitals and nursing note reviewed.   Constitutional:       Appearance: Normal appearance. He is normal weight. He is not toxic-appearing.   HENT:      Head: Normocephalic and atraumatic.      Mouth/Throat:      Mouth: Mucous membranes are dry.   Eyes:      Extraocular Movements: Extraocular movements intact.      Pupils: Pupils are equal, round, and reactive to light.   Cardiovascular:      Rate and Rhythm: Normal rate and regular rhythm.      Pulses: Normal pulses.      Heart sounds: " Murmur heard.   Pulmonary:      Effort: Pulmonary effort is normal.      Breath sounds: No wheezing.   Abdominal:      General: Abdomen is flat. Bowel sounds are normal. There is no distension.      Palpations: Abdomen is soft. There is no mass.   Musculoskeletal:         General: No swelling.   Skin:     General: Skin is warm.      Capillary Refill: Capillary refill takes less than 2 seconds.   Neurological:      Mental Status: He is oriented to person, place, and time.   Psychiatric:         Behavior: Behavior normal.

## 2023-08-10 NOTE — PLAN OF CARE
Problem: Adult Inpatient Plan of Care  Goal: Plan of Care Review  Outcome: Ongoing, Progressing     Problem: Adult Inpatient Plan of Care  Goal: Optimal Comfort and Wellbeing  Outcome: Ongoing, Progressing     Problem: Swallowing Impairment (Stroke, Ischemic/Transient Ischemic Attack)  Goal: Optimal Eating and Swallowing without Aspiration  Outcome: Ongoing, Progressing     Plan of care reviewed. Medications administered as per order. Bed alarm on, wheels locked, call light within reach. Instructed to call for assistance.

## 2023-08-10 NOTE — PT/OT/SLP PROGRESS
Physical Therapy Treatment    Patient Name:  Paresh Vance Jr.   MRN:  [id number]    Recommendations:     Discharge Recommendations:  (High Intensity Therapy)  Discharge Equipment Recommendations:  (TBD at next level of care)  Barriers to discharge:  none    Assessment:     Paresh Vance Jr. is a 57 y.o. male admitted with a medical diagnosis of NSTEMI (non-ST elevated myocardial infarction).  He presents with the following impairments/functional limitations: weakness, impaired endurance, gait instability, impaired functional mobility, impaired balance, visual deficits Pt would continue to benefit from P.T. To address impairments listed above.  .    Rehab Prognosis: Fair; patient would benefit from acute skilled PT services to address these deficits and reach maximum level of function.    Recent Surgery: Procedure(s) (LRB):  INSERTION, CATHETER, RIGHT HEART (Right)  ANGIOGRAM, CORONARY ARTERY (N/A)  Instantaneous Wave-Free Ratio (IFR) (N/A) 1 Day Post-Op    Plan:     During this hospitalization, patient to be seen 6 x/week to address the identified rehab impairments via gait training, therapeutic activities, therapeutic exercises and progress toward the following goals:    Plan of Care Expires:  09/07/23    Subjective     Patient/Family Comments/goals: Pt agreed to tx.  Pain/Comfort:  Pain Rating 1: 0/10  Pain Rating Post-Intervention 1: 0/10      Objective:     Communicated with RN prior to session.  Patient found supine with bed alarm, peripheral IV, telemetry upon PT entry to room.     General Precautions: Standard, fall  Orthopedic Precautions: N/A  Braces: N/A  Respiratory Status: Room air     Functional Mobility:  Bed Mobility:     Rolling Right: modified independence  Scooting: modified independence  Supine to Sit: modified independence  Transfers:     Sit to Stand:  supervision with rolling walker and vc's for hand placement  Gait: 120ft with RW and S/SBA with vc's for upright posture.  Fairly steady gait  without LOB  Balance: sitting good, standing good, gait fair+/good-      AM-PAC 6 CLICK MOBILITY  Turning over in bed (including adjusting bedclothes, sheets and blankets)?: 4  Sitting down on and standing up from a chair with arms (e.g., wheelchair, bedside commode, etc.): 3  Moving from lying on back to sitting on the side of the bed?: 4  Moving to and from a bed to a chair (including a wheelchair)?: 3  Need to walk in hospital room?: 3  Climbing 3-5 steps with a railing?: 3  Basic Mobility Total Score: 20       Treatment & Education:  Pt's RW was delievered and pt's daughter had brought RW down to car secondary to pt discharging today.  PTA instructed pt and pt's daughter on how to properly adjust pt's RW for proper height when they get home.  PTA demonstrated by adjusting hospital RW pt would use for tx.  Gait training as above.  Seated LAQs with 3 sec hold 2 x 10 reps and standing marching in place with CGA/SBA x 15 reps without LOB.  Pt to sitting EOB with his daughter present at end of tx.    Patient left sitting edge of bed with all lines intact, call button in reach, and RN notified..    GOALS:   Multidisciplinary Problems       Physical Therapy Goals          Problem: Physical Therapy    Goal Priority Disciplines Outcome Goal Variances Interventions   Physical Therapy Goal     PT, PT/OT Ongoing, Progressing     Description: Goals to be met by: 23     Patient will increase functional independence with mobility by performin. Supine to sit with Stand-by Assistance  2. Sit to supine with Stand-by Assistance  3. Sit to stand transfer with Contact Guard Assistance  4. Bed to chair transfer with Contact Guard Assistance using LRAD.  5. Gait  x 50 feet with Contact Guard Assistance using LRAD.   6. Lower extremity exercise program x10 reps per handout, with independence                         Time Tracking:     PT Received On: 08/10/23  PT Start Time: 1123     PT Stop Time: 1146  PT Total Time (min):  23 min     Billable Minutes: Gait Training 13 and Therapeutic Activity 10    Treatment Type: Treatment  PT/PTA: PTA     Number of PTA visits since last PT visit: 2     08/10/2023

## 2023-08-10 NOTE — PLAN OF CARE
KWADWO met with pt and pt daughter Cyndi at bedside to discuss dc planning. Pt declined IPR, SW communicated with Amira with IPR, cancelled referral. Pt made aware that [facility] is [facility]  and may only receive HH services 2-3 times a wk, [facility]. [facility] , pt choice form completed and put in pt blue folder. RW deliviered to pt at bedside, pt signed for DME. Pt daughter Cyndi at bedside will provide pt transportation home. SW will continue to follow pt throughout his transitions of care and assist pt with any dc needs.    Pt cleared from CM standpoint. SW notified nurse and VN.    SW requested PCP follow up.  SW requested Cardi follow up.  SW requested Vascular Neurology follow up.       [facility] Referral [id number] for patient in [facility] [id number] A: Yes, willing to accept patient     Future Appointments   Date Time Provider Department Center   8/16/2023 10:40 AM Anil Giles MD [facility]   8/23/2023 11:00 AM Dru Tolliver CTTS [facility]          08/10/23 1010   Final Note   Assessment Type Final Discharge Note   Anticipated Discharge Disposition Home   Hospital Resources/Appts/Education Provided Appointments scheduled by Navigator/Coordinator   Post-Acute Status   Post-Acute Authorization Home Health   Discharge Delays None known at this time

## 2023-08-10 NOTE — DISCHARGE SUMMARY
"[facility] Medicine  Discharge Summary      Patient Name: Paresh Vance Jr.  MRN: [id number]  SHEILA: [id number]  Patient Class: IP- Inpatient  Admission Date: 8/7/2023  Hospital Length of Stay: 3 days  Discharge Date and Time:  08/10/2023 12:35 PM  Attending Physician: Abhinav Garcia.,*   Discharging Provider: Abhinav Garcia MD  Primary Care Provider: Gloria Shahid MD    Primary Care Team: Networked reference to record PCT     HPI:   58 yo male with a PMHx of HTN, HLD, 3v CAD, severe valvular disease (severe MR, mod AR, mod AS) here for AMS and seizure vs possible stroke.    History obtained from son and daughter as patient is altered. Son found his father unable to speak and eventually fell to the ground scrapping his elbow and nose. When he was able to get him up, he was altered and had trouble speaking prompting EMS arrival. Family says he does not drink EtOH or do any drugs except for marijuana. Has not had any significant medical issues, except for a NSTEM earlier this year.    At [facility], the patient was noted to have apparently an expressive aphasia prompting stroke code. CTH negative for ICH but did show a small, likely old lacunar infarct. He then developed a seizure, or "seizure like activity", in the CT scan. He received 3mg IV ativan and loaded with Keppra with resolution of symptoms and was post-ictal. Neurology recommending MRI and neuro consult.      Procedure(s) (LRB):  INSERTION, CATHETER, RIGHT HEART (Right)  ANGIOGRAM, CORONARY ARTERY (N/A)  Instantaneous Wave-Free Ratio (IFR) (N/A)      Hospital Course:   Mr. Vance presented with encephalopathy and aphasia following seizure; found to have small stroke and NSTEMI.  Initially underwent stroke pathway with continuation of home aspirin, Plavix, atorvastatin and imaging.  MRI brain revealed small punctate lesion in right parietal, parasagittal, subcortical white matter at low risk for hemorrhagic conversion " "likely due to atheroemboli.  He underwent EEG which showed bilateral slowing and was started on Keppra which will be maintained at discharge.  Mentation has improved back towards baseline and aphasia is improving; suspect that stroke caused a seizure resulting in transient speech disturbance.  He was also found to have significant NSTEMI with troponin peaking at 17.  Discussion between Cardiology and Neurology; given small size of stroke was felt stable for initiation of anticoagulation.  He was placed on heparin drip and underwent left heart catheterization 8/9 which revealed triple-vessel disease.  He will follow-up as outpatient with Cardiothoracic surgery for revascularization.  Discharging on dual antiplatelet therapy, statin, beta-blocker.  He progressed significantly with PT/OT/SLP and will receive home health for continuation of therapy at discharge as well as vascular Neurology follow-up.    BP (!) 170/78 (Patient Position: Lying)   Pulse 75   Temp 96.8 °F (36 °C) (Oral)   Resp 18   Ht 6' 3" (1.905 m)   Wt 65.8 kg (145 lb)   SpO2 99%   BMI 18.12 kg/m²   Physical Exam  Vitals and nursing note reviewed.   Constitutional:       Appearance: Normal appearance. He is normal weight. He is not toxic-appearing.   HENT:      Head: Normocephalic and atraumatic.      Mouth/Throat:      Mouth: Mucous membranes are dry.   Eyes:      Extraocular Movements: Extraocular movements intact.      Pupils: Pupils are equal, round, and reactive to light.   Cardiovascular:      Rate and Rhythm: Normal rate and regular rhythm.      Pulses: Normal pulses.      Heart sounds: Murmur heard.   Pulmonary:      Effort: Pulmonary effort is normal.      Breath sounds: No wheezing.   Abdominal:      General: Abdomen is flat. Bowel sounds are normal. There is no distension.      Palpations: Abdomen is soft. There is no mass.   Musculoskeletal:         General: No swelling.   Skin:     General: Skin is warm.      Capillary Refill: " Capillary refill takes less than 2 seconds.   Neurological:      Mental Status: He is oriented to person, place, and time.   Psychiatric:         Behavior: Behavior normal.        Goals of Care Treatment Preferences:  Code Status: Full Code      Consults:   Consults (From admission, onward)        Status Ordering Provider     Inpatient consult to Cardiology-[facility]  Once        Provider:  (Not yet assigned)    Completed AMIE MCKEON     Inpatient consult to Registered Dietitian/Nutritionist  Once        Provider:  (Not yet assigned)    Completed AMIE MCKEON     IP consult to case management/social work  Once        Provider:  (Not yet assigned)    Completed AMIE MCKEON     Inpatient consult to Neurology  Once        Provider:  (Not yet assigned)    Completed SO ELAM     IP consult to case management/social work  Once        Provider:  (Not yet assigned)    Completed SO ELAM          No new Assessment & Plan notes have been filed under this hospital service since the last note was generated.  Service: Hospital Medicine    Final Active Diagnoses:    Diagnosis Date Noted POA    PRINCIPAL PROBLEM:  NSTEMI (non-ST elevated myocardial infarction) [I21.4] 05/12/2023 Yes    Aphasia [R47.01] 08/09/2023 Yes    Cerebrovascular accident (CVA) due to thrombosis of right middle cerebral artery [I63.311] 08/09/2023 Yes    Acute stroke due to ischemia [I63.9] 08/07/2023 Yes    Valvular heart disease with severe MR, mod AR and AS [I38] 08/07/2023 Yes    New onset seizure [R56.9] 08/06/2023 Yes    Subclavian artery stenosis [I77.1] 06/05/2023 Yes    Essential hypertension [I10] 11/05/2019 Yes     Chronic    Coronary artery disease involving native coronary artery without angina pectoris [I25.10] 11/12/2015 Yes    Tobacco abuse [Z72.0] 09/17/2015 Yes     Chronic      Problems Resolved During this Admission:    Diagnosis Date Noted Date Resolved POA    Smoker [F17.200]  "11/12/2015 08/07/2023 Yes       Discharged Condition: fair    Disposition: Home or Self Care    Follow Up:   Follow-up Information     MyChart Follow up.    Why: Please check your MyChart for any future appointments scheduled.           Scheduling Navigators Follow up.    Why: Scheduling Navigators will contact pt with any future follow-up appointments scheduled.                     Patient Instructions:      WALKER FOR HOME USE     Order Specific Question Answer Comments   Type of Walker: Adult (5'4"-6'6")    With wheels? Yes    Height: 6' 3" (1.905 m)    Weight: 65.8 kg (145 lb)    Length of need (1-99 months): 99    Please check all that apply: Patient's condition impairs ambulation.    Please check all that apply: Patient needs help to get in and out of chair.      Ambulatory referral/consult to Cardiothoracic Surgery   Standing Status: Future   Referral Priority: Routine Referral Type: Consultation   Referral Reason: Specialty Services Required   Requested Specialty: Cardiothoracic Surgery   Number of Visits Requested: 1     Ambulatory referral/consult to Neurology   Standing Status: Future   Referral Priority: Routine Referral Type: Consultation   Referral Reason: Specialty Services Required   Requested Specialty: Neurology   Number of Visits Requested: 1     Ambulatory referral/consult to Vascular Neurology   Standing Status: Future   Referral Priority: Routine Referral Type: Consultation   Referral Reason: Specialty Services Required   Requested Specialty: Vascular Neurology   Number of Visits Requested: 1     Diet Cardiac   Order Comments: See Stroke Patient Education Guide Booklet for details.     Call 911 for any of the following:   Order Comments: Call 911  right away if any of the following warning signs come on suddenly, even if the symptoms only last for a few minutes. With stroke, timing is very important.   - Warning Signs of Stroke:  - Weakness: You may feel a sudden weakness, tingling or loss of " feeling on one side of your face or body.  - Vision Problems: You may have sudden double vision or trouble seeing in one or both eyes.  - Speech Problems: You may have sudden trouble talking, slured speech, or problems understanding others.  - Headache: You may have sudden, severe headache.  - Movement Problems: You may experience dizziness, a feeling of spinning, a loss of balance, a feeling of falling or blackouts.     Cardiac rehab phase II   Standing Status: Future Standing Exp. Date: 08/08/24     Order Specific Question Answer Comments   Department [facility] CARDIAC REHAB        Significant Diagnostic Studies: N/A    Pending Diagnostic Studies:     None         Medications:  Reconciled Home Medications:      Medication List      START taking these medications    clopidogreL 75 mg tablet  Commonly known as: PLAVIX  Take 1 tablet (75 mg total) by mouth once daily.  Start taking on: August 11, 2023     levETIRAcetam 500 MG Tab  Commonly known as: KEPPRA  Take 1 tablet (500 mg total) by mouth 2 (two) times daily.     metoprolol succinate 25 MG 24 hr tablet  Commonly known as: TOPROL-XL  Take 1 tablet (25 mg total) by mouth once daily.  Start taking on: August 11, 2023     nicotine 21 mg/24 hr  Commonly known as: NICODERM CQ  Place 1 patch onto the skin once daily.        CONTINUE taking these medications    aspirin 81 MG Chew  Take 1 tablet (81 mg total) by mouth once daily.     atorvastatin 80 MG tablet  Commonly known as: LIPITOR  Take 1 tablet (80 mg total) by mouth once daily.     EScitalopram oxalate 10 MG tablet  Commonly known as: LEXAPRO  Take 1 tablet (10 mg total) by mouth every evening.     furosemide 40 MG tablet  Commonly known as: LASIX  Take 1 tablet (40 mg total) by mouth once daily.     hydrocortisone 2.5 % cream  Apply topically 2 (two) times daily.     losartan 25 MG tablet  Commonly known as: COZAAR  Take 1 tablet (25 mg total) by mouth once daily.        STOP taking these medications    hydrOXYzine  pamoate 25 MG Cap  Commonly known as: VISTARIL            Indwelling Lines/Drains at time of discharge:   Lines/Drains/Airways     None                 Time spent on the discharge of patient: 36 minutes         Abhinav Garcia MD  Department of Hospital Medicine  [facility]

## 2023-08-10 NOTE — PLAN OF CARE
[facility] - Telemetry      HOME HEALTH ORDERS  FACE TO FACE ENCOUNTER    Patient Name: Paresh Vance Jr.  YOB: 1965    PCP: Gloria Shahid MD   PCP Address: [address] [place] / [place]  PCP Phone Number: [phone]  PCP Fax: [phone]    Encounter Date: 8/7/23    Admit to Home Health    Diagnoses:  Active Hospital Problems    Diagnosis  POA    *NSTEMI (non-ST elevated myocardial infarction) [I21.4]  Yes    Aphasia [R47.01]  Yes    Cerebrovascular accident (CVA) due to thrombosis of right middle cerebral artery [I63.311]  Yes    Acute stroke due to ischemia [I63.9]  Yes    Valvular heart disease with severe MR, mod AR and AS [I38]  Yes    New onset seizure [R56.9]  Yes    Subclavian artery stenosis [I77.1]  Yes    Essential hypertension [I10]  Yes     Chronic    Coronary artery disease involving native coronary artery without angina pectoris [I25.10]  Yes    Tobacco abuse [Z72.0]  Yes     Chronic      Resolved Hospital Problems    Diagnosis Date Resolved POA    Smoker [F17.200] 08/07/2023 Yes       Follow Up Appointments:  Future Appointments   Date Time Provider Department Center   8/16/2023 10:40 AM Anil Giles MD [facility]       Allergies:Review of patient's allergies indicates:  No Known Allergies    Medications: Review discharge medications with patient and family and provide education.    Current Facility-Administered Medications   Medication Dose Route Frequency Provider Last Rate Last Admin    0.9%  NaCl infusion   Intravenous PRN Abhinav Garcia MD 20 mL/hr at 08/07/23 2108 New Bag at 08/07/23 2108    aspirin EC tablet 81 mg  81 mg Oral Daily Reese Rooney MD   81 mg at 08/10/23 0810    atorvastatin tablet 80 mg  80 mg Oral Daily Reese Rooney MD   80 mg at 08/10/23 0810    clopidogreL tablet 75 mg  75 mg Oral Daily Reese Rooney MD   75 mg at 08/10/23 0810    EScitalopram oxalate tablet 10 mg  10 mg Oral QHS Reese Rooney  MD ARLINE   10 mg at 08/09/23 2019    heparin infusion 1,000 units/500 ml in 0.9% NaCl (pressure line flush)    Continuous PRN Mao Hernandez III,  mL/hr at 08/09/23 1216 1,500 Units/hr at 08/09/23 1216    labetaloL injection 10 mg  10 mg Intravenous Q15 Min PRN Reese Rooney MD        levETIRAcetam in NaCl (iso-os) IVPB 500 mg  500 mg Intravenous Q12H Reese Rooney  mL/hr at 08/10/23 0814 500 mg at 08/10/23 0814    metoprolol succinate (TOPROL-XL) 24 hr split tablet 12.5 mg  12.5 mg Oral Daily Abhinav Garcia MD   12.5 mg at 08/10/23 0810    nicotine 14 mg/24 hr 1 patch  1 patch Transdermal Daily Abhinav Garcia MD   1 patch at 08/10/23 0811    ondansetron disintegrating tablet 8 mg  8 mg Oral Q8H PRN Reese Rooney MD        ondansetron injection 4 mg  4 mg Intravenous Q6H PRN Abhinav Garcia MD   4 mg at 08/07/23 1841    sodium chloride 0.9% bolus 500 mL 500 mL  500 mL Intravenous Continuous PRN Abhinav Garcia MD        sodium chloride 0.9% flush 10 mL  10 mL Intravenous PRN Reese Rooney MD        sodium chloride 0.9% flush 10 mL  10 mL Intravenous PRN Abhinav Garcia MD         Current Discharge Medication List        START taking these medications    Details   clopidogreL (PLAVIX) 75 mg tablet Take 1 tablet (75 mg total) by mouth once daily.  Qty: 30 tablet, Refills: 11      levETIRAcetam (KEPPRA) 500 MG Tab Take 1 tablet (500 mg total) by mouth 2 (two) times daily.  Qty: 60 tablet, Refills: 11      metoprolol succinate (TOPROL-XL) 25 MG 24 hr tablet Take 1 tablet (25 mg total) by mouth once daily.  Qty: 30 tablet, Refills: 11    Comments: .           CONTINUE these medications which have NOT CHANGED    Details   aspirin 81 MG Chew Take 1 tablet (81 mg total) by mouth once daily.  Qty: 90 tablet, Refills: 3      atorvastatin (LIPITOR) 80 MG tablet Take 1 tablet (80 mg total) by mouth once daily.  Qty: 90 tablet, Refills: 3       furosemide (LASIX) 40 MG tablet Take 1 tablet (40 mg total) by mouth once daily.  Qty: 30 tablet, Refills: 0      hydrocortisone 2.5 % cream Apply topically 2 (two) times daily.  Qty: 60 g, Refills: 0      losartan (COZAAR) 25 MG tablet Take 1 tablet (25 mg total) by mouth once daily.  Qty: 90 tablet, Refills: 3    Comments: .      EScitalopram oxalate (LEXAPRO) 10 MG tablet Take 1 tablet (10 mg total) by mouth every evening.  Qty: 90 tablet, Refills: 0    Associated Diagnoses: Current mild episode of major depressive disorder without prior episode      nicotine (NICODERM CQ) 21 mg/24 hr Place 1 patch onto the skin once daily.  Qty: 28 patch, Refills: 0           STOP taking these medications       hydrOXYzine pamoate (VISTARIL) 25 MG Cap Comments:   Reason for Stopping:                 I have seen and examined this patient within the last 30 days. My clinical findings that support the need for the home health skilled services and home bound status are the following:no   Weakness/numbness causing balance and gait disturbance due to Stroke making it taxing to leave home.     Diet:   cardiac diet    Labs:  SN to perform labs:  BMP: Weekly; 1 week(s) and Report Lab results to PCP.    Referrals/ Consults  Physical Therapy to evaluate and treat. Evaluate for home safety and equipment needs; Establish/upgrade home exercise program. Perform / instruct on therapeutic exercises, gait training, transfer training, and Range of Motion.  Occupational Therapy to evaluate and treat. Evaluate home environment for safety and equipment needs. Perform/Instruct on transfers, ADL training, ROM, and therapeutic exercises.  Speech Therapy  to evaluate and treat for  Language, Swallowing, and Cognition.   to evaluate for community resources/long-range planning.  Aide to provide assistance with personal care, ADLs, and vital signs.    Activities:   activity as tolerated    Nursing:   Agency to admit patient within 24 hours of  hospital discharge unless specified on physician order or at patient request    SN to complete comprehensive assessment including routine vital signs. Instruct on disease process and s/s of complications to report to MD. Review/verify medication list sent home with the patient at time of discharge  and instruct patient/caregiver as needed. Frequency may be adjusted depending on start of care date.     Skilled nurse to perform up to 3 visits PRN for symptoms related to diagnosis    Notify MD if SBP > 160 or < 90; DBP > 90 or < 50; HR > 120 or < 50; Temp > 101; O2 < 88%; Other:       Ok to schedule additional visits based on staff availability and patient request on consecutive days within the home health episode.    When multiple disciplines ordered:    Start of Care occurs on Sunday - Wednesday schedule remaining discipline evaluations as ordered on separate consecutive days following the start of care.    Thursday SOC -schedule subsequent evaluations Friday and Monday the following week.     Friday - Saturday SOC - schedule subsequent discipline evaluations on consecutive days starting Monday of the following week.    For all post-discharge communication and subsequent orders please contact patient's primary care physician. If unable to reach primary care physician or do not receive response within 30 minutes, please contact Binta Traore for clinical staff order clarification    Miscellaneous   Routine Skin for Bedridden Patients: Instruct patient/caregiver to apply moisture barrier cream to all skin folds and wet areas in perineal area daily and after baths and all bowel movements.    Home Health Aide:  Nursing Weekly, Physical Therapy Three times weekly, Occupational Therapy Three times weekly, Speech Language Pathology Three times weekly, Medical Social Work Weekly, and Home Health Aide Twice weekly    Wound Care Orders  no    I certify that this patient is confined to his home and needs intermittent skilled  nursing care, physical therapy, speech therapy, and occupational therapy.

## 2023-08-10 NOTE — PLAN OF CARE
VN reviewed discharge instructions with pt. And daughter.Using teach back method.  AVS printed and handed to pt by bedside nurse.  Reviewed follow-up appointments, medications, diet, and importance of medication compliance.  Reviewed home care instructions, treatment plan, self-management, and when to seek medical attention.  Allowed time for questions.  All questions answered.  Patient and daughter verbalized complete understanding of discharge instructions and voices no concerns.     Discharge instructions complete.  Bedside delivery done.  Transport/wheelchair requested.  Bedside nurse notified.

## 2023-08-11 ENCOUNTER — PATIENT OUTREACH (OUTPATIENT)
Dept: ADMINISTRATIVE | Facility: CLINIC | Age: 58
End: 2023-08-11
Payer: MEDICAID

## 2023-08-11 NOTE — PROGRESS NOTES
C3 nurse attempted to contact Paresh Vance Jr. for a TCC post hospital discharge follow up call. No answer. Left voicemail with callback information. The patient does not have a scheduled HOSFU appointment. Message sent to PCP staff for assistance with scheduling visit with patient.

## 2023-08-11 NOTE — PROGRESS NOTES
"C3 nurse spoke with Paresh Vance Jr. for a TCC post hospital discharge follow up call. The patient has a scheduled HOSFU appointment with Gloria Shahid MD on 08/14/23 "around 10am"         "

## 2023-08-15 ENCOUNTER — TELEPHONE (OUTPATIENT)
Dept: CARDIOTHORACIC SURGERY | Facility: CLINIC | Age: 58
End: 2023-08-15
Payer: MEDICAID
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
## 2023-08-17 PROCEDURE — G0180 MD CERTIFICATION HHA PATIENT: HCPCS | Mod: ,,, | Performed by: HOSPITALIST

## 2023-08-17 PROCEDURE — G0180 PR HOME HEALTH MD CERTIFICATION: ICD-10-PCS | Mod: ,,, | Performed by: HOSPITALIST

## 2023-08-29 ENCOUNTER — HOSPITAL ENCOUNTER (INPATIENT)
Facility: HOSPITAL | Age: 58
LOS: 2 days | Discharge: HOME | DRG: 291 | End: 2023-08-31
Attending: STUDENT IN AN ORGANIZED HEALTH CARE EDUCATION/TRAINING PROGRAM | Admitting: INTERNAL MEDICINE
Payer: MEDICAID

## 2023-08-29 DIAGNOSIS — I50.9 CHF (CONGESTIVE HEART FAILURE): ICD-10-CM

## 2023-08-29 DIAGNOSIS — E87.6 HYPOKALEMIA: ICD-10-CM

## 2023-08-29 DIAGNOSIS — E83.42 HYPOMAGNESEMIA: ICD-10-CM

## 2023-08-29 DIAGNOSIS — R07.9 CHEST PAIN: ICD-10-CM

## 2023-08-29 DIAGNOSIS — E87.70 HYPERVOLEMIA, UNSPECIFIED HYPERVOLEMIA TYPE: ICD-10-CM

## 2023-08-29 DIAGNOSIS — R06.02 SHORTNESS OF BREATH: Primary | ICD-10-CM

## 2023-08-29 DIAGNOSIS — I25.10 CORONARY ARTERY DISEASE, UNSPECIFIED VESSEL OR LESION TYPE, UNSPECIFIED WHETHER ANGINA PRESENT, UNSPECIFIED WHETHER NATIVE OR TRANSPLANTED HEART: ICD-10-CM

## 2023-08-29 DIAGNOSIS — I35.0 AORTIC VALVE STENOSIS, ETIOLOGY OF CARDIAC VALVE DISEASE UNSPECIFIED: ICD-10-CM

## 2023-08-29 DIAGNOSIS — I50.9 ACUTE ON CHRONIC CONGESTIVE HEART FAILURE, UNSPECIFIED HEART FAILURE TYPE: ICD-10-CM

## 2023-08-29 DIAGNOSIS — I25.110 CORONARY ARTERY DISEASE INVOLVING NATIVE CORONARY ARTERY OF NATIVE HEART WITH UNSTABLE ANGINA PECTORIS: ICD-10-CM

## 2023-08-29 PROBLEM — I50.33 ACUTE ON CHRONIC DIASTOLIC HEART FAILURE: Status: ACTIVE | Noted: 2023-08-29

## 2023-08-29 PROBLEM — D64.9 ANEMIA: Status: ACTIVE | Noted: 2023-08-29

## 2023-08-29 LAB
ALBUMIN SERPL BCP-MCNC: 2.8 G/DL (ref 3.5–5.2)
ALP SERPL-CCNC: 72 U/L (ref 55–135)
ALT SERPL W/O P-5'-P-CCNC: 8 U/L (ref 10–44)
ANION GAP SERPL CALC-SCNC: 8 MMOL/L (ref 8–16)
AST SERPL-CCNC: 12 U/L (ref 10–40)
BASOPHILS # BLD AUTO: 0.05 K/UL (ref 0–0.2)
BASOPHILS NFR BLD: 0.7 % (ref 0–1.9)
BILIRUB SERPL-MCNC: 1.4 MG/DL (ref 0.1–1)
BNP SERPL-MCNC: 2619 PG/ML (ref 0–99)
BUN SERPL-MCNC: 5 MG/DL (ref 6–30)
BUN SERPL-MCNC: 6 MG/DL (ref 6–20)
CALCIUM SERPL-MCNC: 7.8 MG/DL (ref 8.7–10.5)
CHLORIDE SERPL-SCNC: 106 MMOL/L (ref 95–110)
CHLORIDE SERPL-SCNC: 98 MMOL/L (ref 95–110)
CO2 SERPL-SCNC: 22 MMOL/L (ref 23–29)
CREAT SERPL-MCNC: 0.7 MG/DL (ref 0.5–1.4)
CREAT SERPL-MCNC: 0.8 MG/DL (ref 0.5–1.4)
DIFFERENTIAL METHOD: ABNORMAL
EOSINOPHIL # BLD AUTO: 0 K/UL (ref 0–0.5)
EOSINOPHIL NFR BLD: 0.1 % (ref 0–8)
ERYTHROCYTE [DISTWIDTH] IN BLOOD BY AUTOMATED COUNT: 17.1 % (ref 11.5–14.5)
EST. GFR  (NO RACE VARIABLE): >60 ML/MIN/1.73 M^2
ESTIMATED AVG GLUCOSE: 103 MG/DL (ref 68–131)
GLUCOSE SERPL-MCNC: 86 MG/DL (ref 70–110)
GLUCOSE SERPL-MCNC: 91 MG/DL (ref 70–110)
HBA1C MFR BLD: 5.2 % (ref 4–5.6)
HCT VFR BLD AUTO: 32.2 % (ref 40–54)
HCT VFR BLD CALC: 35 %PCV (ref 36–54)
HCV AB SERPL QL IA: NORMAL
HGB BLD-MCNC: 10.8 G/DL (ref 14–18)
HIV 1+2 AB+HIV1 P24 AG SERPL QL IA: NORMAL
IMM GRANULOCYTES # BLD AUTO: 0.01 K/UL (ref 0–0.04)
IMM GRANULOCYTES NFR BLD AUTO: 0.1 % (ref 0–0.5)
LIPASE SERPL-CCNC: 4 U/L (ref 4–60)
LYMPHOCYTES # BLD AUTO: 1.4 K/UL (ref 1–4.8)
LYMPHOCYTES NFR BLD: 19.4 % (ref 18–48)
MAGNESIUM SERPL-MCNC: 1.2 MG/DL (ref 1.6–2.6)
MCH RBC QN AUTO: 33 PG (ref 27–31)
MCHC RBC AUTO-ENTMCNC: 33.5 G/DL (ref 32–36)
MCV RBC AUTO: 99 FL (ref 82–98)
MONOCYTES # BLD AUTO: 0.4 K/UL (ref 0.3–1)
MONOCYTES NFR BLD: 5 % (ref 4–15)
NEUTROPHILS # BLD AUTO: 5.4 K/UL (ref 1.8–7.7)
NEUTROPHILS NFR BLD: 74.7 % (ref 38–73)
NRBC BLD-RTO: 0 /100 WBC
PLATELET # BLD AUTO: 158 K/UL (ref 150–450)
PMV BLD AUTO: 12.7 FL (ref 9.2–12.9)
POC CARDIAC TROPONIN I: 0.04 NG/ML (ref 0–0.08)
POC CARDIAC TROPONIN I: 0.04 NG/ML (ref 0–0.08)
POC IONIZED CALCIUM: 1.06 MMOL/L (ref 1.06–1.42)
POC TCO2 (MEASURED): 30 MMOL/L (ref 23–29)
POTASSIUM BLD-SCNC: 3.3 MMOL/L (ref 3.5–5.1)
POTASSIUM SERPL-SCNC: 2.7 MMOL/L (ref 3.5–5.1)
POTASSIUM SERPL-SCNC: 3.7 MMOL/L (ref 3.5–5.1)
PROT SERPL-MCNC: 4.9 G/DL (ref 6–8.4)
RBC # BLD AUTO: 3.27 M/UL (ref 4.6–6.2)
SAMPLE: ABNORMAL
SAMPLE: NORMAL
SAMPLE: NORMAL
SARS-COV-2 RNA RESP QL NAA+PROBE: NOT DETECTED
SODIUM BLD-SCNC: 141 MMOL/L (ref 136–145)
SODIUM SERPL-SCNC: 136 MMOL/L (ref 136–145)
TROPONIN I SERPL DL<=0.01 NG/ML-MCNC: 0.07 NG/ML (ref 0–0.03)
TROPONIN I SERPL DL<=0.01 NG/ML-MCNC: 0.08 NG/ML (ref 0–0.03)
TROPONIN I SERPL DL<=0.01 NG/ML-MCNC: 0.09 NG/ML (ref 0–0.03)
WBC # BLD AUTO: 7.17 K/UL (ref 3.9–12.7)

## 2023-08-29 PROCEDURE — 27000221 HC OXYGEN, UP TO 24 HOURS

## 2023-08-29 PROCEDURE — 93010 ELECTROCARDIOGRAM REPORT: CPT | Mod: ,,, | Performed by: INTERNAL MEDICINE

## 2023-08-29 PROCEDURE — 99291 CRITICAL CARE FIRST HOUR: CPT

## 2023-08-29 PROCEDURE — 99900035 HC TECH TIME PER 15 MIN (STAT)

## 2023-08-29 PROCEDURE — 96365 THER/PROPH/DIAG IV INF INIT: CPT

## 2023-08-29 PROCEDURE — 80177 DRUG SCRN QUAN LEVETIRACETAM: CPT

## 2023-08-29 PROCEDURE — G0378 HOSPITAL OBSERVATION PER HR: HCPCS

## 2023-08-29 PROCEDURE — 85025 COMPLETE CBC W/AUTO DIFF WBC: CPT

## 2023-08-29 PROCEDURE — 96375 TX/PRO/DX INJ NEW DRUG ADDON: CPT

## 2023-08-29 PROCEDURE — 84484 ASSAY OF TROPONIN QUANT: CPT

## 2023-08-29 PROCEDURE — 83036 HEMOGLOBIN GLYCOSYLATED A1C: CPT

## 2023-08-29 PROCEDURE — 99223 PR INITIAL HOSPITAL CARE,LEVL III: ICD-10-PCS | Mod: ,,,

## 2023-08-29 PROCEDURE — 63600175 PHARM REV CODE 636 W HCPCS

## 2023-08-29 PROCEDURE — 11000001 HC ACUTE MED/SURG PRIVATE ROOM

## 2023-08-29 PROCEDURE — 94761 N-INVAS EAR/PLS OXIMETRY MLT: CPT

## 2023-08-29 PROCEDURE — 84484 ASSAY OF TROPONIN QUANT: CPT | Mod: 91

## 2023-08-29 PROCEDURE — 86803 HEPATITIS C AB TEST: CPT | Performed by: PHYSICIAN ASSISTANT

## 2023-08-29 PROCEDURE — 83690 ASSAY OF LIPASE: CPT

## 2023-08-29 PROCEDURE — 93005 ELECTROCARDIOGRAM TRACING: CPT

## 2023-08-29 PROCEDURE — 83735 ASSAY OF MAGNESIUM: CPT | Performed by: PHYSICIAN ASSISTANT

## 2023-08-29 PROCEDURE — 25000003 PHARM REV CODE 250: Performed by: STUDENT IN AN ORGANIZED HEALTH CARE EDUCATION/TRAINING PROGRAM

## 2023-08-29 PROCEDURE — 99223 1ST HOSP IP/OBS HIGH 75: CPT | Mod: ,,,

## 2023-08-29 PROCEDURE — 87635 SARS-COV-2 COVID-19 AMP PRB: CPT

## 2023-08-29 PROCEDURE — 87389 HIV-1 AG W/HIV-1&-2 AB AG IA: CPT | Performed by: PHYSICIAN ASSISTANT

## 2023-08-29 PROCEDURE — 93010 EKG 12-LEAD: ICD-10-PCS | Mod: ,,, | Performed by: INTERNAL MEDICINE

## 2023-08-29 PROCEDURE — 83880 ASSAY OF NATRIURETIC PEPTIDE: CPT

## 2023-08-29 PROCEDURE — 84132 ASSAY OF SERUM POTASSIUM: CPT

## 2023-08-29 PROCEDURE — 80053 COMPREHEN METABOLIC PANEL: CPT

## 2023-08-29 RX ORDER — PROCHLORPERAZINE EDISYLATE 5 MG/ML
5 INJECTION INTRAMUSCULAR; INTRAVENOUS EVERY 6 HOURS PRN
Status: DISCONTINUED | OUTPATIENT
Start: 2023-08-29 | End: 2023-08-31 | Stop reason: HOSPADM

## 2023-08-29 RX ORDER — IBUPROFEN 200 MG
1 TABLET ORAL DAILY
Status: DISCONTINUED | OUTPATIENT
Start: 2023-08-30 | End: 2023-08-31 | Stop reason: HOSPADM

## 2023-08-29 RX ORDER — ESCITALOPRAM OXALATE 10 MG/1
10 TABLET ORAL NIGHTLY
Status: DISCONTINUED | OUTPATIENT
Start: 2023-08-29 | End: 2023-08-31 | Stop reason: HOSPADM

## 2023-08-29 RX ORDER — ACETAMINOPHEN 325 MG/1
650 TABLET ORAL EVERY 4 HOURS PRN
Status: DISCONTINUED | OUTPATIENT
Start: 2023-08-29 | End: 2023-08-31 | Stop reason: HOSPADM

## 2023-08-29 RX ORDER — FUROSEMIDE 10 MG/ML
80 INJECTION INTRAMUSCULAR; INTRAVENOUS
Status: COMPLETED | OUTPATIENT
Start: 2023-08-29 | End: 2023-08-29

## 2023-08-29 RX ORDER — SODIUM CHLORIDE 0.9 % (FLUSH) 0.9 %
10 SYRINGE (ML) INJECTION EVERY 12 HOURS PRN
Status: DISCONTINUED | OUTPATIENT
Start: 2023-08-29 | End: 2023-08-31 | Stop reason: HOSPADM

## 2023-08-29 RX ORDER — ASPIRIN 325 MG
325 TABLET ORAL
Status: DISCONTINUED | OUTPATIENT
Start: 2023-08-29 | End: 2023-08-29

## 2023-08-29 RX ORDER — CLOPIDOGREL BISULFATE 75 MG/1
75 TABLET ORAL DAILY
Status: DISCONTINUED | OUTPATIENT
Start: 2023-08-29 | End: 2023-08-31 | Stop reason: HOSPADM

## 2023-08-29 RX ORDER — BISACODYL 10 MG
10 SUPPOSITORY, RECTAL RECTAL DAILY PRN
Status: DISCONTINUED | OUTPATIENT
Start: 2023-08-29 | End: 2023-08-31 | Stop reason: HOSPADM

## 2023-08-29 RX ORDER — HYDROCODONE BITARTRATE AND ACETAMINOPHEN 5; 325 MG/1; MG/1
1 TABLET ORAL EVERY 6 HOURS PRN
Status: DISCONTINUED | OUTPATIENT
Start: 2023-08-29 | End: 2023-08-30

## 2023-08-29 RX ORDER — TALC
6 POWDER (GRAM) TOPICAL NIGHTLY PRN
Status: DISCONTINUED | OUTPATIENT
Start: 2023-08-29 | End: 2023-08-31 | Stop reason: HOSPADM

## 2023-08-29 RX ORDER — POLYETHYLENE GLYCOL 3350 17 G/17G
17 POWDER, FOR SOLUTION ORAL 2 TIMES DAILY PRN
Status: DISCONTINUED | OUTPATIENT
Start: 2023-08-29 | End: 2023-08-31 | Stop reason: HOSPADM

## 2023-08-29 RX ORDER — POTASSIUM CHLORIDE 7.45 MG/ML
10 INJECTION INTRAVENOUS
Status: DISCONTINUED | OUTPATIENT
Start: 2023-08-29 | End: 2023-08-29

## 2023-08-29 RX ORDER — SODIUM CHLORIDE 0.9 % (FLUSH) 0.9 %
10 SYRINGE (ML) INJECTION
Status: DISCONTINUED | OUTPATIENT
Start: 2023-08-29 | End: 2023-08-31 | Stop reason: HOSPADM

## 2023-08-29 RX ORDER — SIMETHICONE 80 MG
1 TABLET,CHEWABLE ORAL 4 TIMES DAILY PRN
Status: DISCONTINUED | OUTPATIENT
Start: 2023-08-29 | End: 2023-08-31 | Stop reason: HOSPADM

## 2023-08-29 RX ORDER — IBUPROFEN 200 MG
16 TABLET ORAL
Status: DISCONTINUED | OUTPATIENT
Start: 2023-08-29 | End: 2023-08-31 | Stop reason: HOSPADM

## 2023-08-29 RX ORDER — LEVETIRACETAM 500 MG/1
500 TABLET ORAL 2 TIMES DAILY
Status: DISCONTINUED | OUTPATIENT
Start: 2023-08-29 | End: 2023-08-31 | Stop reason: HOSPADM

## 2023-08-29 RX ORDER — LOSARTAN POTASSIUM 25 MG/1
25 TABLET ORAL DAILY
Status: ON HOLD | COMMUNITY
End: 2023-08-31 | Stop reason: HOSPADM

## 2023-08-29 RX ORDER — FUROSEMIDE 10 MG/ML
40 INJECTION INTRAMUSCULAR; INTRAVENOUS
Status: DISCONTINUED | OUTPATIENT
Start: 2023-08-29 | End: 2023-08-30

## 2023-08-29 RX ORDER — NAPROXEN SODIUM 220 MG/1
81 TABLET, FILM COATED ORAL DAILY
Status: DISCONTINUED | OUTPATIENT
Start: 2023-08-29 | End: 2023-08-31 | Stop reason: HOSPADM

## 2023-08-29 RX ORDER — NALOXONE HCL 0.4 MG/ML
0.02 VIAL (ML) INJECTION
Status: DISCONTINUED | OUTPATIENT
Start: 2023-08-29 | End: 2023-08-31 | Stop reason: HOSPADM

## 2023-08-29 RX ORDER — POTASSIUM CHLORIDE 7.45 MG/ML
10 INJECTION INTRAVENOUS
Status: COMPLETED | OUTPATIENT
Start: 2023-08-29 | End: 2023-08-29

## 2023-08-29 RX ORDER — GLUCAGON 1 MG
1 KIT INJECTION
Status: DISCONTINUED | OUTPATIENT
Start: 2023-08-29 | End: 2023-08-31 | Stop reason: HOSPADM

## 2023-08-29 RX ORDER — HYDROCODONE BITARTRATE AND ACETAMINOPHEN 10; 325 MG/1; MG/1
1 TABLET ORAL EVERY 6 HOURS PRN
Status: DISCONTINUED | OUTPATIENT
Start: 2023-08-29 | End: 2023-08-30

## 2023-08-29 RX ORDER — MAGNESIUM SULFATE HEPTAHYDRATE 40 MG/ML
2 INJECTION, SOLUTION INTRAVENOUS ONCE
Status: COMPLETED | OUTPATIENT
Start: 2023-08-29 | End: 2023-08-29

## 2023-08-29 RX ORDER — LISINOPRIL 10 MG/1
10 TABLET ORAL DAILY
Status: DISCONTINUED | OUTPATIENT
Start: 2023-08-29 | End: 2023-08-31 | Stop reason: HOSPADM

## 2023-08-29 RX ORDER — MAGNESIUM SULFATE HEPTAHYDRATE 40 MG/ML
2 INJECTION, SOLUTION INTRAVENOUS ONCE
Status: DISCONTINUED | OUTPATIENT
Start: 2023-08-29 | End: 2023-08-29

## 2023-08-29 RX ORDER — MAG HYDROX/ALUMINUM HYD/SIMETH 200-200-20
30 SUSPENSION, ORAL (FINAL DOSE FORM) ORAL 4 TIMES DAILY PRN
Status: DISCONTINUED | OUTPATIENT
Start: 2023-08-29 | End: 2023-08-31 | Stop reason: HOSPADM

## 2023-08-29 RX ORDER — ONDANSETRON 4 MG/1
8 TABLET, ORALLY DISINTEGRATING ORAL EVERY 8 HOURS PRN
Status: DISCONTINUED | OUTPATIENT
Start: 2023-08-29 | End: 2023-08-31 | Stop reason: HOSPADM

## 2023-08-29 RX ORDER — IBUPROFEN 200 MG
1 TABLET ORAL DAILY
Status: DISCONTINUED | OUTPATIENT
Start: 2023-08-29 | End: 2023-08-29

## 2023-08-29 RX ORDER — ATORVASTATIN CALCIUM 40 MG/1
80 TABLET, FILM COATED ORAL DAILY
Status: DISCONTINUED | OUTPATIENT
Start: 2023-08-29 | End: 2023-08-31 | Stop reason: HOSPADM

## 2023-08-29 RX ORDER — IBUPROFEN 200 MG
24 TABLET ORAL
Status: DISCONTINUED | OUTPATIENT
Start: 2023-08-29 | End: 2023-08-31 | Stop reason: HOSPADM

## 2023-08-29 RX ORDER — METOPROLOL SUCCINATE 25 MG/1
25 TABLET, EXTENDED RELEASE ORAL DAILY
Status: DISCONTINUED | OUTPATIENT
Start: 2023-08-29 | End: 2023-08-30

## 2023-08-29 RX ADMIN — MAGNESIUM SULFATE 2 G: 2 INJECTION INTRAVENOUS at 04:08

## 2023-08-29 RX ADMIN — POTASSIUM CHLORIDE 10 MEQ: 7.46 INJECTION, SOLUTION INTRAVENOUS at 04:08

## 2023-08-29 RX ADMIN — FUROSEMIDE 80 MG: 10 INJECTION, SOLUTION INTRAVENOUS at 12:08

## 2023-08-29 RX ADMIN — POTASSIUM CHLORIDE 10 MEQ: 7.46 INJECTION, SOLUTION INTRAVENOUS at 12:08

## 2023-08-29 RX ADMIN — POTASSIUM CHLORIDE 10 MEQ: 7.46 INJECTION, SOLUTION INTRAVENOUS at 03:08

## 2023-08-29 RX ADMIN — POTASSIUM BICARBONATE 60 MEQ: 391 TABLET, EFFERVESCENT ORAL at 01:08

## 2023-08-29 NOTE — ASSESSMENT & PLAN NOTE
- Tn 0.074 (decreased from previous), will continue to trend  - BNP 2619  - EKG showed NSR and LVH  - CXR showed pulmonary edema, pneumonia, aspiration or sepsis  - Replace electrolytes, keep Mag > 2 and K >4  - STAT trop and ekg if chest pain occurs  - continue tele

## 2023-08-29 NOTE — SUBJECTIVE & OBJECTIVE
Past Medical History:   Diagnosis Date    Basal cell carcinoma (BCC) of upper lip 6/20/2021    Cerebrovascular accident (CVA) due to thrombosis of right middle cerebral artery 8/9/2023    Coronary artery disease involving native coronary artery without angina pectoris 11/12/2015    Hyperlipidemia 11/12/2015    Hypertension     Syncope and collapse 6/29/2023       Past Surgical History:   Procedure Laterality Date    CORONARY ANGIOGRAPHY  2015    CORONARY ANGIOGRAPHY Right 5/12/2023    Procedure: ANGIOGRAM, CORONARY ARTERY;  Surgeon: Ryan Huertas MD;  Location: [facility] CATH LAB;  Service: Cardiology;  Laterality: Right;    CORONARY ANGIOGRAPHY N/A 8/9/2023    Procedure: ANGIOGRAM, CORONARY ARTERY;  Surgeon: Mao Hernandez III, MD;  Location: [facility] CATH LAB/EP;  Service: Cardiology;  Laterality: N/A;    EXCISION OF LESION OF LIP N/A 07/23/2021    Procedure: EXCISION, LESION, LIP;  Surgeon: Cezar Ellis MD;  Location: [facility] OR [facility] FLR;  Service: ENT;  Laterality: N/A;    FLAP PROCEDURE N/A 08/12/2021    Procedure: CREATION, FREE FLAP;  Surgeon: Cezar Ellis MD;  Location: [facility] OR [facility] FLR;  Service: ENT;  Laterality: N/A;  Radial forearm    FOOT SURGERY      INSTANTANEOUS WAVE-FREE RATIO (IFR) N/A 8/9/2023    Procedure: Instantaneous Wave-Free Ratio (IFR);  Surgeon: Mao Hernandez III, MD;  Location: [facility] CATH LAB/EP;  Service: Cardiology;  Laterality: N/A;    LIP RECONSTRUCTION Bilateral 08/24/2021    Procedure: RECONSTRUCTION, LIP;  Surgeon: Cezar Ellis MD;  Location: [facility] OR [facility] FLR;  Service: ENT;  Laterality: Bilateral;    LIP RECONSTRUCTION N/A 10/08/2021    Procedure: RECONSTRUCTION, LIP;  Surgeon: Cezar Ellis MD;  Location: [facility] OR [facility] FLR;  Service: ENT;  Laterality: N/A;    NECK EXPLORATION Left 08/12/2021    Procedure: EXPLORATION, NECK;  Surgeon: Cezar Ellis MD;  [facility];  Service: ENT;  Laterality: Left;    RIGHT HEART CATHETERIZATION Right  8/9/2023    Procedure: INSERTION, CATHETER, RIGHT HEART;  Surgeon: Mao Hernandez III, MD;  Location: [facility] CATH LAB/EP;  Service: Cardiology;  Laterality: Right;    ROTATION FLAP SURGERY  07/23/2021    Procedure: CREATION, FLAP, ROTATION;  Surgeon: Cezar Ellis MD;  [facility];  Service: ENT;;    SKIN SPLIT GRAFT Left 08/12/2021    Procedure: APPLICATION, GRAFT, SKIN, SPLIT-THICKNESS;  Surgeon: Cezar Ellis MD;  Location: [facility] OR [address] [place] [address];  Service: ENT;  Laterality: Left;       Review of patient's allergies indicates:  No Known Allergies    No current facility-administered medications on file prior to encounter.     Current Outpatient Medications on File Prior to Encounter   Medication Sig    aspirin 81 MG Chew Take 1 tablet (81 mg total) by mouth once daily.    atorvastatin (LIPITOR) 80 MG tablet Take 1 tablet (80 mg total) by mouth once daily.    clopidogreL (PLAVIX) 75 mg tablet Take 1 tablet (75 mg total) by mouth once daily.    levETIRAcetam (KEPPRA) 500 MG Tab Take 1 tablet (500 mg total) by mouth 2 (two) times daily.    lisinopriL 10 MG tablet Take 1 tablet (10 mg total) by mouth once daily.    losartan (COZAAR) 25 MG tablet Take 25 mg by mouth once daily.    metoprolol succinate (TOPROL-XL) 25 MG 24 hr tablet Take 1 tablet (25 mg total) by mouth once daily.    nicotine (NICODERM CQ) 21 mg/24 hr Place 1 patch onto the skin once daily.    EScitalopram oxalate (LEXAPRO) 10 MG tablet Take 1 tablet (10 mg total) by mouth every evening. (Patient not taking: Reported on 8/29/2023)    [DISCONTINUED] furosemide (LASIX) 40 MG tablet Take 1 tablet (40 mg total) by mouth once daily.    [DISCONTINUED] hydrocortisone 2.5 % cream Apply topically 2 (two) times daily.     Family History       Problem Relation (Age of Onset)    Heart disease Father    No Known Problems Mother, Sister, Maternal Grandmother, Maternal Grandfather, Paternal Grandmother, Paternal Grandfather, Brother, Maternal  Aunt, Maternal Uncle, Paternal Aunt, Paternal Uncle          Tobacco Use    Smoking status: Former     Current packs/day: 2.00     Average packs/day: 2.0 packs/day for 44.7 years (89.3 ttl pk-yrs)     Types: Cigarettes     Start date: 1979    Smokeless tobacco: Never    Tobacco comments:     Pt is a 2 pk/day cigarette smoker x 45 yrs.He states that he recently cut down to only a few cigarettes per day, but then increased to his usual 2 pk/day again. Pt ready to quit.Ambulatory referral to SmokingCessation clinic following hospital discharge.    Substance and Sexual Activity    Alcohol use: Not Currently    Drug use: Yes     Types: Marijuana    Sexual activity: Not on file     Review of Systems   Constitutional:  Positive for activity change. Negative for chills and fever.   HENT:  Negative for trouble swallowing.    Eyes:  Negative for photophobia and visual disturbance.   Respiratory:  Positive for shortness of breath. Negative for chest tightness and wheezing.    Cardiovascular:  Positive for chest pain. Negative for palpitations and leg swelling.   Gastrointestinal:  Positive for nausea and vomiting. Negative for abdominal pain, constipation and diarrhea.   Genitourinary:  Negative for dysuria, frequency, hematuria and urgency.   Musculoskeletal:  Positive for arthralgias. Negative for back pain and gait problem.   Skin:  Negative for color change and rash.   Neurological:  Negative for dizziness, syncope, weakness, light-headedness, numbness and headaches.   Psychiatric/Behavioral:  Negative for agitation and confusion. The patient is not nervous/anxious.      Objective:     Vital Signs (Most Recent):  Temp: 99.2 °F (37.3 °C) (08/29/23 1101)  Pulse: 72 (08/29/23 1802)  Resp: 14 (08/29/23 1802)  BP: (!) 108/59 (08/29/23 1802)  SpO2: 99 % (08/29/23 1802) Vital Signs (24h Range):  Temp:  [99.2 °F (37.3 °C)] 99.2 °F (37.3 °C)  Pulse:  [66-75] 72  Resp:  [14-18] 14  SpO2:  [95 %-99 %] 99 %  BP: (105-125)/(58-65)  108/59     Weight: 61.7 kg (136 lb)  Body mass index is 17 kg/m².     Physical Exam  Vitals and nursing note reviewed.   Constitutional:       General: He is not in acute distress.     Appearance: He is well-developed. He is ill-appearing.   HENT:      Head: Normocephalic and atraumatic.      Mouth/Throat:      Mouth: Mucous membranes are moist.   Eyes:      Extraocular Movements: Extraocular movements intact.      Conjunctiva/sclera: Conjunctivae normal.   Cardiovascular:      Rate and Rhythm: Normal rate and regular rhythm.      Heart sounds: Normal heart sounds.   Pulmonary:      Effort: Pulmonary effort is normal. No respiratory distress.      Breath sounds: Rales present. No wheezing.   Abdominal:      General: Bowel sounds are normal. There is no distension.      Palpations: Abdomen is soft.      Tenderness: There is no abdominal tenderness.   Musculoskeletal:         General: No tenderness. Normal range of motion.      Cervical back: Normal range of motion and neck supple.      Right lower leg: No edema.      Left lower leg: No edema.   Skin:     General: Skin is warm and dry.      Capillary Refill: Capillary refill takes less than 2 seconds.      Findings: Bruising present.   Neurological:      General: No focal deficit present.      Mental Status: He is alert and oriented to person, place, and time. Mental status is at baseline.      Cranial Nerves: No cranial nerve deficit.      Sensory: No sensory deficit.      Coordination: Coordination normal.   Psychiatric:         Behavior: Behavior normal.         Thought Content: Thought content normal.         Judgment: Judgment normal.                Significant Labs: All pertinent labs within the past 24 hours have been reviewed.    Significant Imaging: I have reviewed all pertinent imaging results/findings within the past 24 hours.   1.98

## 2023-08-29 NOTE — Clinical Note
Diagnosis: Shortness of breath [786.05.ICD-9-CM]   Future Attending Provider: RHODA KAPOOR [[id number]]   Admitting Provider:: RHODA KAPOOR [[id number]]

## 2023-08-29 NOTE — ASSESSMENT & PLAN NOTE
Dangers of cigarette smoking were reviewed with patient in detail for 10 minutes and patient was encouraged to quit. Nicotine replacement options were discussed and ordered.

## 2023-08-29 NOTE — ED NOTES
Per pt last night while resting he was woken up by sever chest pain and SOB; reports s/s have persisted. Cp is now a 3/10 SP SL nitro given by EMS and SOB has decreased with 2L NC

## 2023-08-29 NOTE — ASSESSMENT & PLAN NOTE
Aortic valve regurgitation  Severe mitral regurgitation  Aortic valve stenosis  Chest pain  NSTEMI  57 y.o. presenting with chest pain consistent with prior episodes of angina. Pain is not reproducible on exam.  - Relieved with nitroglycerin, continue PRN  -  administered with EMS  - VSSAF   - Tn 0.074 -> 0.094  - BNP 2,619  - EKG showed NSR and LVH  - CXR showed pulmonary edema, pneumonia, or aspiration  - Replace electrolytes, keep Mag > 2 and K >4  - STAT trop and ekg if chest pain occurs  - Continue ASA, statin, plavix, BB  - NPO at midnight as precuation  - CBC, CMP (goal Mag>2, K>4) daily; lipid panel ordered/reviewed   - Monitor telemetry  - Echo pending    - HEART score 6  - If troponin rises, start ACS protocol w/ heparin gtt and consult interventional cardiology

## 2023-08-29 NOTE — ASSESSMENT & PLAN NOTE
Patient has Abnormal Magnesium: hypomagnesemia. Will continue to monitor electrolytes closely. Will replace the affected electrolytes and repeat labs to be done after interventions completed. The patient's magnesium results have been reviewed and are listed below.  Recent Labs   Lab 08/29/23  1138   MG 1.2*   - monitor daily mag levels  - replace as needed

## 2023-08-29 NOTE — ASSESSMENT & PLAN NOTE
- Patient has hypokalemia which is currently uncontrolled.   - Last electrolytes reviewed- Recent Labs   Lab 08/29/23  1138   K 2.7*   - Will replace potassium and monitor electrolytes closely.   - Continuous telemetry.

## 2023-08-29 NOTE — ASSESSMENT & PLAN NOTE
- CBC reviewed-   Lab Results   Component Value Date    HGB 10.8 (L) 08/29/2023    HCT 35 (L) 08/29/2023   - Patient's anemia is currently controlled. S/p 0 units of PRBCs.  - Anemia panel pending  - Follow with daily CBC  - Obtain type and screen and transfuse if Hgb <7, Hct <21, or patient is acutely symptomatic

## 2023-08-29 NOTE — HPI
"Paresh Vance Jr. is a 57 y.o. M with a PMHx of severe AS, triple vessel CAD (via cath in the last year), CHF, CVA, HLD, HTN, tobacco abuse who presents to [facility] for evaluation of chest pain. Patient reports substernal chest pain woke him up from sleep around 12AM last night. He describes pain as "sharp and intense" and says it felt like he was unable to take a deep breath. Endorses associated shortness of breath, diaphoresis, right shoulder pain, and nausea with 4 episodes of vomiting. Pain improved from 10/10 to 3/10 after he was given SL nitroglycerin x3 and ASA 325mg by EMS. Currently chest pain free. Denies fever, chills, LE swelling, orthopnea, HA, vision changes, numbness/tingling or syncope.    In the ED, AFVSS. No leukocytosis. K 2.7, Mag 1.2, t.bili 1.4. Trop 0.080>>0.074. BNP 2619. EKG without acute ST/T wave changes. CXR read as "pulmonary edema, pneumonia, aspiration, or sepsis". Given lasix 80mg IVP x1. Patient admitted to hospital medicine for further evaluation and management.   "

## 2023-08-29 NOTE — ASSESSMENT & PLAN NOTE
- CHF Pathway initiated  - BNP 2619 and CXR with pulmonary edema  - IV diuresis with Lasix 40 mg IV BID and monitor response.  Goal diuresis 2-3L/day.    - repeat echo pending  - Strict I&Os with daily weights  - Low Sodium diet with 1.5L fluid restriction  Results for orders placed during the hospital encounter of 08/07/23  Echo  Interpretation Summary    Left Ventricle: regional wall motion abnormalities present. There is low normal systolic function. Ejection fraction by visual approximation is 50%. There is diastolic dysfunction.    Right Ventricle: Normal right ventricular cavity size. Systolic function is normal.    Left Atrium: Left atrium is mildly dilated.    Aortic Valve: There is moderate aortic valve sclerosis. There is severe stenosis. Aortic valve area by VTI is 0.55 cm². Aortic valve peak velocity is 3.8 m/s. Mean gradient is 40 mmHg. The dimensionless index is 0.18. There is moderate aortic regurgitation.    Mitral Valve: Findings are consistent with rheumatic disease. There is moderate bileaflet sclerosis. There is mild stenosis. The mean pressure gradient across the mitral valve is 8 mmHg at a heart rate of 96 bpm. There is moderate regurgitation.    Pulmonic Valve: There is no significant stenosis.    IVC/SVC: Intermediate venous pressure at 8 mmHg.

## 2023-08-29 NOTE — PHARMACY MED REC
"Admission Medication History     The home medication history was taken by Checo Villarreal.    You may go to "Admission" then "Reconcile Home Medications" tabs to review and/or act upon these items.     The home medication list has been updated by the Pharmacy department.   Please read ALL comments highlighted in yellow.   Please address this information as you see fit.    Feel free to contact us if you have any questions or require assistance.      The medications listed below were removed from the home medication list. Please reorder if appropriate:  Patient reports no longer taking the following medication(s):  FUROSEMIDE 40 MG TABLET  HYDROCORTISONE 2.5 % CREAM      Current Outpatient Medications on File Prior to Encounter   Medication Sig    aspirin 81 MG Chew   Take 1 tablet (81 mg total) by mouth once daily.    atorvastatin (LIPITOR) 80 MG tablet   Take 1 tablet (80 mg total) by mouth once daily.    clopidogreL (PLAVIX) 75 mg tablet   Take 1 tablet (75 mg total) by mouth once daily.    levETIRAcetam (KEPPRA) 500 MG Tab   Take 1 tablet (500 mg total) by mouth 2 (two) times daily.    lisinopriL 10 MG tablet   Take 1 tablet (10 mg total) by mouth once daily.    losartan (COZAAR) 25 MG tablet   Take 25 mg by mouth once daily.    metoprolol succinate (TOPROL-XL) 25 MG 24 hr tablet   Take 1 tablet (25 mg total) by mouth once daily.    nicotine (NICODERM CQ) 21 mg/24 hr   Place 1 patch onto the skin once daily.    Patient reports using 14 mg step patch. Recent fill was for 21 mg patch.      EScitalopram oxalate (LEXAPRO) 10 MG tablet   Take 1 tablet (10 mg total) by mouth every evening. (Patient not taking: Reported on 8/29/2023)     Potential issues to be addressed PRIOR TO DISCHARGE  Patient requires education regarding drug therapies     Checo Villarreal  EXT [id number]                  .          "

## 2023-08-29 NOTE — ED PROVIDER NOTES
Encounter Date: 8/29/2023       History     Chief Complaint   Patient presents with    Chest Pain     X several weeks with progressive worsening last night, 8/28/23. Administered 3 SL NTG tabs and 324mg ASA en route via EMS with no relief. Pain score 8/10. Hx of BAILEY PENA Jr Vance is a 57-year-old male with a complex cardiac history, presenting with chest pain and shortness of breath, waking him from sleep this morning at midnight.   The pain started central midsternal and radiated to the right chest It was sharp and intense. It was not improved with changes in position, and and was associated with extreme shortness of breath. Patient described it as feeling he was unable to take a deep breath in.   The pain worsened during the night to the point that he asked his son to call an ambulance.   Patient received nitroglycerin in the ambulance and is now on O2, and states he feels his chest pain and shortness of breath has improved.  Nil nausea, vomiting, cough, or recent illness. Denies diarrhoea. Nil changes in consciousness. Last bowel movement four days ago, hard. Last normal bowel movement one month ago.    Patient smoked for a number of years, but states he has recently quit.     The history is provided by the patient. No  was used.     Review of patient's allergies indicates:  No Known Allergies  Past Medical History:   Diagnosis Date    Basal cell carcinoma (BCC) of upper lip 6/20/2021    Cerebrovascular accident (CVA) due to thrombosis of right middle cerebral artery 8/9/2023    Coronary artery disease involving native coronary artery without angina pectoris 11/12/2015    Hyperlipidemia 11/12/2015    Hypertension     Syncope and collapse 6/29/2023     Past Surgical History:   Procedure Laterality Date    CORONARY ANGIOGRAPHY  2015    CORONARY ANGIOGRAPHY Right 5/12/2023    Procedure: ANGIOGRAM, CORONARY ARTERY;  Surgeon: Ryan Huertas MD;  Location: [facility] CATH LAB;  Service:  Cardiology;  Laterality: Right;    CORONARY ANGIOGRAPHY N/A 8/9/2023    Procedure: ANGIOGRAM, CORONARY ARTERY;  Surgeon: Mao Hernandez III, MD;  Location: [facility] CATH LAB/EP;  Service: Cardiology;  Laterality: N/A;    EXCISION OF LESION OF LIP N/A 07/23/2021    Procedure: EXCISION, LESION, LIP;  Surgeon: Cezar Ellis MD;  Location: [facility] OR 2ND FLR;  Service: ENT;  Laterality: N/A;    FLAP PROCEDURE N/A 08/12/2021    Procedure: CREATION, FREE FLAP;  Surgeon: Cezar Ellis MD;  Location: [facility] OR 2ND FLR;  Service: ENT;  Laterality: N/A;  Radial forearm    FOOT SURGERY      INSTANTANEOUS WAVE-FREE RATIO (IFR) N/A 8/9/2023    Procedure: Instantaneous Wave-Free Ratio (IFR);  Surgeon: Mao Hernandez III, MD;  Location: [facility] CATH LAB/EP;  Service: Cardiology;  Laterality: N/A;    LIP RECONSTRUCTION Bilateral 08/24/2021    Procedure: RECONSTRUCTION, LIP;  Surgeon: Cezar Ellis MD;  Location: [facility] OR 2ND FLR;  Service: ENT;  Laterality: Bilateral;    LIP RECONSTRUCTION N/A 10/08/2021    Procedure: RECONSTRUCTION, LIP;  Surgeon: Cezar Ellis MD;  Location: [facility] OR [facility] FLR;  Service: ENT;  Laterality: N/A;    NECK EXPLORATION Left 08/12/2021    Procedure: EXPLORATION, NECK;  Surgeon: Cezar Ellis MD;  Location: [facility] OR [facility] FLR;  Service: ENT;  Laterality: Left;    RIGHT HEART CATHETERIZATION Right 8/9/2023    Procedure: INSERTION, CATHETER, RIGHT HEART;  Surgeon: Mao Hernandez III, MD;  Location: [facility] CATH LAB/EP;  Service: Cardiology;  Laterality: Right;    ROTATION FLAP SURGERY  07/23/2021    Procedure: CREATION, FLAP, ROTATION;  Surgeon: Cezar Ellis MD;  Location: [facility] OR [facility] FLR;  Service: ENT;;    SKIN SPLIT GRAFT Left 08/12/2021    Procedure: APPLICATION, GRAFT, SKIN, SPLIT-THICKNESS;  Surgeon: Cezar Ellis MD;  Location: [facility] OR 2ND FLR;  Service: ENT;  Laterality: Left;     Family History   Problem Relation Age of Onset    Heart disease Father      No Known Problems Mother     No Known Problems Sister     No Known Problems Maternal Grandmother     No Known Problems Maternal Grandfather     No Known Problems Paternal Grandmother     No Known Problems Paternal Grandfather     No Known Problems Brother     No Known Problems Maternal Aunt     No Known Problems Maternal Uncle     No Known Problems Paternal Aunt     No Known Problems Paternal Uncle     Anemia Neg Hx     Arrhythmia Neg Hx     Asthma Neg Hx     Clotting disorder Neg Hx     Fainting Neg Hx     Heart attack Neg Hx     Heart failure Neg Hx     Hyperlipidemia Neg Hx     Hypertension Neg Hx     Stroke Neg Hx     Atrial Septal Defect Neg Hx      Social History     Tobacco Use    Smoking status: Former     Current packs/day: 2.00     Average packs/day: 2.0 packs/day for 44.7 years (89.3 ttl pk-yrs)     Types: Cigarettes     Start date: 1979    Smokeless tobacco: Never    Tobacco comments:     Pt is a 2 pk/day cigarette smoker x 45 yrs.He states that he recently cut down to only a few cigarettes per day, but then increased to his usual 2 pk/day again. Pt ready to quit.Ambulatory referral to [facility]oking[facility]essation clinic following hospital discharge.    Substance Use Topics    Alcohol use: Not Currently    Drug use: Yes     Types: Marijuana     Review of Systems   Constitutional:         See HPI       Physical Exam     Initial Vitals [08/29/23 1101]   BP Pulse Resp Temp SpO2   125/65 75 16 99.2 °F (37.3 °C) 95 %      MAP       --         Physical Exam    Nursing note and vitals reviewed.  Constitutional: He is not diaphoretic. No distress.   Chronically ill-appearing, appears older than stated age   HENT:   Head: Normocephalic and atraumatic.   Eyes: Conjunctivae and EOM are normal.   Neck: Neck supple.   Normal range of motion.  Cardiovascular:  Normal rate, regular rhythm, normal heart sounds and intact distal pulses.           Pulses are strong and symmetric   Pulmonary/Chest: No respiratory distress. He has no  wheezes. He has rhonchi. He has rales.   Crackles bilateral bases   Abdominal: Abdomen is soft. Bowel sounds are normal. He exhibits no distension. There is no abdominal tenderness. There is no rebound and no guarding.   Musculoskeletal:         General: No tenderness or edema. Normal range of motion.      Cervical back: Normal range of motion and neck supple.     Neurological: He is alert. He has normal strength.   Skin: Skin is warm and dry.   Scattered old ecchymosis on bilateral extremities   Psychiatric: He has a normal mood and affect. Thought content normal.         ED Course   Procedures  Labs Reviewed   CBC W/ AUTO DIFFERENTIAL - Abnormal; Notable for the following components:       Result Value    RBC 3.27 (*)     Hemoglobin 10.8 (*)     Hematocrit 32.2 (*)     MCV 99 (*)     MCH 33.0 (*)     RDW 17.1 (*)     Gran % 74.7 (*)     All other components within normal limits    Narrative:     Release to patient->Immediate   COMPREHENSIVE METABOLIC PANEL - Abnormal; Notable for the following components:    Potassium 2.7 (*)     CO2 22 (*)     Calcium 7.8 (*)     Total Protein 4.9 (*)     Albumin 2.8 (*)     Total Bilirubin 1.4 (*)     ALT 8 (*)     All other components within normal limits    Narrative:     Release to patient->Immediate    add on lipase per Jase Woodard DO order# [id number] 08/29/2023 @   12:26    TROPONIN I - Abnormal; Notable for the following components:    Troponin I 0.074 (*)     All other components within normal limits    Narrative:     Release to patient->Immediate   TROPONIN I - Abnormal; Notable for the following components:    Troponin I 0.080 (*)     All other components within normal limits   B-TYPE NATRIURETIC PEPTIDE - Abnormal; Notable for the following components:    BNP 2,619 (*)     All other components within normal limits    Narrative:     Release to patient->Immediate   MAGNESIUM - Abnormal; Notable for the following components:    Magnesium 1.2 (*)     All other  components within normal limits    Narrative:     Release to patient->Immediate    add on lipase per Jase L. CAtes DO order# [id number] 08/29/2023 @   12:26    add on mg per Jase L. CAtes DO order# [id number] 08/29/2023 @ 13:09    HIV 1 / 2 ANTIBODY    Narrative:     Release to patient->Immediate    add on lipase per Jase L. CAtes DO order# [id number] 08/29/2023 @   12:26    HEPATITIS C ANTIBODY    Narrative:     Release to patient->Immediate    add on lipase per Jase L. CAtes DO order# [id number] 08/29/2023 @   12:26    SARS-COV-2 (COVID-19) QUALITATIVE PCR    Narrative:     Is the patient symptomatic?->Yes  Is this needed for pre-procedure or pre-op testing?->No   LIPASE   TROPONIN ISTAT   LIPASE    Narrative:     Release to patient->Immediate    add on lipase per Jase L. CAtes DO order# [id number] 08/29/2023 @   12:26    MAGNESIUM   TROPONIN I   POCT TROPONIN   POCT TROPONIN   ISTAT CHEM8     EKG Readings: (Independently Interpreted)   Initial Reading: No STEMI. Previous EKG: Compared with most recent EKG Rhythm: Normal Sinus Rhythm. Heart Rate: 72. ST Segments: Non-Specific ST Segment Depression. T Waves: Normal.   LVH     ECG Results              EKG 12-lead (Final result)  Result time 08/29/23 13:17:44      Final result by Interface, Lab In [facility] (08/29/23 13:17:44)                   Narrative:    Test Reason : R07.9,    Vent. Rate : 072 BPM     Atrial Rate : 072 BPM     P-R Int : 144 ms          QRS Dur : 108 ms      QT Int : 420 ms       P-R-T Axes : 043 050 -03 degrees     QTc Int : 459 ms    Normal sinus rhythm  LVH with repolarization abnormality  Tiny inferolateral Qs  Abnormal ECG  When compared with ECG of 06-AUG-2023 23:09,  Premature atrial complexes are no longer Present  Vent. rate has decreased BY  53 BPM  Questionable change in initial forces of Inferior leads  ST no longer depressed in Inferior leads  ST no longer depressed in Lateral leads  Confirmed by Andre ARIAS MD ([id number]) on 8/29/2023  1:17:33 PM    Referred By: [email or web address]   SELF           Confirmed By:Andre ARIAS MD                                  Imaging Results              X-Ray Chest AP Portable (Final result)  Result time 08/29/23 12:19:27      Final result by Rick Samaniego III, MD (08/29/23 12:19:27)                   Impression:      Pulmonary edema pneumonia aspiration or sepsis.      Electronically signed by: Rick Samaniego MD  Date:    08/29/2023  Time:    12:19               Narrative:    EXAMINATION:  XR CHEST AP PORTABLE    CLINICAL HISTORY:  Chest Pain;    FINDINGS:  Chest one view: Heart size is normal.  There is aortic plaque.  There is moderate edema pleural fluid.                                       Medications   potassium chloride 10 mEq in 100 mL IVPB (has no administration in time range)   magnesium sulfate 2g in water 50mL IVPB (premix) (has no administration in time range)   furosemide injection 80 mg (80 mg Intravenous Given 8/29/23 1233)   potassium chloride 10 mEq in 100 mL IVPB (10 mEq Intravenous New Bag 8/29/23 1259)   potassium bicarbonate disintegrating tablet 60 mEq (60 mEq Oral Given 8/29/23 1305)     Medical Decision Making  Paresh Vance is a 57-year-old male with a complex cardiac history, presenting with chest pain and shortness of breath, waking him from sleep this morning at midnight.  He presents with normal vital signs.  Cardiac labs ordered.    Amount and/or Complexity of Data Reviewed  Labs: ordered. Decision-making details documented in ED Course.  Radiology: ordered.    Risk  OTC drugs.  Prescription drug management.               ED Course as of 08/29/23 1350   Tue Aug 29, 2023   1110 No STEMI [AC]   1158 WBC: 7.17  No leukocytosis [KL]   1159 Hemoglobin(!): 10.8  Stable [KL]   1222 Troponin I(!): 0.080  Troponin elevated.  He received full-dose aspirin by EMS.  Downtrending however compared to 3 weeks prior [KL]   1222 BNP(!): 2,619  BNP significantly elevated.  Consistent with chest x-ray  with volume overload. [KL]   1318 Potassium(!!): 2.7  Repletion ordered.  Repeat i-STAT ordered for 3pm.  [KL]   1349 Initiated diuresis in the emergency department.  Discussed with hospital medicine plan for observation in the hospital for continued monitoring and treatment.  Will continue to trend troponin and provide electrolyte repletion. [KL]      ED Course User Index  [AC] Jase Blanc,   [KL] Ashley Buck MD                    Clinical Impression:   Final diagnoses:  [R07.9] Chest pain  [R06.02] Shortness of breath (Primary)  [E87.70] Hypervolemia, unspecified hypervolemia type  [I35.0] Aortic valve stenosis, etiology of cardiac valve disease unspecified  [I50.9] Acute on chronic congestive heart failure, unspecified heart failure type  [E87.6] Hypokalemia  [E83.42] Hypomagnesemia        ED Disposition Condition    Observation Stable                Ashley Buck MD  Resident  08/29/23 [id number]

## 2023-08-30 DIAGNOSIS — R06.02 SOB (SHORTNESS OF BREATH): Primary | ICD-10-CM

## 2023-08-30 PROBLEM — I50.32 CHRONIC DIASTOLIC HEART FAILURE: Status: ACTIVE | Noted: 2023-08-29

## 2023-08-30 PROBLEM — I25.110 CORONARY ARTERY DISEASE INVOLVING NATIVE CORONARY ARTERY OF NATIVE HEART WITH UNSTABLE ANGINA PECTORIS: Status: ACTIVE | Noted: 2023-08-30

## 2023-08-30 LAB
ANION GAP SERPL CALC-SCNC: 11 MMOL/L (ref 8–16)
AV INDEX (PROSTH): 0.26
AV MEAN GRADIENT: 43 MMHG
AV PEAK GRADIENT: 66 MMHG
AV REGURGITATION PRESSURE HALF TIME: 570 MS
AV VALVE AREA BY VELOCITY RATIO: 1.07 CM²
AV VALVE AREA: 1 CM²
AV VELOCITY RATIO: 0.28
BSA FOR ECHO PROCEDURE: 1.85 M2
BUN SERPL-MCNC: 8 MG/DL (ref 6–20)
CALCIUM SERPL-MCNC: 8.7 MG/DL (ref 8.7–10.5)
CHLORIDE SERPL-SCNC: 100 MMOL/L (ref 95–110)
CO2 SERPL-SCNC: 28 MMOL/L (ref 23–29)
CREAT SERPL-MCNC: 0.8 MG/DL (ref 0.5–1.4)
CV ECHO LV RWT: 0.27 CM
DOP CALC AO PEAK VEL: 4.07 M/S
DOP CALC AO VTI: 91.19 CM
DOP CALC LVOT AREA: 3.8 CM2
DOP CALC LVOT DIAMETER: 2.2 CM
DOP CALC LVOT PEAK VEL: 1.15 M/S
DOP CALC LVOT STROKE VOLUME: 91.57 CM3
DOP CALCLVOT PEAK VEL VTI: 24.1 CM
E WAVE DECELERATION TIME: 276.91 MSEC
E/A RATIO: 1.02
E/E' RATIO: 11.88 M/S
ECHO LV POSTERIOR WALL: 0.87 CM (ref 0.6–1.1)
EST. GFR  (NO RACE VARIABLE): >60 ML/MIN/1.73 M^2
FRACTIONAL SHORTENING: 26 % (ref 28–44)
GLUCOSE SERPL-MCNC: 91 MG/DL (ref 70–110)
INTERVENTRICULAR SEPTUM: 0.77 CM (ref 0.6–1.1)
LA MAJOR: 5.54 CM
LA MINOR: 5.93 CM
LA WIDTH: 4.79 CM
LEFT ATRIUM SIZE: 4.73 CM
LEFT ATRIUM VOLUME INDEX MOD: 54.8 ML/M2
LEFT ATRIUM VOLUME INDEX: 58.1 ML/M2
LEFT ATRIUM VOLUME MOD: 104.14 CM3
LEFT ATRIUM VOLUME: 110.32 CM3
LEFT INTERNAL DIMENSION IN SYSTOLE: 4.66 CM (ref 2.1–4)
LEFT VENTRICLE DIASTOLIC VOLUME INDEX: 107.25 ML/M2
LEFT VENTRICLE DIASTOLIC VOLUME: 203.77 ML
LEFT VENTRICLE MASS INDEX: 111 G/M2
LEFT VENTRICLE SYSTOLIC VOLUME INDEX: 52.7 ML/M2
LEFT VENTRICLE SYSTOLIC VOLUME: 100.13 ML
LEFT VENTRICULAR INTERNAL DIMENSION IN DIASTOLE: 6.34 CM (ref 3.5–6)
LEFT VENTRICULAR MASS: 211.37 G
LV LATERAL E/E' RATIO: 9.5 M/S
LV SEPTAL E/E' RATIO: 15.83 M/S
MR PISA EROA: 0.45 CM2
MR VTI: 194 CM
MV A" WAVE DURATION": 5.99 MSEC
MV PEAK A VEL: 0.93 M/S
MV PEAK E VEL: 0.95 M/S
PISA MRMAX VEL: 6 M/S
PISA RADIUS: 1.2 CM
PISA TR MAX VEL: 2.39 M/S
PISA VN NYQUIST: 0.3 M/S
POTASSIUM SERPL-SCNC: 3.5 MMOL/L (ref 3.5–5.1)
PULM VEIN S/D RATIO: 1.13
PV PEAK D VEL: 0.46 M/S
PV PEAK S VEL: 0.52 M/S
RA MAJOR: 4.53 CM
RA PRESSURE ESTIMATED: 3 MMHG
RA WIDTH: 3.61 CM
RIGHT VENTRICULAR END-DIASTOLIC DIMENSION: 2.93 CM
RV TB RVSP: 5 MMHG
SINUS: 3.66 CM
SODIUM SERPL-SCNC: 139 MMOL/L (ref 136–145)
TDI LATERAL: 0.1 M/S
TDI SEPTAL: 0.06 M/S
TDI: 0.08 M/S
TR MAX PG: 23 MMHG
TRICUSPID ANNULAR PLANE SYSTOLIC EXCURSION: 1.89 CM
TV REST PULMONARY ARTERY PRESSURE: 26 MMHG
Z-SCORE OF LEFT VENTRICULAR DIMENSION IN END DIASTOLE: 1.73
Z-SCORE OF LEFT VENTRICULAR DIMENSION IN END SYSTOLE: 2.76

## 2023-08-30 PROCEDURE — 21400001 HC TELEMETRY ROOM

## 2023-08-30 PROCEDURE — 25000003 PHARM REV CODE 250

## 2023-08-30 PROCEDURE — 63600175 PHARM REV CODE 636 W HCPCS: Performed by: INTERNAL MEDICINE

## 2023-08-30 PROCEDURE — 63600175 PHARM REV CODE 636 W HCPCS

## 2023-08-30 PROCEDURE — 36415 COLL VENOUS BLD VENIPUNCTURE: CPT

## 2023-08-30 PROCEDURE — 99214 OFFICE O/P EST MOD 30 MIN: CPT | Mod: 25,,, | Performed by: INTERNAL MEDICINE

## 2023-08-30 PROCEDURE — 99233 SBSQ HOSP IP/OBS HIGH 50: CPT | Mod: ,,,

## 2023-08-30 PROCEDURE — 99233 PR SUBSEQUENT HOSPITAL CARE,LEVL III: ICD-10-PCS | Mod: ,,,

## 2023-08-30 PROCEDURE — G0378 HOSPITAL OBSERVATION PER HR: HCPCS

## 2023-08-30 PROCEDURE — S4991 NICOTINE PATCH NONLEGEND: HCPCS

## 2023-08-30 PROCEDURE — 80048 BASIC METABOLIC PNL TOTAL CA: CPT

## 2023-08-30 PROCEDURE — 99291 CRITICAL CARE FIRST HOUR: CPT | Mod: ,,, | Performed by: INTERNAL MEDICINE

## 2023-08-30 PROCEDURE — 99291 PR CRITICAL CARE, E/M 30-74 MINUTES: ICD-10-PCS | Mod: ,,, | Performed by: INTERNAL MEDICINE

## 2023-08-30 PROCEDURE — 99214 PR OFFICE/OUTPT VISIT, EST, LEVL IV, 30-39 MIN: ICD-10-PCS | Mod: 25,,, | Performed by: INTERNAL MEDICINE

## 2023-08-30 RX ORDER — MAGNESIUM SULFATE HEPTAHYDRATE 40 MG/ML
2 INJECTION, SOLUTION INTRAVENOUS EVERY 4 HOURS
Status: COMPLETED | OUTPATIENT
Start: 2023-08-30 | End: 2023-08-30

## 2023-08-30 RX ORDER — METOPROLOL SUCCINATE 50 MG/1
50 TABLET, EXTENDED RELEASE ORAL DAILY
Status: DISCONTINUED | OUTPATIENT
Start: 2023-08-30 | End: 2023-08-31 | Stop reason: HOSPADM

## 2023-08-30 RX ORDER — FUROSEMIDE 40 MG/1
40 TABLET ORAL DAILY
Status: DISCONTINUED | OUTPATIENT
Start: 2023-08-31 | End: 2023-08-31 | Stop reason: HOSPADM

## 2023-08-30 RX ADMIN — LEVETIRACETAM 500 MG: 500 TABLET, FILM COATED ORAL at 12:08

## 2023-08-30 RX ADMIN — CLOPIDOGREL BISULFATE 75 MG: 75 TABLET ORAL at 08:08

## 2023-08-30 RX ADMIN — FUROSEMIDE 40 MG: 10 INJECTION, SOLUTION INTRAMUSCULAR; INTRAVENOUS at 08:08

## 2023-08-30 RX ADMIN — POTASSIUM BICARBONATE 40 MEQ: 391 TABLET, EFFERVESCENT ORAL at 08:08

## 2023-08-30 RX ADMIN — ASPIRIN 81 MG 81 MG: 81 TABLET ORAL at 08:08

## 2023-08-30 RX ADMIN — NICOTINE 1 PATCH: 14 PATCH, EXTENDED RELEASE TRANSDERMAL at 08:08

## 2023-08-30 RX ADMIN — LISINOPRIL 10 MG: 10 TABLET ORAL at 08:08

## 2023-08-30 RX ADMIN — MAGNESIUM SULFATE 2 G: 2 INJECTION INTRAVENOUS at 08:08

## 2023-08-30 RX ADMIN — MAGNESIUM SULFATE 2 G: 2 INJECTION INTRAVENOUS at 10:08

## 2023-08-30 RX ADMIN — LEVETIRACETAM 500 MG: 500 TABLET, FILM COATED ORAL at 08:08

## 2023-08-30 RX ADMIN — ESCITALOPRAM OXALATE 10 MG: 10 TABLET ORAL at 12:08

## 2023-08-30 RX ADMIN — ATORVASTATIN CALCIUM 80 MG: 40 TABLET, FILM COATED ORAL at 08:08

## 2023-08-30 RX ADMIN — METOPROLOL SUCCINATE 50 MG: 50 TABLET, EXTENDED RELEASE ORAL at 08:08

## 2023-08-30 RX ADMIN — ESCITALOPRAM OXALATE 10 MG: 10 TABLET ORAL at 08:08

## 2023-08-30 RX ADMIN — FUROSEMIDE 40 MG: 10 INJECTION, SOLUTION INTRAMUSCULAR; INTRAVENOUS at 12:08

## 2023-08-30 NOTE — HPI
Paresh Vance is a 57 years old man with severe AS, mild MS + moderate MR, triple vessel CAD (pLAD, pLCx,  mRCA), Subclavian steal (Saw  for subclavian stenosis and 50% left CCA - to follow with careful watching), HTN, Smoking (reports to quit now), HLD, moderate MR who presented to the ED with chest pain and CHF. He has been found to have an NSTEMI with mild troponin elevation. Cardiology was consulted overnight and recommended diuresis and IC consult for evaluation of angiogram/TAVR workup. Of note, he previously saw CTS and was deemed to not be a surgical candidate for AVR + CABG.

## 2023-08-30 NOTE — CARE UPDATE
Talked to Dr Garcia and Dr Quiroga from IC about the patient  Dr Quiroga to evaluate, NPO until IC's evaluation

## 2023-08-30 NOTE — ASSESSMENT & PLAN NOTE
-severe and symptomatic  -have CTS re-evaluate given age  -can workup for TAVR if not a surgical candidate

## 2023-08-30 NOTE — CONSULTS
[facility]  Interventional Cardiology  Consult Note    Patient Name: Paresh Vance Jr.  MRN: [id number]  Admission Date: 8/29/2023  Hospital Length of Stay: 0 days  Code Status: Full Code   Attending Provider: Taran Lopez MD  Consulting Provider: Rick Quiroga MD  Primary Care Physician: Gloria Shahid MD  Principal Problem:Acute on chronic diastolic heart failure    Patient information was obtained from patient and past medical records.     Inpatient consult to Interventional Cardiology  Consult performed by: Rick Quiroga MD  Consult ordered by: Kaden Noel MD        Subjective:     Chief Complaint:  Chest pain, SOB     HPI:  Paresh Vance is a 57 years old man with severe AS, mild MS + moderate MR, triple vessel CAD (pLAD, pLCx,  mRCA), Subclavian steal (Saw  for subclavian stenosis and 50% left CCA - to follow with careful watching), HTN, Smoking (reports to quit now), HLD, moderate MR who presented to the ED with chest pain and CHF. He has been found to have an NSTEMI with mild troponin elevation. Cardiology was consulted overnight and recommended diuresis and IC consult for evaluation of angiogram/TAVR workup. Of note, he previously saw CTS and was deemed to not be a surgical candidate for AVR + CABG.          Past Medical History:   Diagnosis Date    Basal cell carcinoma (BCC) of upper lip 6/20/2021    Cerebrovascular accident (CVA) due to thrombosis of right middle cerebral artery 8/9/2023    Coronary artery disease involving native coronary artery without angina pectoris 11/12/2015    Hyperlipidemia 11/12/2015    Hypertension     Syncope and collapse 6/29/2023       Past Surgical History:   Procedure Laterality Date    CORONARY ANGIOGRAPHY  2015    CORONARY ANGIOGRAPHY Right 5/12/2023    Procedure: ANGIOGRAM, CORONARY ARTERY;  Surgeon: Ryan Huertas MD;  Location: [facility] CATH LAB;  Service: Cardiology;  Laterality: Right;    CORONARY ANGIOGRAPHY N/A  8/9/2023    Procedure: ANGIOGRAM, CORONARY ARTERY;  Surgeon: Mao Hernandez III, MD;  Location: [facility] CATH LAB/EP;  Service: Cardiology;  Laterality: N/A;    EXCISION OF LESION OF LIP N/A 07/23/2021    Procedure: EXCISION, LESION, LIP;  Surgeon: Cezar Ellis MD;  Location: [facility] OR 2ND FLR;  Service: ENT;  Laterality: N/A;    FLAP PROCEDURE N/A 08/12/2021    Procedure: CREATION, FREE FLAP;  Surgeon: Cezar Ellis MD;  Location: [facility] OR [employer] FLR;  Service: ENT;  Laterality: N/A;  Radial forearm    FOOT SURGERY      INSTANTANEOUS WAVE-FREE RATIO (IFR) N/A 8/9/2023    Procedure: Instantaneous Wave-Free Ratio (IFR);  Surgeon: Mao Hernandez III, MD;  Location: [facility] CATH LAB/EP;  Service: Cardiology;  Laterality: N/A;    LIP RECONSTRUCTION Bilateral 08/24/2021    Procedure: RECONSTRUCTION, LIP;  Surgeon: Cezar Ellis MD;  Location: [facility] OR [employer] FLR;  Service: ENT;  Laterality: Bilateral;    LIP RECONSTRUCTION N/A 10/08/2021    Procedure: RECONSTRUCTION, LIP;  Surgeon: Cezar Ellis MD;  [facility];  Service: ENT;  Laterality: N/A;    NECK EXPLORATION Left 08/12/2021    Procedure: EXPLORATION, NECK;  Surgeon: Cezar Ellis MD;  [facility];  Service: ENT;  Laterality: Left;    RIGHT HEART CATHETERIZATION Right 8/9/2023    Procedure: INSERTION, CATHETER, RIGHT HEART;  Surgeon: Mao Hernandez III, MD;  Location: [facility] CATH LAB/EP;  Service: Cardiology;  Laterality: Right;    ROTATION FLAP SURGERY  07/23/2021    Procedure: CREATION, FLAP, ROTATION;  Surgeon: Cezar Ellis MD;  Location: [facility] OR [employer] FLR;  Service: ENT;;    SKIN SPLIT GRAFT Left 08/12/2021    Procedure: APPLICATION, GRAFT, SKIN, SPLIT-THICKNESS;  Surgeon: Cezar lElis MD;  Location: [facility] OR [employer] FLR;  Service: ENT;  Laterality: Left;       Review of patient's allergies indicates:  No Known Allergies    PTA Medications   Medication Sig    aspirin 81 MG Chew Take 1 tablet  (81 mg total) by mouth once daily.    atorvastatin (LIPITOR) 80 MG tablet Take 1 tablet (80 mg total) by mouth once daily.    clopidogreL (PLAVIX) 75 mg tablet Take 1 tablet (75 mg total) by mouth once daily.    levETIRAcetam (KEPPRA) 500 MG Tab Take 1 tablet (500 mg total) by mouth 2 (two) times daily.    lisinopriL 10 MG tablet Take 1 tablet (10 mg total) by mouth once daily.    losartan (COZAAR) 25 MG tablet Take 25 mg by mouth once daily.    metoprolol succinate (TOPROL-XL) 25 MG 24 hr tablet Take 1 tablet (25 mg total) by mouth once daily.    nicotine (NICODERM CQ) 21 mg/24 hr Place 1 patch onto the skin once daily.    EScitalopram oxalate (LEXAPRO) 10 MG tablet Take 1 tablet (10 mg total) by mouth every evening. (Patient not taking: Reported on 8/29/2023)     Family History       Problem Relation (Age of Onset)    Heart disease Father    No Known Problems Mother, Sister, Maternal Grandmother, Maternal Grandfather, Paternal Grandmother, Paternal Grandfather, Brother, Maternal Aunt, Maternal Uncle, Paternal Aunt, Paternal Uncle          Tobacco Use    Smoking status: Former     Current packs/day: 2.00     Average packs/day: 2.0 packs/day for 44.7 years (89.3 ttl pk-yrs)     Types: Cigarettes     Start date: 1979    Smokeless tobacco: Never    Tobacco comments:     Pt is a 2 pk/day cigarette smoker x 45 yrs.He states that he recently cut down to only a few cigarettes per day, but then increased to his usual 2 pk/day again. Pt ready to quit.Ambulatory referral to SmokingCessation clinic following hospital discharge.    Substance and Sexual Activity    Alcohol use: Not Currently    Drug use: Yes     Types: Marijuana    Sexual activity: Not on file     Review of Systems   Cardiovascular:  Positive for chest pain and dyspnea on exertion.   Respiratory:  Positive for shortness of breath.    All other systems reviewed and are negative.    Objective:     Vital Signs (Most Recent):  Temp: 97.9 °F (36.6 °C) (08/30/23  0759)  Pulse: 69 (08/30/23 0759)  Resp: 18 (08/30/23 0759)  BP: 122/62 (08/30/23 0809)  SpO2: 95 % (08/30/23 0759) Vital Signs (24h Range):  Temp:  [97.9 °F (36.6 °C)-99.2 °F (37.3 °C)] 97.9 °F (36.6 °C)  Pulse:  [66-75] 69  Resp:  [14-18] 18  SpO2:  [95 %-100 %] 95 %  BP: (102-125)/(54-65) 122/62     Weight: 64.9 kg (143 lb)  Body mass index is 17.87 kg/m².    SpO2: 95 %         Intake/Output Summary (Last 24 hours) at 8/30/2023 0925  Last data filed at 8/30/2023 0644  Gross per 24 hour   Intake 250 ml   Output 6200 ml   Net -5950 ml       Lines/Drains/Airways       Peripheral Intravenous Line  Duration                  Peripheral IV - Single Lumen 08/29/23 20 G Left Forearm 1 day         Peripheral IV - Single Lumen 08/29/23 1629 18 G Right Antecubital <1 day                     Physical Exam  Vitals and nursing note reviewed.   Constitutional:       Appearance: Normal appearance.   HENT:      Head: Normocephalic and atraumatic.      Right Ear: External ear normal.      Left Ear: External ear normal.      Nose: Nose normal.      Mouth/Throat:      Mouth: Mucous membranes are moist.   Eyes:      Pupils: Pupils are equal, round, and reactive to light.   Cardiovascular:      Rate and Rhythm: Normal rate and regular rhythm.      Pulses: Normal pulses.      Heart sounds: Murmur heard.   Pulmonary:      Effort: Pulmonary effort is normal.   Abdominal:      General: Abdomen is flat.   Musculoskeletal:         General: Normal range of motion.      Cervical back: Normal range of motion.   Skin:     General: Skin is warm and dry.      Capillary Refill: Capillary refill takes less than 2 seconds.   Neurological:      General: No focal deficit present.      Mental Status: He is alert and oriented to person, place, and time.            Significant Labs: All pertinent lab results from the last 24 hours have been reviewed.    Significant Imaging:  reviewed    Assessment and Plan:     Neuro  Cerebrovascular accident (CVA) due to  thrombosis of right middle cerebral artery  -asa, plavix, statin, bp control     Cardiac/Vascular  * Acute on chronic diastolic heart failure  -continue diuresis per cardiology consult team     Aortic valve regurgitation  -pending CTS evaluation     Aortic valve stenosis  -severe and symptomatic  -have CTS re-evaluate given age  -can workup for TAVR if not a surgical candidate     NSTEMI (non-ST elevated myocardial infarction)  -acs protocol  -see CAD    Essential hypertension  -bp control per primary     Coronary artery disease involving native coronary artery with unstable angina pectoris  -known 3V disease  -CTS consult, can consider pci if not a candidate     Oncology  Anemia  -stable, per HM        VTE Risk Mitigation (From admission, onward)           Ordered     IP VTE HIGH RISK PATIENT  Once         08/29/23 1409     Place sequential compression device  Until discontinued         08/29/23 1409                    Thank you for your consult. I will follow-up with patient. Please contact us if you have any additional questions.    Rick Quiroga MD  Interventional Cardiology   [facility]    Staff Attestation:  I have seen the patient, reviewed the Resident's assessment and plan, personally interviewed and examined the patient at bedside and agree with the findings.Total critical care time: Approximately 45 minutes. Due to a high probability of clinically significant, life threatening deterioration, I personally spent this critical care time directly and personally managing the patient. This critical care time included obtaining a history; examining the patient; ordering and review of studies; arranging urgent treatment with development of a management plan; evaluation of patient's response to treatment; frequent reassessment; and, discussions with other providers.   This critical care time was performed to assess and manage the high probability of imminent, life-threatening deterioration that  could result in multi-organ failure. It was medically reasonable and necessary. It was exclusive of separately billable procedures and treating other patients and teaching time.    Abraham Garcia MD  [facility] - Cardiology

## 2023-08-30 NOTE — HOSPITAL COURSE
Mr. Vance was placed in observation for acute CHF exacerbation, chest pain, and severe aortic stenosis. Pt was started on IV lasix with significant urine output (net -6L) and is now euvolemic. Cardiology consulted given complex cardiac history and chest pain and recommend continuing home DAPT. CTS surgery was consulted and recommended outpt follow with nutrition optimization; not a surgical candidate in the moment. Interventional cardiology rec'd f/u with CTS surgery, deferred PCI and agreed w/ outpt CTS f/u. Medical cardiology deemed the patient to be euvolemic.  Patient himself had improvement in shortness of breath, no recurrence of chest pain.  Patient has met maximum benefit from hospitalization and is clinically stable for discharge.    Patient was advised to follow up with Cardiothoracic surgery.      Clear lungs bilaterally, unlabored breathing, on room air, no cyanosis  Heart sounds indicate a regular rate and rhythm   No obvious lower extremity edema  Awake alert, no acute distress

## 2023-08-30 NOTE — CONSULTS
Food & Nutrition Education    Diet Education: Fluid and Salt restriction    Time Spent: 10 minutes    Learners: Patient    Handouts provided: Congestive Heart Failure Diet- including Low Na and Fluid Restriction    Comments: Discussed importance of a low sodium diet. Reviewed high sodium foods that should be avoided. Food labels, salt free seasonings, and recommended sodium intake reviewed. Encouraged healthy, fresh foods that are low in sodium that are good for consumption. Fluid intake and conversions discussed. Foods considered fluids were reviewed and encouraged to monitor. General healthy diet encouraged. All questions/concerns were addressed.    Follow-Up: Yes    Please Re-consult as needed.    Thanks!    Cassidy Sanchez, MS, RD, LDN

## 2023-08-30 NOTE — ASSESSMENT & PLAN NOTE
Chronic, controlled  - continue home lisinopril and metoprolol  - add lasix for GDMT & volume management   - monitor BP q4

## 2023-08-30 NOTE — SUBJECTIVE & OBJECTIVE
Scheduled Meds:   aspirin  81 mg Oral Daily    atorvastatin  80 mg Oral Daily    clopidogreL  75 mg Oral Daily    EScitalopram oxalate  10 mg Oral QHS    levETIRAcetam  500 mg Oral BID    lisinopriL  10 mg Oral Daily    magnesium sulfate IVPB  2 g Intravenous Q4H    metoprolol succinate  50 mg Oral Daily    nicotine  1 patch Transdermal Daily     Continuous Infusions:  PRN Meds:.acetaminophen, aluminum-magnesium hydroxide-simethicone, bisacodyL, dextrose 10%, dextrose 10%, glucagon (human recombinant), glucose, glucose, melatonin, naloxone, ondansetron, polyethylene glycol, prochlorperazine, simethicone, sodium chloride 0.9%, sodium chloride 0.9%    Current Outpatient Medications on File Prior to Encounter   Medication Sig    aspirin 81 MG Chew Take 1 tablet (81 mg total) by mouth once daily.    atorvastatin (LIPITOR) 80 MG tablet Take 1 tablet (80 mg total) by mouth once daily.    clopidogreL (PLAVIX) 75 mg tablet Take 1 tablet (75 mg total) by mouth once daily.    levETIRAcetam (KEPPRA) 500 MG Tab Take 1 tablet (500 mg total) by mouth 2 (two) times daily.    lisinopriL 10 MG tablet Take 1 tablet (10 mg total) by mouth once daily.    losartan (COZAAR) 25 MG tablet Take 25 mg by mouth once daily.    metoprolol succinate (TOPROL-XL) 25 MG 24 hr tablet Take 1 tablet (25 mg total) by mouth once daily.    nicotine (NICODERM CQ) 21 mg/24 hr Place 1 patch onto the skin once daily.    EScitalopram oxalate (LEXAPRO) 10 MG tablet Take 1 tablet (10 mg total) by mouth every evening. (Patient not taking: Reported on 8/29/2023)       Review of patient's allergies indicates:  No Known Allergies    Past Medical History:   Diagnosis Date    Basal cell carcinoma (BCC) of upper lip 6/20/2021    Cerebrovascular accident (CVA) due to thrombosis of right middle cerebral artery 8/9/2023    Coronary artery disease involving native coronary artery without angina pectoris 11/12/2015    Hyperlipidemia 11/12/2015    Hypertension     Syncope  and collapse 6/29/2023     Past Surgical History:   Procedure Laterality Date    CORONARY ANGIOGRAPHY  2015    CORONARY ANGIOGRAPHY Right 5/12/2023    Procedure: ANGIOGRAM, CORONARY ARTERY;  Surgeon: Ryan Huertas MD;  Location: [facility] CATH LAB;  Service: Cardiology;  Laterality: Right;    CORONARY ANGIOGRAPHY N/A 8/9/2023    Procedure: ANGIOGRAM, CORONARY ARTERY;  Surgeon: Mao Hernandez III, MD;  Location: [facility] CATH LAB/EP;  Service: Cardiology;  Laterality: N/A;    EXCISION OF LESION OF LIP N/A 07/23/2021    Procedure: EXCISION, LESION, LIP;  Surgeon: Cezar Ellis MD;  Location: [facility] OR 2ND FLR;  Service: ENT;  Laterality: N/A;    FLAP PROCEDURE N/A 08/12/2021    Procedure: CREATION, FREE FLAP;  Surgeon: Cezar Ellis MD;  Location: [facility] OR 2ND FLR;  Service: ENT;  Laterality: N/A;  Radial forearm    FOOT SURGERY      INSTANTANEOUS WAVE-FREE RATIO (IFR) N/A 8/9/2023    Procedure: Instantaneous Wave-Free Ratio (IFR);  Surgeon: Mao Hernandez III, MD;  Location: [facility] CATH LAB/EP;  Service: Cardiology;  Laterality: N/A;    LIP RECONSTRUCTION Bilateral 08/24/2021    Procedure: RECONSTRUCTION, LIP;  Surgeon: Cezar Ellis MD;  Location: [facility] OR 2ND FLR;  Service: ENT;  Laterality: Bilateral;    LIP RECONSTRUCTION N/A 10/08/2021    Procedure: RECONSTRUCTION, LIP;  Surgeon: Cezar Ellis MD;  Location: [facility] OR [facility] FLR;  Service: ENT;  Laterality: N/A;    NECK EXPLORATION Left 08/12/2021    Procedure: EXPLORATION, NECK;  Surgeon: Cezar Ellis MD;  Location: [facility] OR 2ND FLR;  Service: ENT;  Laterality: Left;    RIGHT HEART CATHETERIZATION Right 8/9/2023    Procedure: INSERTION, CATHETER, RIGHT HEART;  Surgeon: Mao Hernandez III, MD;  Location: [facility] CATH LAB/EP;  Service: Cardiology;  Laterality: Right;    ROTATION FLAP SURGERY  07/23/2021    Procedure: CREATION, FLAP, ROTATION;  Surgeon: Cezar Ellis MD;  Location: [facility] OR 2ND FLR;  Service: ENT;;    SKIN SPLIT  GRAFT Left 08/12/2021    Procedure: APPLICATION, GRAFT, SKIN, SPLIT-THICKNESS;  Surgeon: Cezar Ellis MD;  Location: [facility] OR [address] [place] [address];  Service: ENT;  Laterality: Left;     Family History       Problem Relation (Age of Onset)    Heart disease Father    No Known Problems Mother, Sister, Maternal Grandmother, Maternal Grandfather, Paternal Grandmother, Paternal Grandfather, Brother, Maternal Aunt, Maternal Uncle, Paternal Aunt, Paternal Uncle          Tobacco Use    Smoking status: Former     Current packs/day: 2.00     Average packs/day: 2.0 packs/day for 44.7 years (89.3 ttl pk-yrs)     Types: Cigarettes     Start date: 1979    Smokeless tobacco: Never    Tobacco comments:     Pt is a 2 pk/day cigarette smoker x 45 yrs.He states that he recently cut down to only a few cigarettes per day, but then increased to his usual 2 pk/day again. Pt ready to quit.Ambulatory referral to SmokingCessation clinic following hospital discharge.    Substance and Sexual Activity    Alcohol use: Not Currently    Drug use: Yes     Types: Marijuana    Sexual activity: Not on file     Review of Systems   Constitutional:  Positive for activity change.   HENT:  Negative for nosebleeds and tinnitus.    Eyes:  Negative for visual disturbance.   Respiratory:  Positive for shortness of breath.    Cardiovascular:  Positive for chest pain.   Gastrointestinal:  Negative for nausea.   Musculoskeletal:  Negative for gait problem.   Skin:  Negative for color change and pallor.   Neurological:  Positive for seizures.   Hematological:  Does not bruise/bleed easily.   Psychiatric/Behavioral:  Negative for sleep disturbance.      Objective:     Vital Signs (Most Recent):  Temp: 97.9 °F (36.6 °C) (08/30/23 0759)  Pulse: 69 (08/30/23 0759)  Resp: 18 (08/30/23 0759)  BP: 122/62 (08/30/23 0809)  SpO2: 95 % (08/30/23 0759) Vital Signs (24h Range):  Temp:  [97.9 °F (36.6 °C)-99.2 °F (37.3 °C)] 97.9 °F (36.6 °C)  Pulse:  [66-75] 69  Resp:  [14-18]  "18  SpO2:  [95 %-100 %] 95 %  BP: (102-125)/(54-65) 122/62     Weight: 64.9 kg (143 lb)  Body mass index is 17.87 kg/m².    SpO2: 95 %        Intake/Output - Last 3 Shifts         08/28 0700  08/29 0659 08/29 0700 08/30 0659 08/30 0700 08/31 0659    I.V. (mL/kg)  50 (0.8)     IV Piggyback  200     Total Intake(mL/kg)  250 (3.8)     Urine (mL/kg/hr)  6200     Total Output  6200     Net  -5950                     Lines/Drains/Airways       Peripheral Intravenous Line  Duration                  Peripheral IV - Single Lumen 08/29/23 20 G Left Forearm 1 day         Peripheral IV - Single Lumen 08/29/23 1629 18 G Right Antecubital <1 day                        Physical Exam  Vitals reviewed.   Constitutional:       General: He is not in acute distress.     Appearance: He is well-developed.      Comments: Appears much older than stated age   Very thin    HENT:      Head: Normocephalic and atraumatic.      Mouth/Throat:      Comments: Poor dentition   Eyes:      Pupils: Pupils are equal, round, and reactive to light.   Neck:      Vascular: No JVD.   Cardiovascular:      Rate and Rhythm: Normal rate.   Pulmonary:      Effort: Pulmonary effort is normal. No respiratory distress.   Abdominal:      General: There is no distension.   Musculoskeletal:         General: Normal range of motion.      Cervical back: Normal range of motion.   Skin:     Findings: Bruising present.   Neurological:      General: No focal deficit present.      Mental Status: He is alert.   Psychiatric:         Speech: Speech normal.         Behavior: Behavior normal.         Thought Content: Thought content normal.         Judgment: Judgment normal.            Significant Labs:  ABGs: No results for input(s): "PH", "PCO2", "PO2", "HCO3", "POCSATURATED", "BE" in the last 48 hours.  Amylase: No results for input(s): "AMYLASE" in the last 48 hours.  BMP:   Recent Labs   Lab 08/29/23  1138 08/29/23  1900 08/30/23  0627   GLU 86  --  91     --  139   K " "2.7*   < > 3.5     --  100   CO2 22*  --  28   BUN 6  --  8   CREATININE 0.7  --  0.8   CALCIUM 7.8*  --  8.7   MG 1.2*  --   --     < > = values in this interval not displayed.     Cardiac markers:   Recent Labs   Lab 08/29/23  1630   TROPONINI 0.094*     CBC:   Recent Labs   Lab 08/29/23  1138 08/29/23  1515   WBC 7.17  --    RBC 3.27*  --    HGB 10.8*  --    HCT 32.2* 35*     --    MCV 99*  --    MCH 33.0*  --    MCHC 33.5  --      CMP:   Recent Labs   Lab 08/29/23  1138 08/29/23  1900 08/30/23  0627   GLU 86  --  91   CALCIUM 7.8*  --  8.7   ALBUMIN 2.8*  --   --    PROT 4.9*  --   --      --  139   K 2.7*   < > 3.5   CO2 22*  --  28     --  100   BUN 6  --  8   CREATININE 0.7  --  0.8   ALKPHOS 72  --   --    ALT 8*  --   --    AST 12  --   --    BILITOT 1.4*  --   --     < > = values in this interval not displayed.     Coagulation: No results for input(s): "PT", "INR", "APTT" in the last 48 hours.  Lactic Acid: No results for input(s): "LACTATE" in the last 48 hours.  LFTs:   Recent Labs   Lab 08/29/23  1138   ALT 8*   AST 12   ALKPHOS 72   BILITOT 1.4*   PROT 4.9*   ALBUMIN 2.8*     Lipase:   Recent Labs   Lab 08/29/23  1138   LIPASE 4       Significant Diagnostics: reviewed   [facility] 8/9/2023    There was three vessel coronary artery disease with a  of the mid to distal RCA with L to R collaterals from the LAD, 75% discrete stenosis of the prox LCx, and a 60% prox LAD stenosis with positive iFR of 0.52.    Elevated right heart pressures due to elevated left heart pressures, (mPAP 29 mmHg, PCWP 25 mmHg).    Normal cardiac output and index (CO 4.72 L/min, CI 2.47 L/min/m^2).    The Prox Cx lesion was 75% stenosed.    The Prox LAD lesion was 60% stenosed.    The Prox RCA to Dist RCA lesion was 100% stenosed.    The estimated blood loss was <50 mL.    CT surgery evaluation for possible CABG, SAVR, and MV repair/replacement.    MRI Brain 8/8/2023  Mild microvascular small vessel ischemic " change.  1 of these punctate areas of microvascular small vessel ischemic change is acute based on diffusion-weighted imaging.    TTE 8/7/2023    Left Ventricle: regional wall motion abnormalities present. There is low normal systolic function. Ejection fraction by visual approximation is 50%. There is diastolic dysfunction.    Right Ventricle: Normal right ventricular cavity size. Systolic function is normal.    Left Atrium: Left atrium is mildly dilated.    Aortic Valve: There is moderate aortic valve sclerosis. There is severe stenosis. Aortic valve area by VTI is 0.55 cm². Aortic valve peak velocity is 3.8 m/s. Mean gradient is 40 mmHg. The dimensionless index is 0.18. There is moderate aortic regurgitation.    Mitral Valve: Findings are consistent with rheumatic disease. There is moderate bileaflet sclerosis. There is mild stenosis. The mean pressure gradient across the mitral valve is 8 mmHg at a heart rate of 96 bpm. There is moderate regurgitation.    Pulmonic Valve: There is no significant stenosis.    IVC/SVC: Intermediate venous pressure at 8 mmHg.    CTA Head and Neck 8/6/2023  No acute intracranial abnormality.     Prior described left caudate lacunar type infarct is not definitely visualized on this post-contrast exam and possibly previously represented artifact on the earlier noncontrast exam.     Significant stenosis involving a short segment of the left subclavian artery distal to the origin with diminutive but patent left vertebral artery.     50% stenosis at the origin of the left common carotid artery.  Suspected artifact and non visualization of a segment of the left proximal common carotid artery around the level of the clavicle with significant stenosis not reliably exclude in this region.  50% stenosis at the carotid bifurcation on the left.     No other focal cervical or intracranial stenosis.  No aneurysm or dissection.

## 2023-08-30 NOTE — PROGRESS NOTES
"[facility] [address] [place] [address] Medicine  Progress Note    Patient Name: Paresh Vance Jr.  MRN: [id number]  Patient Class: OP- Observation   Admission Date: 8/29/2023  Length of Stay: 0 days  Attending Physician: Taran Lopez MD  Primary Care Provider: Gloria Shahid MD        Subjective:     Principal Problem:Chest pain        HPI:  Paresh Vance Jr. is a 57 y.o. M with a PMHx of severe AS, triple vessel CAD (via cath in the last year), CHF, CVA, HLD, HTN, tobacco abuse who presents to [facility] for evaluation of chest pain. Patient reports substernal chest pain woke him up from sleep around 12AM last night. He describes pain as "sharp and intense" and says it felt like he was unable to take a deep breath. Endorses associated shortness of breath, diaphoresis, right shoulder pain, and nausea with 4 episodes of vomiting. Pain improved from 10/10 to 3/10 after he was given SL nitroglycerin x3 and ASA 325mg by EMS. Currently chest pain free. Denies fever, chills, LE swelling, orthopnea, HA, vision changes, numbness/tingling or syncope.    In the ED, AFVSS. No leukocytosis. K 2.7, Mag 1.2, t.bili 1.4. Trop 0.080>>0.074. BNP 2619. EKG without acute ST/T wave changes. CXR read as "pulmonary edema, pneumonia, aspiration, or sepsis". Given lasix 80mg IVP x1. Patient admitted to hospital medicine for further evaluation and management.       Overview/Hospital Course:  Mr. Vance was placed in observation for acute CHF exacerbation, chest pain, and severe aortic stenosis. Pt was started on IV lasix with significant urine output (net -6L) and is now euvolemic. Cardiology consulted given complex cardiac history and chest pain and recommend continuing home DAPT and consulting interventional cardiology and CTS consult for consideration of CABG vs PCI vs TAVR while inpatient.       Interval History: Pt seen and examined by me this morning. VSSAF. NAEON. Pt reports feeling well. Chest pain and SOB have improved with " initiation of IV lasix & the patient is now euvolemic. Net negative 6L since admission.     Review of Systems   Constitutional:  Positive for activity change. Negative for chills and fever.   HENT:  Negative for trouble swallowing.    Eyes:  Negative for photophobia and visual disturbance.   Respiratory:  Positive for shortness of breath. Negative for chest tightness and wheezing.    Cardiovascular:  Positive for chest pain. Negative for palpitations and leg swelling.   Gastrointestinal:  Negative for abdominal pain, constipation, diarrhea, nausea and vomiting.   Genitourinary:  Negative for dysuria, frequency, hematuria and urgency.   Musculoskeletal:  Positive for arthralgias. Negative for back pain and gait problem.   Skin:  Negative for color change and rash.   Neurological:  Negative for dizziness, syncope, weakness, light-headedness, numbness and headaches.   Psychiatric/Behavioral:  Negative for agitation and confusion. The patient is not nervous/anxious.      Objective:     Vital Signs (Most Recent):  Temp: 97.9 °F (36.6 °C) (08/30/23 1626)  Pulse: 69 (08/30/23 1626)  Resp: 17 (08/30/23 1626)  BP: (!) 113/59 (08/30/23 1626)  SpO2: (!) 94 % (08/30/23 1626) Vital Signs (24h Range):  Temp:  [97.9 °F (36.6 °C)-98.8 °F (37.1 °C)] 97.9 °F (36.6 °C)  Pulse:  [60-72] 69  Resp:  [14-18] 17  SpO2:  [94 %-100 %] 94 %  BP: (102-122)/(54-64) 113/59     Weight: 64.9 kg (143 lb)  Body mass index is 17.87 kg/m².    Intake/Output Summary (Last 24 hours) at 8/30/2023 1718  Last data filed at 8/30/2023 0644  Gross per 24 hour   Intake 150 ml   Output 2880 ml   Net -2730 ml         Physical Exam  Vitals and nursing note reviewed.   Constitutional:       General: He is not in acute distress.     Appearance: He is well-developed. He is cachectic. He is ill-appearing.   HENT:      Head: Normocephalic and atraumatic.      Mouth/Throat:      Mouth: Mucous membranes are moist.      Dentition: Abnormal dentition.   Eyes:       Extraocular Movements: Extraocular movements intact.      Conjunctiva/sclera: Conjunctivae normal.   Neck:      Vascular: No JVD.   Cardiovascular:      Rate and Rhythm: Normal rate and regular rhythm.      Heart sounds: Normal heart sounds.   Pulmonary:      Effort: Pulmonary effort is normal. No respiratory distress.      Breath sounds: No wheezing or rales.   Abdominal:      General: Bowel sounds are normal. There is no distension.      Palpations: Abdomen is soft.      Tenderness: There is no abdominal tenderness.   Musculoskeletal:         General: No tenderness. Normal range of motion.      Cervical back: Normal range of motion and neck supple.      Right lower leg: No edema.      Left lower leg: No edema.   Skin:     General: Skin is warm and dry.      Capillary Refill: Capillary refill takes less than 2 seconds.      Findings: Bruising present.   Neurological:      General: No focal deficit present.      Mental Status: He is alert and oriented to person, place, and time. Mental status is at baseline.      Cranial Nerves: No cranial nerve deficit.      Sensory: No sensory deficit.      Coordination: Coordination normal.   Psychiatric:         Behavior: Behavior normal.         Thought Content: Thought content normal.         Judgment: Judgment normal.             Significant Labs: All pertinent labs within the past 24 hours have been reviewed.    Significant Imaging: I have reviewed all pertinent imaging results/findings within the past 24 hours.      Assessment/Plan:      * Chest pain  Aortic valve regurgitation  Severe mitral regurgitation  Aortic valve stenosis  Coronary artery disease involving native coronary artery with unstable angina pectoris  Hx of NSTEMI  57 y.o. presenting with chest pain consistent with prior episodes of angina vs severe aortic stenosis. Pain is not reproducible on exam.  - Relieved with nitroglycerin, continue PRN  -  administered with EMS  - VSSAF   - Tn 0.074 -> 0.094  -  BNP 2,619  - EKG showed NSR and LVH  - CXR c/w pulmonary edema   - Pt was diuresed and is now euvolemic   - Cardiology consulted given complex cardiac history and chest pain and recommend continuing home DAPT and consulting interventional cardiology and CTS consult for consideration of CABG vs PCI vs TAVR while inpatient  - Replace electrolytes, keep Mag > 2 and K >4  - STAT trop and ekg if chest pain occurs  - Continue ASA, statin, plavix, BB  - NPO at midnight as precuation   - CBC, CMP (goal Mag>2, K>4) daily; lipid panel ordered/reviewed   - Monitor telemetry  - Echo reviewed and below  - HEART score 6  - If troponin rises, start ACS protocol w/ heparin gtt and consult interventional cardiology    Anemia  - CBC reviewed-   Lab Results   Component Value Date    HGB 10.8 (L) 08/29/2023    HCT 35 (L) 08/29/2023   - Patient's anemia is currently controlled. S/p 0 units of PRBCs.  - Anemia panel pending  - Follow with daily CBC  - Obtain type and screen and transfuse if Hgb <7, Hct <21, or patient is acutely symptomatic    Hypomagnesemia  - Magnesium reviewed- Recent Labs   Lab 08/29/23  1138   MG 1.2*   - Will replace electrolytes and continue to monitor closely.    Hypokalemia  - Patient has hypokalemia which is currently controlled.   - Last electrolytes reviewed-   Recent Labs   Lab 08/29/23  1138 08/29/23  1900 08/30/23  0627   K 2.7* 3.7 3.5   - Will replace potassium and monitor electrolytes closely.   - Continuous telemetry.    Acute on chronic diastolic heart failure  Resolved - now euvolemic  - CHF Pathway initiated at admission  - BNP 2619 and CXR with pulmonary edema  - IV diuresis with Lasix 40 mg IV BID with significant urinary response net -6L  - repeat echo reviewed and below   - Strict I&Os with daily weights  - Low sodium diet with 1.5L fluid restriction  - Continue PO lasix 40 mg daily   Results for orders placed during the hospital encounter of 08/29/23  Echo  Interpretation Summary     Left Ventricle: The left ventricle is moderately dilated. Normal wall thickness. There is mild eccentric hypertrophy. Normal wall motion. There is normal systolic function with a visually estimated ejection fraction of 55 - 60%.    Left Atrium: Left atrium is severely dilated.    Right Ventricle: Normal right ventricular cavity size. There is hypertrophy. Systolic function is normal.    Aortic Valve: Moderate to severe stenosis. Aortic valve area by VTI is 1.00 cm². Aortic valve peak velocity is 4.07 m/s. Mean gradient is 43 mmHg. The dimensionless index is 0.26. There is moderate aortic regurgitation. Findings consistent with rheumatic valve disease.    Mitral Valve: Moderately thickened leaflets. Moderately thickened posterior subvalvular apparatus. There is severe regurgitation. Findings consistent with rheumatic valve disease.    Pulmonary Artery: The estimated pulmonary artery systolic pressure is 26 mmHg.    IVC/SVC: Normal venous pressure at 3 mmHg.    Pericardium: There is a small effusion adjacent to the right atrium.    Cerebrovascular accident (CVA) due to thrombosis of right middle cerebral artery  - hx noted  - continue home plavix and aspirin    Vitamin D deficiency  - continue supplementation     Essential hypertension  Chronic, controlled  - continue home lisinopril and metoprolol  - add lasix for GDMT & volume management   - monitor BP q4    Nicotine dependence in remission  Dangers of cigarette smoking were reviewed with patient in detail for 10 minutes and patient quit smoking 1 month ago. Nicotine replacement options were discussed and ordered.     Renal artery stenosis  - chronic issue  - tight BP control       VTE Risk Mitigation (From admission, onward)         Ordered     IP VTE HIGH RISK PATIENT  Once         08/29/23 1409     Place sequential compression device  Until discontinued         08/29/23 1409                Discharge Planning   GEOVANNI: 8/31/2023     Code Status: Full Code   Is  the patient medically ready for discharge?: No    Reason for patient still in hospital (select all that apply): Patient trending condition, Treatment, Imaging and Consult recommendations  Discharge Plan A: Home, Home with family, Home Health                  Lor Villarreal PA-C  Department of Hospital Medicine   [facility] 11H

## 2023-08-30 NOTE — ASSESSMENT & PLAN NOTE
- Magnesium reviewed- Recent Labs   Lab 08/29/23  1138   MG 1.2*   - Will replace electrolytes and continue to monitor closely.

## 2023-08-30 NOTE — ASSESSMENT & PLAN NOTE
Aortic valve regurgitation  Severe mitral regurgitation  Aortic valve stenosis  Coronary artery disease involving native coronary artery with unstable angina pectoris  Hx of NSTEMI  57 y.o. presenting with chest pain consistent with prior episodes of angina vs severe aortic stenosis. Pain is not reproducible on exam.  - Relieved with nitroglycerin, continue PRN  -  administered with EMS  - VSSAF   - Tn 0.074 -> 0.094  - BNP 2,619  - EKG showed NSR and LVH  - CXR c/w pulmonary edema   - Pt was diuresed and is now euvolemic   - Cardiology consulted given complex cardiac history and chest pain and recommend continuing home DAPT and consulting interventional cardiology and CTS consult for consideration of CABG vs PCI vs TAVR while inpatient  - Replace electrolytes, keep Mag > 2 and K >4  - STAT trop and ekg if chest pain occurs  - Continue ASA, statin, plavix, BB  - NPO at midnight as precuation   - CBC, CMP (goal Mag>2, K>4) daily; lipid panel ordered/reviewed   - Monitor telemetry  - Echo reviewed and below  - HEART score 6  - If troponin rises, start ACS protocol w/ heparin gtt and consult interventional cardiology

## 2023-08-30 NOTE — PLAN OF CARE
Problem: Adult Inpatient Plan of Care  Goal: Plan of Care Review  Outcome: Ongoing, Progressing  Goal: Patient-Specific Goal (Individualized)  Outcome: Ongoing, Progressing  Goal: Absence of Hospital-Acquired Illness or Injury  Outcome: Ongoing, Progressing  Goal: Optimal Comfort and Wellbeing  Outcome: Ongoing, Progressing  Goal: Readiness for Transition of Care  Outcome: Ongoing, Progressing     Problem: Infection  Goal: Absence of Infection Signs and Symptoms  Outcome: Ongoing, Progressing     Problem: Fall Injury Risk  Goal: Absence of Fall and Fall-Related Injury  Outcome: Ongoing, Progressing     Problem: Adjustment to Illness (Heart Failure)  Goal: Optimal Coping  Outcome: Ongoing, Progressing     Problem: Cardiac Output Decreased (Heart Failure)  Goal: Optimal Cardiac Output  Outcome: Ongoing, Progressing     Problem: Dysrhythmia (Heart Failure)  Goal: Stable Heart Rate and Rhythm  Outcome: Ongoing, Progressing     Problem: Fluid Imbalance (Heart Failure)  Goal: Fluid Balance  Outcome: Ongoing, Progressing     Problem: Functional Ability Impaired (Heart Failure)  Goal: Optimal Functional Ability  Outcome: Ongoing, Progressing     Problem: Oral Intake Inadequate (Heart Failure)  Goal: Optimal Nutrition Intake  Outcome: Ongoing, Progressing     Problem: Respiratory Compromise (Heart Failure)  Goal: Effective Oxygenation and Ventilation  Outcome: Ongoing, Progressing     Problem: Sleep Disordered Breathing (Heart Failure)  Goal: Effective Breathing Pattern During Sleep  Outcome: Ongoing, Progressing

## 2023-08-30 NOTE — CONSULTS
[facility]  Cardiothoracic Surgery  Consult Note    Patient Name: Paresh Vance Jr.  MRN: [id number]  Admission Date: 8/29/2023  Attending Physician: Taran Lopez MD  Referring Provider: Self, Aaareferral    Patient information was obtained from patient, past medical records and ER records.     Inpatient consult to Cardiothoracic Surgery  Consult performed by: Katelin Jose PA-C  Consult ordered by: Rick Quiroga MD        Subjective:     Principal Problem: Acute on chronic diastolic heart failure    History of Present Illness: Paresh Vance is a 57 years old man with severe AS, mild MS + moderate MR, triple vessel CAD (pLAD, pLCx,  mRCA), Subclavian steal (Saw  for subclavian stenosis and 50% left CCA - to follow with careful watching), HTN, Smoking (reports to quit now), HLD, moderate MR, prior stroke (8/9/2023 per chart)  who presented to the ED with chest pain and CHF. He has been found to have an NSTEMI with mild troponin elevation. Cardiology was consulted overnight and recommended diuresis and IC consult for evaluation of angiogram/TAVR workup. Of note, he previously saw CTS and was deemed to not be a surgical candidate for AVR + CABG.          Scheduled Meds:   aspirin  81 mg Oral Daily    atorvastatin  80 mg Oral Daily    clopidogreL  75 mg Oral Daily    EScitalopram oxalate  10 mg Oral QHS    levETIRAcetam  500 mg Oral BID    lisinopriL  10 mg Oral Daily    magnesium sulfate IVPB  2 g Intravenous Q4H    metoprolol succinate  50 mg Oral Daily    nicotine  1 patch Transdermal Daily     Continuous Infusions:  PRN Meds:.acetaminophen, aluminum-magnesium hydroxide-simethicone, bisacodyL, dextrose 10%, dextrose 10%, glucagon (human recombinant), glucose, glucose, melatonin, naloxone, ondansetron, polyethylene glycol, prochlorperazine, simethicone, sodium chloride 0.9%, sodium chloride 0.9%    Current Outpatient Medications on File Prior to Encounter   Medication Sig     aspirin 81 MG Chew Take 1 tablet (81 mg total) by mouth once daily.    atorvastatin (LIPITOR) 80 MG tablet Take 1 tablet (80 mg total) by mouth once daily.    clopidogreL (PLAVIX) 75 mg tablet Take 1 tablet (75 mg total) by mouth once daily.    levETIRAcetam (KEPPRA) 500 MG Tab Take 1 tablet (500 mg total) by mouth 2 (two) times daily.    lisinopriL 10 MG tablet Take 1 tablet (10 mg total) by mouth once daily.    losartan (COZAAR) 25 MG tablet Take 25 mg by mouth once daily.    metoprolol succinate (TOPROL-XL) 25 MG 24 hr tablet Take 1 tablet (25 mg total) by mouth once daily.    nicotine (NICODERM CQ) 21 mg/24 hr Place 1 patch onto the skin once daily.    EScitalopram oxalate (LEXAPRO) 10 MG tablet Take 1 tablet (10 mg total) by mouth every evening. (Patient not taking: Reported on 8/29/2023)       Review of patient's allergies indicates:  No Known Allergies    Past Medical History:   Diagnosis Date    Basal cell carcinoma (BCC) of upper lip 6/20/2021    Cerebrovascular accident (CVA) due to thrombosis of right middle cerebral artery 8/9/2023    Coronary artery disease involving native coronary artery without angina pectoris 11/12/2015    Hyperlipidemia 11/12/2015    Hypertension     Syncope and collapse 6/29/2023     Past Surgical History:   Procedure Laterality Date    CORONARY ANGIOGRAPHY  2015    CORONARY ANGIOGRAPHY Right 5/12/2023    Procedure: ANGIOGRAM, CORONARY ARTERY;  Surgeon: Ryan Huertas MD;  Location[facility] CATH LAB;  Service: Cardiology;  Laterality: Right;    CORONARY ANGIOGRAPHY N/A 8/9/2023    Procedure: ANGIOGRAM, CORONARY ARTERY;  Surgeon: Mao Hernandez III, MD;  Location: [facility] CATH LAB/EP;  Service: Cardiology;  Laterality: N/A;    EXCISION OF LESION OF LIP N/A 07/23/2021    Procedure: EXCISION, LESION, LIP;  Surgeon: Cezar Ellis MD;  Location: [facility] OR [address] [place] [address];  Service: ENT;  Laterality: N/A;    FLAP PROCEDURE N/A 08/12/2021    Procedure: CREATION, FREE FLAP;  Surgeon:  Cezar Ellis MD;  Location: [facility] OR [facility] FLR;  Service: ENT;  Laterality: N/A;  Radial forearm    FOOT SURGERY      INSTANTANEOUS WAVE-FREE RATIO (IFR) N/A 8/9/2023    Procedure: Instantaneous Wave-Free Ratio (IFR);  Surgeon: Mao Hernandez III, MD;  Location: [facility] CATH LAB/EP;  Service: Cardiology;  Laterality: N/A;    LIP RECONSTRUCTION Bilateral 08/24/2021    Procedure: RECONSTRUCTION, LIP;  Surgeon: Cezar Ellis MD;  Location: [facility] OR [facility] FLR;  Service: ENT;  Laterality: Bilateral;    LIP RECONSTRUCTION N/A 10/08/2021    Procedure: RECONSTRUCTION, LIP;  Surgeon: Cezar Ellis MD;  Location: [facility] OR [facility] FLR;  Service: ENT;  Laterality: N/A;    NECK EXPLORATION Left 08/12/2021    Procedure: EXPLORATION, NECK;  Surgeon: Cezar Ellis MD;  [facility];  Service: ENT;  Laterality: Left;    RIGHT HEART CATHETERIZATION Right 8/9/2023    Procedure: INSERTION, CATHETER, RIGHT HEART;  Surgeon: Mao Hernandez III, MD;  Location: [facility] CATH LAB/EP;  Service: Cardiology;  Laterality: Right;    ROTATION FLAP SURGERY  07/23/2021    Procedure: CREATION, FLAP, ROTATION;  Surgeon: Cezar Ellis MD;  [facility];  Service: ENT;;    SKIN SPLIT GRAFT Left 08/12/2021    Procedure: APPLICATION, GRAFT, SKIN, SPLIT-THICKNESS;  Surgeon: Cezar Ellis MD;  [facility];  Service: ENT;  Laterality: Left;     Family History       Problem Relation (Age of Onset)    Heart disease Father    No Known Problems Mother, Sister, Maternal Grandmother, Maternal Grandfather, Paternal Grandmother, Paternal Grandfather, Brother, Maternal Aunt, Maternal Uncle, Paternal Aunt, Paternal Uncle          Tobacco Use    Smoking status: Former     Current packs/day: 2.00     Average packs/day: 2.0 packs/day for 44.7 years (89.3 ttl pk-yrs)     Types: Cigarettes     Start date: 1979    Smokeless tobacco: Never    Tobacco comments:     Pt is a 2 pk/day cigarette smoker x  45 yrs.He states that he recently cut down to only a few cigarettes per day, but then increased to his usual 2 pk/day again. Pt ready to quit.Ambulatory referral to SmokingCessation clinic following hospital discharge.    Substance and Sexual Activity    Alcohol use: Not Currently    Drug use: Yes     Types: Marijuana    Sexual activity: Not on file     Review of Systems   Constitutional:  Positive for activity change.   HENT:  Negative for nosebleeds and tinnitus.    Eyes:  Negative for visual disturbance.   Respiratory:  Positive for shortness of breath.    Cardiovascular:  Positive for chest pain.   Gastrointestinal:  Negative for nausea.   Musculoskeletal:  Negative for gait problem.   Skin:  Negative for color change and pallor.   Neurological:  Positive for seizures.   Hematological:  Does not bruise/bleed easily.   Psychiatric/Behavioral:  Negative for sleep disturbance.      Objective:     Vital Signs (Most Recent):  Temp: 97.9 °F (36.6 °C) (08/30/23 0759)  Pulse: 69 (08/30/23 0759)  Resp: 18 (08/30/23 0759)  BP: 122/62 (08/30/23 0809)  SpO2: 95 % (08/30/23 0759) Vital Signs (24h Range):  Temp:  [97.9 °F (36.6 °C)-99.2 °F (37.3 °C)] 97.9 °F (36.6 °C)  Pulse:  [66-75] 69  Resp:  [14-18] 18  SpO2:  [95 %-100 %] 95 %  BP: (102-125)/(54-65) 122/62     Weight: 64.9 kg (143 lb)  Body mass index is 17.87 kg/m².    SpO2: 95 %        Intake/Output - Last 3 Shifts         08/28 0700  08/29 0659 08/29 0700 08/30 0659 08/30 0700 08/31 0659    I.V. (mL/kg)  50 (0.8)     IV Piggyback  200     Total Intake(mL/kg)  250 (3.8)     Urine (mL/kg/hr)  6200     Total Output  6200     Net  -5950                     Lines/Drains/Airways       Peripheral Intravenous Line  Duration                  Peripheral IV - Single Lumen 08/29/23 20 G Left Forearm 1 day         Peripheral IV - Single Lumen 08/29/23 1629 18 G Right Antecubital <1 day                        Physical Exam  Vitals reviewed.   Constitutional:       General: He is  "not in acute distress.     Appearance: He is well-developed.      Comments: Appears much older than stated age   Very thin    HENT:      Head: Normocephalic and atraumatic.      Mouth/Throat:      Comments: Poor dentition   Eyes:      Pupils: Pupils are equal, round, and reactive to light.   Neck:      Vascular: No JVD.   Cardiovascular:      Rate and Rhythm: Normal rate.   Pulmonary:      Effort: Pulmonary effort is normal. No respiratory distress.   Abdominal:      General: There is no distension.   Musculoskeletal:         General: Normal range of motion.      Cervical back: Normal range of motion.   Skin:     Findings: Bruising present.   Neurological:      General: No focal deficit present.      Mental Status: He is alert.   Psychiatric:         Speech: Speech normal.         Behavior: Behavior normal.         Thought Content: Thought content normal.         Judgment: Judgment normal.            Significant Labs:  ABGs: No results for input(s): "PH", "PCO2", "PO2", "HCO3", "POCSATURATED", "BE" in the last 48 hours.  Amylase: No results for input(s): "AMYLASE" in the last 48 hours.  BMP:   Recent Labs   Lab 08/29/23 1138 08/29/23 1900 08/30/23  0627   GLU 86  --  91     --  139   K 2.7*   < > 3.5     --  100   CO2 22*  --  28   BUN 6  --  8   CREATININE 0.7  --  0.8   CALCIUM 7.8*  --  8.7   MG 1.2*  --   --     < > = values in this interval not displayed.     Cardiac markers:   Recent Labs   Lab 08/29/23  1630   TROPONINI 0.094*     CBC:   Recent Labs   Lab 08/29/23 1138 08/29/23  1515   WBC 7.17  --    RBC 3.27*  --    HGB 10.8*  --    HCT 32.2* 35*     --    MCV 99*  --    MCH 33.0*  --    MCHC 33.5  --      CMP:   Recent Labs   Lab 08/29/23 1138 08/29/23 1900 08/30/23  0627   GLU 86  --  91   CALCIUM 7.8*  --  8.7   ALBUMIN 2.8*  --   --    PROT 4.9*  --   --      --  139   K 2.7*   < > 3.5   CO2 22*  --  28     --  100   BUN 6  --  8   CREATININE 0.7  --  0.8   ALKPHOS 72 " " --   --    ALT 8*  --   --    AST 12  --   --    BILITOT 1.4*  --   --     < > = values in this interval not displayed.     Coagulation: No results for input(s): "PT", "INR", "APTT" in the last 48 hours.  Lactic Acid: No results for input(s): "LACTATE" in the last 48 hours.  LFTs:   Recent Labs   Lab 08/29/23  1138   ALT 8*   AST 12   ALKPHOS 72   BILITOT 1.4*   PROT 4.9*   ALBUMIN 2.8*     Lipase:   Recent Labs   Lab 08/29/23  1138   LIPASE 4       Significant Diagnostics: reviewed   [facility] 8/9/2023    There was three vessel coronary artery disease with a  of the mid to distal RCA with L to R collaterals from the LAD, 75% discrete stenosis of the prox LCx, and a 60% prox LAD stenosis with positive iFR of 0.52.    Elevated right heart pressures due to elevated left heart pressures, (mPAP 29 mmHg, PCWP 25 mmHg).    Normal cardiac output and index (CO 4.72 L/min, CI 2.47 L/min/m^2).    The Prox Cx lesion was 75% stenosed.    The Prox LAD lesion was 60% stenosed.    The Prox RCA to Dist RCA lesion was 100% stenosed.    The estimated blood loss was <50 mL.    CT surgery evaluation for possible CABG, SAVR, and MV repair/replacement.    MRI Brain 8/8/2023  Mild microvascular small vessel ischemic change.  1 of these punctate areas of microvascular small vessel ischemic change is acute based on diffusion-weighted imaging.    TTE 8/7/2023    Left Ventricle: regional wall motion abnormalities present. There is low normal systolic function. Ejection fraction by visual approximation is 50%. There is diastolic dysfunction.    Right Ventricle: Normal right ventricular cavity size. Systolic function is normal.    Left Atrium: Left atrium is mildly dilated.    Aortic Valve: There is moderate aortic valve sclerosis. There is severe stenosis. Aortic valve area by VTI is 0.55 cm². Aortic valve peak velocity is 3.8 m/s. Mean gradient is 40 mmHg. The dimensionless index is 0.18. There is moderate aortic regurgitation.    Mitral Valve: " Findings are consistent with rheumatic disease. There is moderate bileaflet sclerosis. There is mild stenosis. The mean pressure gradient across the mitral valve is 8 mmHg at a heart rate of 96 bpm. There is moderate regurgitation.    Pulmonic Valve: There is no significant stenosis.    IVC/SVC: Intermediate venous pressure at 8 mmHg.    CTA Head and Neck 8/6/2023  No acute intracranial abnormality.     Prior described left caudate lacunar type infarct is not definitely visualized on this post-contrast exam and possibly previously represented artifact on the earlier noncontrast exam.     Significant stenosis involving a short segment of the left subclavian artery distal to the origin with diminutive but patent left vertebral artery.     50% stenosis at the origin of the left common carotid artery.  Suspected artifact and non visualization of a segment of the left proximal common carotid artery around the level of the clavicle with significant stenosis not reliably exclude in this region.  50% stenosis at the carotid bifurcation on the left.     No other focal cervical or intracranial stenosis.  No aneurysm or dissection.    Assessment/Plan:       NSTEMI (non-ST elevated myocardial infarction)  Patient admitted with chest pain and SOB. BNP 2619. Troponin 0.094. Patient is net negative 5.9L     Prior cath with multivessel disease. Echo with moderate AR, severe AS, moderate MR. Saw. Dr. Herrera in June who recommended PCI and TAVR then possible mitral clip down the road. Patient did not follow up with interventional. Says he has transportation issues. CTS consulted for second opinion.     Patient with known subclavian steal, left subclavian 50% stenosis. Possible L ICA 50% stenosis. Saw Dr. eJfferson in 2020 who recommended annual surveillance but patient was lost to follow up. Patient presented to the ER earlier this month with stroke and seizure like activity. Neurology was consulted and placed patient on Keppra. [place]  that patient was safe to have anticoagulation as infarcts were small and had low chance of hemorrhagic conversion.     The night before last patient reports having shortness of breath that woke him from sleep around midnight. He was unable to catch his breath and had associated chest pains. This lasted until 6Am so he decided to present to the ED. Reports that he was self dosing his blood pressure medications because he was unable to get back and forth to the doctor. Since admission he is net negative 5.9L.     Patient has quit smoking for around 1 month now using nicotine patches. Prior to these smoked 2-4 ppd. His BMI is low at 17. Patient is very thin and frail. Will review diagnostics with Dr. Loyola who will give final recommendations.       Update: Discussed with Dr. Loyola. He is too debilitated for surgery right now. Needs to improve his nutritional and functional status. We will plan to see him back in clinic in 2 weeks to re-evaluate. If interventional / primary team feels like he needs something during this admit then they can move forward at their discretion.      Thank you for your consult. I will follow-up with patient. Please contact us if you have any additional questions.    Katelin Jose PA-C  Cardiothoracic Surgery  [facility]    CTS Attending Note:    I have personally taken the history and examined this patient and agree with the MELODY's note as stated above.

## 2023-08-30 NOTE — HPI
Paresh Vance is a 57 years old man with severe AS, mild MS + moderate MR, triple vessel CAD (pLAD, pLCx,  mRCA), Subclavian steal (Saw  for subclavian stenosis and 50% left CCA - to follow with careful watching), HTN, Smoking (reports to quit now), HLD, moderate MR, prior stroke (8/9/2023 per chart)  who presented to the ED with chest pain and CHF. He has been found to have an NSTEMI with mild troponin elevation. Cardiology was consulted overnight and recommended diuresis and IC consult for evaluation of angiogram/TAVR workup. Of note, he previously saw CTS and was deemed to not be a surgical candidate for AVR + CABG.

## 2023-08-30 NOTE — CONSULTS
Consult Note  [facility] [place] Rashad brody  Cardiology      Paresh Vance Jr.  YOB: 1965  Medical Record Number:  [id number]  Attending Physician:  Taran Lopez MD   Date of Admission: 8/29/2023       Hospital Day:  0  Current Principal Problem:  Coronary artery disease involving native coronary artery with unstable angina pectoris    CHIEF COMPLAINT:     Cc: dyspnea and chest pain    HPI:     This is a 57 years old man with mod-sev symptomatic AS, triple vessel CAD, Subclavian steal (Saw  for subclavian stenosis and 50% left CCA - to follow with careful watching), HTN, Smoking (reports to quit now), HLD, moderate MR.     Patient presented to the emergency department yesterday with chest pain and dyspnea which woke him up from sleep the prior night.  He said his chest pain was substernal and radiated to right side of his chest and was sharp.  It was not positional and not associated with tenderness.  In the ER he was found to have sinus rhythm on EKG with normal rate, LVH with early repo changes.  Troponins have been negative.  Chest x-ray showed severe bilateral pulmonary edema along with BNP 2600.  Patient was admitted in for further evaluation and management.    RHC/LHC 8/9/23 by Dr Hernandez    There was three vessel coronary artery disease with a  of the mid to distal RCA with L to R collaterals from the LAD, 75% discrete stenosis of the prox LCx, and a 60% prox LAD stenosis with positive iFR of 0.52.    Elevated right heart pressures due to elevated left heart pressures, (mPAP 29 mmHg, PCWP 25 mmHg).    Normal cardiac output and index (CO 4.72 L/min, CI 2.47 L/min/m^2).    The Prox Cx lesion was 75% stenosed.    The Prox LAD lesion was 60% stenosed.    The Prox RCA to Dist RCA lesion was 100% stenosed.    The estimated blood loss was <50 mL.    CT surgery evaluation for possible CABG, SAVR, and MV repair/replacement.    Echo 8/7/23    Left Ventricle: regional wall motion abnormalities  present. There is low normal systolic function. Ejection fraction by visual approximation is 50%. There is diastolic dysfunction.    Right Ventricle: Normal right ventricular cavity size. Systolic function is normal.    Left Atrium: Left atrium is mildly dilated.    Aortic Valve: There is moderate aortic valve sclerosis. There is severe stenosis. Aortic valve area by VTI is 0.55 cm². Aortic valve peak velocity is 3.8 m/s. Mean gradient is 40 mmHg. The dimensionless index is 0.18. There is moderate aortic regurgitation.    Mitral Valve: Findings are consistent with rheumatic disease. There is moderate bileaflet sclerosis. There is mild stenosis. The mean pressure gradient across the mitral valve is 8 mmHg at a heart rate of 96 bpm. There is moderate regurgitation.    Pulmonic Valve: There is no significant stenosis.    IVC/SVC: Intermediate venous pressure at 8 mmHg.    Medications - Outpatient  Prior to Admission medications    Medication Sig Start Date End Date Taking? Authorizing Provider   aspirin 81 MG Chew Take 1 tablet (81 mg total) by mouth once daily. 5/14/23 5/13/24 Yes Oniel Quezada MD   atorvastatin (LIPITOR) 80 MG tablet Take 1 tablet (80 mg total) by mouth once daily. 5/14/23 5/13/24 Yes Oniel Quezada MD   clopidogreL (PLAVIX) 75 mg tablet Take 1 tablet (75 mg total) by mouth once daily. 8/11/23 8/10/24 Yes Abhinav Garcia MD   levETIRAcetam (KEPPRA) 500 MG Tab Take 1 tablet (500 mg total) by mouth 2 (two) times daily. 8/10/23 8/9/24 Yes Abhinav Garcia MD   lisinopriL 10 MG tablet Take 1 tablet (10 mg total) by mouth once daily. 8/14/23 8/13/24 Yes Gloria Shahid MD   losartan (COZAAR) 25 MG tablet Take 25 mg by mouth once daily.   Yes Provider, Historical   metoprolol succinate (TOPROL-XL) 25 MG 24 hr tablet Take 1 tablet (25 mg total) by mouth once daily. 8/11/23 8/10/24 Yes Abhinav Garcia MD   nicotine (NICODERM CQ) 21 mg/24 hr Place 1 patch onto the skin once  daily. 8/14/23  Yes Gloria Shahid MD   EScitalopram oxalate (LEXAPRO) 10 MG tablet Take 1 tablet (10 mg total) by mouth every evening.  Patient not taking: Reported on 8/29/2023 1/26/23   Gloria Shahid MD         Medications - Current  Scheduled Meds:   aspirin  81 mg Oral Daily    atorvastatin  80 mg Oral Daily    clopidogreL  75 mg Oral Daily    EScitalopram oxalate  10 mg Oral QHS    furosemide (LASIX) injection  40 mg Intravenous Q12H    levETIRAcetam  500 mg Oral BID    lisinopriL  10 mg Oral Daily    metoprolol succinate  25 mg Oral Daily    nicotine  1 patch Transdermal Daily     Continuous Infusions:  PRN Meds:.acetaminophen, aluminum-magnesium hydroxide-simethicone, bisacodyL, dextrose 10%, dextrose 10%, glucagon (human recombinant), glucose, glucose, HYDROcodone-acetaminophen, HYDROcodone-acetaminophen, melatonin, naloxone, ondansetron, polyethylene glycol, prochlorperazine, simethicone, sodium chloride 0.9%, sodium chloride 0.9%      Allergies  Review of patient's allergies indicates:  No Known Allergies      Past Medical History  Past Medical History:   Diagnosis Date    Basal cell carcinoma (BCC) of upper lip 6/20/2021    Cerebrovascular accident (CVA) due to thrombosis of right middle cerebral artery 8/9/2023    Coronary artery disease involving native coronary artery without angina pectoris 11/12/2015    Hyperlipidemia 11/12/2015    Hypertension     Syncope and collapse 6/29/2023         Past Surgical History  Past Surgical History:   Procedure Laterality Date    CORONARY ANGIOGRAPHY  2015    CORONARY ANGIOGRAPHY Right 5/12/2023    Procedure: ANGIOGRAM, CORONARY ARTERY;  Surgeon: Ryan Huertas MD;  Location: [facility] CATH LAB;  Service: Cardiology;  Laterality: Right;    CORONARY ANGIOGRAPHY N/A 8/9/2023    Procedure: ANGIOGRAM, CORONARY ARTERY;  Surgeon: Mao Hernandez III, MD;  Location: [facility] CATH LAB/EP;  Service: Cardiology;  Laterality: N/A;    EXCISION OF LESION OF LIP N/A 07/23/2021     Procedure: EXCISION, LESION, LIP;  Surgeon: Cezar Ellis MD;  Location: [facility] OR 2ND FLR;  Service: ENT;  Laterality: N/A;    FLAP PROCEDURE N/A 08/12/2021    Procedure: CREATION, FREE FLAP;  Surgeon: Cezar Ellis MD;  Location: [facility] OR 2ND FLR;  Service: ENT;  Laterality: N/A;  Radial forearm    FOOT SURGERY      INSTANTANEOUS WAVE-FREE RATIO (IFR) N/A 8/9/2023    Procedure: Instantaneous Wave-Free Ratio (IFR);  Surgeon: Mao Hernandez III, MD;  Location: [facility] CATH LAB/EP;  Service: Cardiology;  Laterality: N/A;    LIP RECONSTRUCTION Bilateral 08/24/2021    Procedure: RECONSTRUCTION, LIP;  Surgeon: Cezar Ellis MD;  Location: [facility] OR 2ND FLR;  Service: ENT;  Laterality: Bilateral;    LIP RECONSTRUCTION N/A 10/08/2021    Procedure: RECONSTRUCTION, LIP;  Surgeon: Cezar Ellis MD;  Location: [facility] OR [facility] FLR;  Service: ENT;  Laterality: N/A;    NECK EXPLORATION Left 08/12/2021    Procedure: EXPLORATION, NECK;  Surgeon: Cezar Ellis MD;  Location: [facility] OR [facility] FLR;  Service: ENT;  Laterality: Left;    RIGHT HEART CATHETERIZATION Right 8/9/2023    Procedure: INSERTION, CATHETER, RIGHT HEART;  Surgeon: Mao Hernandez III, MD;  Location: [facility] CATH LAB/EP;  Service: Cardiology;  Laterality: Right;    ROTATION FLAP SURGERY  07/23/2021    Procedure: CREATION, FLAP, ROTATION;  Surgeon: Cezar Ellis MD;  [facility];  Service: ENT;;    SKIN SPLIT GRAFT Left 08/12/2021    Procedure: APPLICATION, GRAFT, SKIN, SPLIT-THICKNESS;  Surgeon: Cezar Ellis MD;  [facility];  Service: ENT;  Laterality: Left;         Social History  Social History     Socioeconomic History    Marital status:    Occupational History    Occupation: [employer]   Tobacco Use    Smoking status: Former     Current packs/day: 2.00     Average packs/day: 2.0 packs/day for 44.7 years (89.3 ttl pk-yrs)     Types: Cigarettes     Start date: 1979     Smokeless tobacco: Never    Tobacco comments:     Pt is a 2 pk/day cigarette smoker x 45 yrs.He states that he recently cut down to only a few cigarettes per day, but then increased to his usual 2 pk/day again. Pt ready to quit.Ambulatory referral to SmokingCessation clinic following hospital discharge.    Substance and Sexual Activity    Alcohol use: Not Currently    Drug use: Yes     Types: Marijuana     Social Determinants of Health     Financial Resource Strain: Low Risk  (8/29/2023)    Overall Financial Resource Strain (CARDIA)     Difficulty of Paying Living Expenses: Not hard at all   Recent Concern: Financial Resource Strain - High Risk (8/7/2023)    Overall Financial Resource Strain (CARDIA)     Difficulty of Paying Living Expenses: Hard   Food Insecurity: No Food Insecurity (8/29/2023)    Hunger Vital Sign     Worried About Running Out of Food in the Last Year: Never true     Ran Out of Food in the Last Year: Never true   Recent Concern: Food Insecurity - Food Insecurity Present (8/7/2023)    Hunger Vital Sign     Worried About Running Out of Food in the Last Year: Sometimes true     Ran Out of Food in the Last Year: Sometimes true   Transportation Needs: No Transportation Needs (8/29/2023)    PRAPARE - Transportation     Lack of Transportation (Medical): No     Lack of Transportation (Non-Medical): No   Physical Activity: Sufficiently Active (8/29/2023)    Exercise Vital Sign     Days of Exercise per Week: 7 days     Minutes of Exercise per Session: 120 min   Recent Concern: Physical Activity - Inactive (8/7/2023)    Exercise Vital Sign     Days of Exercise per Week: 0 days     Minutes of Exercise per Session: 0 min   Stress: Stress Concern Present (8/29/2023)    [place] [employer] - Occupational Stress Questionnaire     Feeling of Stress : Rather much   Social Connections: Socially Isolated (8/29/2023)    Social Connection and Isolation Panel [NHANES]     Frequency of Communication  with Friends and Family: More than three times a week     Frequency of Social Gatherings with Friends and Family: Never     Attends Bahai Services: Never     Active Member of Clubs or Organizations: No     Attends Club or Organization Meetings: Never     Marital Status:    Housing Stability: Low Risk  (8/29/2023)    Housing Stability Vital Sign     Unable to Pay for Housing in the Last Year: No     Number of Places Lived in the Last Year: 1     Unstable Housing in the Last Year: No   Recent Concern: Housing Stability - High Risk (8/7/2023)    Housing Stability Vital Sign     Unable to Pay for Housing in the Last Year: Yes     Number of Places Lived in the Last Year: 1     Unstable Housing in the Last Year: No         ROS:      Admits Denies   Constitutional  Chills, diaphoresis, malaise   Eyes  Visual changes   ENMT  Dysphagia, Epistaxis, nasal congestion, hearing loss   Cardiovascular CP, SOB    Respiratory  Cough, wheezing   Gastrointestinal  Nausea, vomiting, constipation, diarrhea, anorexia.     Genitourinary  Dysuria, incontinence   Musculoskeletal  Myalgias, joint pain, joint swelling   Integumentary  Rash, inflammation, burning   Neruo-Psychiatric  Anxiety, insomnia.  Changes in speech, strength, sensation.     Endocrine     Hematologic  Abnormal bruising, bleeding   Immunologic  Inflammation, pain at IV sites.  Pruritis.       PHYSICAL EXAM:     Vital Signs  Vitals  Temp: 98.8 °F (37.1 °C)  Temp Source: Oral  Pulse: 69  Heart Rate Source: Monitor  Resp: 18  SpO2: 99 %  Pulse Oximetry Type: Intermittent  Flow (L/min): 2  Oxygen Concentration (%): 28  BP: 110/60  MAP (mmHg): 77  BP Location: Left arm  BP Method: Automatic  Patient Position: Lying          24 Hour VS Range    Temp:  [98.8 °F (37.1 °C)-99.2 °F (37.3 °C)]   Pulse:  [66-75]   Resp:  [14-18]   BP: (105-125)/(58-65)   SpO2:  [95 %-100 %]     Intake/Output Summary (Last 24 hours) at 8/30/2023 5277  Last data filed at 8/29/2023 8493  Gross  "per 24 hour   Intake 250 ml   Output 4200 ml   Net -3950 ml         Constitutional: He is not diaphoretic. No distress.   Chronically ill-appearing, appears older than stated age   HENT:   Head: Normocephalic and atraumatic.   Eyes: Conjunctivae and EOM are normal.   Neck: Neck supple.   Normal range of motion.  Cardiovascular:  Normal rate, regular rhythm, normal heart sounds and intact distal pulses.           Pulmonary/Chest: No respiratory distress. He has no wheezes. He has rhonchi.  Crackles bilateral  Abdominal: Abdomen is soft. Bowel sounds are normal. He exhibits no distension. There is no abdominal tenderness. There is no rebound and no guarding.   Musculoskeletal:         General: No tenderness or edema. Normal range of motion.      Cervical back: Normal range of motion and neck supple.      Neurological: He is alert. He has normal strength.   Skin: Skin is warm and dry.   Scattered old ecchymosis on bilateral extremities   Psychiatric: He has a normal mood and affect. Thought content normal.      DATA:       Recent Labs   Lab 08/29/23  1138 08/29/23  1515   WBC 7.17  --    HGB 10.8*  --    HCT 32.2* 35*     --         No results for input(s): "PROTIME", "INR" in the last 168 hours.     Recent Labs   Lab 08/23/23  1215 08/29/23  1138 08/29/23  1900    136  --    K 3.2* 2.7* 3.7    106  --    CO2 24 22*  --    BUN 8 6  --    CREATININE 0.9 0.7  --    ANIONGAP 11 8  --    CALCIUM 8.9 7.8*  --         Recent Labs   Lab 08/29/23  1138   PROT 4.9*   ALBUMIN 2.8*   BILITOT 1.4*   ALKPHOS 72   AST 12   ALT 8*        Recent Labs   Lab 08/29/23  1138 08/29/23  1630   TROPONINI 0.074*  0.080* 0.094*        BNP (pg/mL)   Date Value   08/29/2023 2,619 (H)   08/06/2023 275 (H)   05/11/2023 600 (H)       No results for input(s): "LABBLOO" in the last 168 hours.       ASSESSMENT/PLAN:     #Acute CHF  -volume up on arrival, BNP 2600 with CXR showing b/l pulmonary edema  -currently on room air  -wasn't " on any PO diuretics at home, will need maintenance diuretics upon d/c  -keep K>4, Mg>2 and tele  -update echo in AM  -on IVP lasix 40 bid per primary team, cont to sydniee    #Triple vessel CAD, mod MR and mod-sev AS  -rev'd last C/C 8/9/23 by Dr Hernandez showing above who recd'd CABG + SAVR eval  -However, he already saw Dr Herrera on 6/12/23 who deemed him not a surgical candidate due to frailty  -Dr Herrera had recd'd f/up with Dr Garcia however pt failed to f/up (reports transport issues)  -Trop here are -ve x 2 here (six hours apart)  -keep DAPT and HI statin doing due to NSTEMI few months ago  -at home takes toprol xl 25 daily, increase to 50 daily to decrease myocardial oxygen demand    Thank you for the consult. Cardiology will continue to follow      Signed:  Kaden Noel M.D. PGY-5  Cardiovascular Fellow  [facility]

## 2023-08-30 NOTE — ASSESSMENT & PLAN NOTE
- Patient has hypokalemia which is currently controlled.   - Last electrolytes reviewed-   Recent Labs   Lab 08/29/23  1138 08/29/23  1900 08/30/23  0627   K 2.7* 3.7 3.5   - Will replace potassium and monitor electrolytes closely.   - Continuous telemetry.

## 2023-08-30 NOTE — ASSESSMENT & PLAN NOTE
Patient admitted with chest pain and SOB. BNP 2619. Troponin 0.094. Patient is net negative 5.9L     Prior cath with multivessel disease. Echo with moderate AR, severe AS, moderate MR. Saw. Dr. Herrera in June who recommended PCI and TAVR then possible mitral clip down the road. CTS consulted for second opinion.       Patient with known subclavian steal, left subclavian 50% stenosis. Possible L ICA 50% stenosis. Saw Dr. Jefferson in 2020 who recommended annual surveillance but patient was lost to follow up. Patient presented to the ER earlier this month with stroke and seizure like activity. Neurology was consulted and placed patient on Keppra. [place] that patient was safe to have anticoagulation as infarcts were small and had low chance of hemorrhagic conversion.     Patient has quit smoking for around 1 month now using nicotine patches. Prior to these smoked 2-4 ppd. His BMI is low at 17. Patient is very thin and frail. Will review diagnostics with Dr. Loyola who will give final recommendations.

## 2023-08-30 NOTE — PLAN OF CARE
Plan to hold lasix for the rest of the day and recommend restarting tomorrow morning with 20 vs 40mg depending on volume status. Awaiting recs from CTS to see if patient is a surgical candidate. Per interventional, if pt is not a sx candidate, can consider PCI. We will continue to follow     Wilbur Qiu M.D.

## 2023-08-30 NOTE — PLAN OF CARE
Problem: Adult Inpatient Plan of Care  Goal: Patient-Specific Goal (Individualized)  Outcome: Ongoing, Progressing     Problem: Infection  Goal: Absence of Infection Signs and Symptoms  Outcome: Ongoing, Progressing     Problem: Fall Injury Risk  Goal: Absence of Fall and Fall-Related Injury  Outcome: Ongoing, Progressing     Problem: Adjustment to Illness (Heart Failure)  Goal: Optimal Coping  Outcome: Ongoing, Progressing     Problem: Cardiac Output Decreased (Heart Failure)  Goal: Optimal Cardiac Output  Outcome: Ongoing, Progressing     Problem: Dysrhythmia (Heart Failure)  Goal: Stable Heart Rate and Rhythm  Outcome: Ongoing, Progressing     Problem: Fluid Imbalance (Heart Failure)  Goal: Fluid Balance  Outcome: Ongoing, Progressing     Problem: Functional Ability Impaired (Heart Failure)  Goal: Optimal Functional Ability  Outcome: Ongoing, Progressing     Problem: Oral Intake Inadequate (Heart Failure)  Goal: Optimal Nutrition Intake  Outcome: Ongoing, Progressing     Problem: Respiratory Compromise (Heart Failure)  Goal: Effective Oxygenation and Ventilation  Outcome: Ongoing, Progressing     Problem: Sleep Disordered Breathing (Heart Failure)  Goal: Effective Breathing Pattern During Sleep  Outcome: Ongoing, Progressing

## 2023-08-30 NOTE — PROGRESS NOTES
"[facility]) nurse navigator notified of HFTCC candidate in need of education and introduction to 4-6 week program.      PT aao x 3 while lying in bed  NVD. Introduced self to pt as HFTCC nurse navigator.     Patient given "[facility] Home Care Guide" which includes "Daily weight and symptom tracker".  Encouraged pt to review information.      Reviewed the following key points of HFTCC program with pt and family:   1.) Take your medications as directed.    2.) Weight yourself daily   3.) Follow low salt and limited fluid diet.    4.) Stop smoking and start exercising   5.) Go to your appointments and call your team.      Pt reminded to follow Symptom tracker and to call at the onset of symptoms according to tracker.     Reviewed plan for follow up once discharged to include phone calls, in person and virtual visits to assist pt optimizing their heart failure medication regimen and encouraging healthy lifestyle modifications.  Reminded pt that program will assist them over the next 4-6 weeks and then patient will be transferred to long term care provider .  Reminded pt how to contact HFTCC navigator via phone and or via [employer].     Pt instructed appointment will be printed on hospital discharge paperwork.     Pt also reminded RN will call 48-72 hours after discharge to check on them.     Pt verbalize read back of information given.  Encouraged pt and family to read over information often and contact team with any questions or concerns.    "

## 2023-08-30 NOTE — ASSESSMENT & PLAN NOTE
Dangers of cigarette smoking were reviewed with patient in detail for 10 minutes and patient quit smoking 1 month ago. Nicotine replacement options were discussed and ordered.

## 2023-08-30 NOTE — SUBJECTIVE & OBJECTIVE
Interval History: Pt seen and examined by me this morning. SAVANNA GLROIA. Pt reports feeling well. Chest pain and SOB have improved with initiation of IV lasix & the patient is now euvolemic. Net negative 6L since admission.     Review of Systems   Constitutional:  Positive for activity change. Negative for chills and fever.   HENT:  Negative for trouble swallowing.    Eyes:  Negative for photophobia and visual disturbance.   Respiratory:  Positive for shortness of breath. Negative for chest tightness and wheezing.    Cardiovascular:  Positive for chest pain. Negative for palpitations and leg swelling.   Gastrointestinal:  Negative for abdominal pain, constipation, diarrhea, nausea and vomiting.   Genitourinary:  Negative for dysuria, frequency, hematuria and urgency.   Musculoskeletal:  Positive for arthralgias. Negative for back pain and gait problem.   Skin:  Negative for color change and rash.   Neurological:  Negative for dizziness, syncope, weakness, light-headedness, numbness and headaches.   Psychiatric/Behavioral:  Negative for agitation and confusion. The patient is not nervous/anxious.      Objective:     Vital Signs (Most Recent):  Temp: 97.9 °F (36.6 °C) (08/30/23 1626)  Pulse: 69 (08/30/23 1626)  Resp: 17 (08/30/23 1626)  BP: (!) 113/59 (08/30/23 1626)  SpO2: (!) 94 % (08/30/23 1626) Vital Signs (24h Range):  Temp:  [97.9 °F (36.6 °C)-98.8 °F (37.1 °C)] 97.9 °F (36.6 °C)  Pulse:  [60-72] 69  Resp:  [14-18] 17  SpO2:  [94 %-100 %] 94 %  BP: (102-122)/(54-64) 113/59     Weight: 64.9 kg (143 lb)  Body mass index is 17.87 kg/m².    Intake/Output Summary (Last 24 hours) at 8/30/2023 1718  Last data filed at 8/30/2023 0644  Gross per 24 hour   Intake 150 ml   Output 2880 ml   Net -2730 ml         Physical Exam  Vitals and nursing note reviewed.   Constitutional:       General: He is not in acute distress.     Appearance: He is well-developed. He is cachectic. He is ill-appearing.   HENT:      Head:  Normocephalic and atraumatic.      Mouth/Throat:      Mouth: Mucous membranes are moist.      Dentition: Abnormal dentition.   Eyes:      Extraocular Movements: Extraocular movements intact.      Conjunctiva/sclera: Conjunctivae normal.   Neck:      Vascular: No JVD.   Cardiovascular:      Rate and Rhythm: Normal rate and regular rhythm.      Heart sounds: Normal heart sounds.   Pulmonary:      Effort: Pulmonary effort is normal. No respiratory distress.      Breath sounds: No wheezing or rales.   Abdominal:      General: Bowel sounds are normal. There is no distension.      Palpations: Abdomen is soft.      Tenderness: There is no abdominal tenderness.   Musculoskeletal:         General: No tenderness. Normal range of motion.      Cervical back: Normal range of motion and neck supple.      Right lower leg: No edema.      Left lower leg: No edema.   Skin:     General: Skin is warm and dry.      Capillary Refill: Capillary refill takes less than 2 seconds.      Findings: Bruising present.   Neurological:      General: No focal deficit present.      Mental Status: He is alert and oriented to person, place, and time. Mental status is at baseline.      Cranial Nerves: No cranial nerve deficit.      Sensory: No sensory deficit.      Coordination: Coordination normal.   Psychiatric:         Behavior: Behavior normal.         Thought Content: Thought content normal.         Judgment: Judgment normal.             Significant Labs: All pertinent labs within the past 24 hours have been reviewed.    Significant Imaging: I have reviewed all pertinent imaging results/findings within the past 24 hours.

## 2023-08-30 NOTE — PLAN OF CARE
[facility] - Observation 11H  Initial Discharge Assessment       Primary Care Provider: Gloria Shahid MD    Admission Diagnosis: Shortness of breath [R06.02]  Hypokalemia [E87.6]  Hypomagnesemia [E83.42]  CHF (congestive heart failure) [I50.9]  Chest pain [R07.9]  Hypervolemia, unspecified hypervolemia type [E87.70]  Aortic valve stenosis, etiology of cardiac valve disease unspecified [I35.0]  Acute on chronic congestive heart failure, unspecified heart failure type [I50.9]    Admission Date: 8/29/2023  Expected Discharge Date: 8/31/2023    Transition of Care Barriers: (P) None    Payor: MEDICAID / Plan: [place] / Product Type: Managed Medicaid /     Extended Emergency Contact Information  Primary Emergency Contact: DOE MATIAS III  Address: [address] [place] [address] [facility]  Home Phone: [phone]  Mobile Phone: [phone]  Relation: Son   needed? No  Secondary Emergency Contact: Katie Mercer  Home Phone: [phone]  Mobile Phone: [phone]  Relation: Daughter    Discharge Plan A: (P) Home, Home with family, Home Health         [facility] [address] [place] [address]  [address] [facility] [address]  Phone: [phone] Fax: [phone]      Initial Assessment (most recent)       Adult Discharge Assessment - 08/29/23 2147          Discharge Assessment    Assessment Type Discharge Planning Assessment (P)      Confirmed/corrected address, phone number and insurance Yes (P)      Confirmed Demographics Correct on Facesheet (P)      Source of Information patient (P)      When was your last doctors appointment? -- (P)    a couple of weeks ago    Does patient/caregiver understand observation status Yes (P)      Communicated GEOVANNI with patient/caregiver Yes (P)      Reason For Admission SOB (P)      People in Home child(jb), adult (P)      Facility Arrived From: Home (P)      Do you expect to  return to your current living situation? Yes (P)      Do you have help at home or someone to help you manage your care at home? Yes (P)      Who are your caregiver(s) and their phone number(s)? Youngest son (P)      Prior to hospitilization cognitive status: Alert/Oriented (P)      Current cognitive status: Alert/Oriented (P)      Home Accessibility wheelchair accessible (P)      Home Layout Able to live on 1st floor (P)      Equipment Currently Used at Home none (P)      Readmission within 30 days? No (P)      Patient currently being followed by outpatient case management? No (P)      Do you currently have service(s) that help you manage your care at home? Yes (P)      Name and Contact number of agency [facility] (P)      Is the pt/caregiver preference to resume services with current agency Yes (P)      Do you take prescription medications? Yes (P)      Do you have prescription coverage? Yes (P)      Coverage medicaid (P)      Do you have any problems affording any of your prescribed medications? No (P)      Is the patient taking medications as prescribed? yes (P)      Who is going to help you get home at discharge? Son (P)      How do you get to doctors appointments? family or friend will provide (P)      Are you on dialysis? No (P)      Do you take coumadin? No (P)      DME Needed Upon Discharge  none (P)      Discharge Plan discussed with: Patient (P)      Transition of Care Barriers None (P)      Discharge Plan A Home;Home with family;Home Health (P)         Physical Activity    On average, how many days per week do you engage in moderate to strenuous exercise (like a brisk walk)? 7 days (P)      On average, how many minutes do you engage in exercise at this level? 120 min (P)         Financial Resource Strain    How hard is it for you to pay for the very basics like food, housing, medical care, and heating? Not hard at all (P)         Housing Stability    In the last 12 months, was there a time when you  were not able to pay the mortgage or rent on time? No (P)      In the last 12 months, how many places have you lived? 1 (P)      In the last 12 months, was there a time when you did not have a steady place to sleep or slept in a shelter (including now)? No (P)         Transportation Needs    In the past 12 months, has lack of transportation kept you from medical appointments or from getting medications? No (P)      In the past 12 months, has lack of transportation kept you from meetings, work, or from getting things needed for daily living? No (P)         Food Insecurity    Within the past 12 months, you worried that your food would run out before you got the money to buy more. Never true (P)      Within the past 12 months, the food you bought just didn't last and you didn't have money to get more. Never true (P)         Stress    Do you feel stress - tense, restless, nervous, or anxious, or unable to sleep at night because your mind is troubled all the time - these days? Rather much (P)         Social Connections    In a typical week, how many times do you talk on the phone with family, friends, or neighbors? More than three times a week (P)      How often do you get together with friends or relatives? Never (P)      How often do you attend Moravian or Baptism services? Never (P)      Do you belong to any clubs or organizations such as Moravian groups, unions, fraternal or athletic groups, or school groups? No (P)      How often do you attend meetings of the clubs or organizations you belong to? Never (P)      Are you , , , , never , or living with a partner?  (P)         Alcohol Use    Q1: How often do you have a drink containing alcohol? Never (P)      Q2: How many drinks containing alcohol do you have on a typical day when you are drinking? Patient does not drink (P)      Q3: How often do you have six or more drinks on one occasion? Never (P)         OTHER    Name(s) of  People in Home Youngest Son (P)

## 2023-08-30 NOTE — H&P
"[facility] - Emergency Dept  [facility]  History & Physical    Patient Name: Paresh Vance Jr.  MRN: [id number]  Patient Class: OP- Observation  Admission Date: 8/29/2023  Attending Physician: Taran Lopez MD   Primary Care Provider: Gloria Shahid MD         Patient information was obtained from patient, past medical records and ER records.     Subjective:     Principal Problem:Coronary artery disease involving native coronary artery with unstable angina pectoris    Chief Complaint:   Chief Complaint   Patient presents with    Chest Pain     X several weeks with progressive worsening last night, 8/28/23. Administered 3 SL NTG tabs and 324mg ASA en route via EMS with no relief. Pain score 8/10. Hx of MI        HPI: Paresh Vance Jr. is a 57 y.o. M with a PMHx of severe AS, triple vessel CAD (via cath in the last year), CHF, CVA, HLD, HTN, tobacco abuse who presents to [facility] for evaluation of chest pain. Patient reports substernal chest pain woke him up from sleep around 12AM last night. He describes pain as "sharp and intense" and says it felt like he was unable to take a deep breath. Endorses associated shortness of breath, diaphoresis, right shoulder pain, and nausea with 4 episodes of vomiting. Pain improved from 10/10 to 3/10 after he was given SL nitroglycerin x3 and ASA 325mg by EMS. Currently chest pain free. Denies fever, chills, LE swelling, orthopnea, HA, vision changes, numbness/tingling or syncope.    In the ED, AFVSS. No leukocytosis. K 2.7, Mag 1.2, t.bili 1.4. Trop 0.080>>0.074. BNP 2619. EKG without acute ST/T wave changes. CXR read as "pulmonary edema, pneumonia, aspiration, or sepsis". Given lasix 80mg IVP x1. Patient admitted to hospital medicine for further evaluation and management.       Past Medical History:   Diagnosis Date    Basal cell carcinoma (BCC) of upper lip 6/20/2021    Cerebrovascular accident (CVA) due to thrombosis of right middle cerebral artery 8/9/2023    " Coronary artery disease involving native coronary artery without angina pectoris 11/12/2015    Hyperlipidemia 11/12/2015    Hypertension     Syncope and collapse 6/29/2023       Past Surgical History:   Procedure Laterality Date    CORONARY ANGIOGRAPHY  2015    CORONARY ANGIOGRAPHY Right 5/12/2023    Procedure: ANGIOGRAM, CORONARY ARTERY;  Surgeon: Ryan Huertas MD;  Location: [facility] [place] [facility];  Service: Cardiology;  Laterality: Right;    CORONARY ANGIOGRAPHY N/A 8/9/2023    Procedure: ANGIOGRAM, CORONARY ARTERY;  Surgeon: Mao Hernandez III, MD;  Location: [facility] CATH LAB/EP;  Service: Cardiology;  Laterality: N/A;    EXCISION OF LESION OF LIP N/A 07/23/2021    Procedure: EXCISION, LESION, LIP;  Surgeon: Cezar Ellis MD;  Location: [facility] OR [facility] FLR;  Service: ENT;  Laterality: N/A;    FLAP PROCEDURE N/A 08/12/2021    Procedure: CREATION, FREE FLAP;  Surgeon: Cezar Ellis MD;  Location: [facility] OR [facility] FLR;  Service: ENT;  Laterality: N/A;  Radial forearm    FOOT SURGERY      INSTANTANEOUS WAVE-FREE RATIO (IFR) N/A 8/9/2023    Procedure: Instantaneous Wave-Free Ratio (IFR);  Surgeon: Mao Hernandez III, MD;  Location: [facility] CATH LAB/EP;  Service: Cardiology;  Laterality: N/A;    LIP RECONSTRUCTION Bilateral 08/24/2021    Procedure: RECONSTRUCTION, LIP;  Surgeon: Cezar Ellis MD;  [facility];  Service: ENT;  Laterality: Bilateral;    LIP RECONSTRUCTION N/A 10/08/2021    Procedure: RECONSTRUCTION, LIP;  Surgeon: Cezar Ellis MD;  Location: [facility] FLR;  Service: ENT;  Laterality: N/A;    NECK EXPLORATION Left 08/12/2021    Procedure: EXPLORATION, NECK;  Surgeon: Cezar Ellis MD;  [facility];  Service: ENT;  Laterality: Left;    RIGHT HEART CATHETERIZATION Right 8/9/2023    Procedure: INSERTION, CATHETER, RIGHT HEART;  Surgeon: Mao Hernandez III, MD;  Location: [facility] CATH LAB/EP;  Service: Cardiology;  Laterality: Right;     ROTATION FLAP SURGERY  07/23/2021    Procedure: CREATION, FLAP, ROTATION;  Surgeon: Cezar Ellis MD;  [facility];  Service: ENT;;    SKIN SPLIT GRAFT Left 08/12/2021    Procedure: APPLICATION, GRAFT, SKIN, SPLIT-THICKNESS;  Surgeon: Cezar Ellis MD;  [facility];  Service: ENT;  Laterality: Left;       Review of patient's allergies indicates:  No Known Allergies    No current facility-administered medications on file prior to encounter.     Current Outpatient Medications on File Prior to Encounter   Medication Sig    aspirin 81 MG Chew Take 1 tablet (81 mg total) by mouth once daily.    atorvastatin (LIPITOR) 80 MG tablet Take 1 tablet (80 mg total) by mouth once daily.    clopidogreL (PLAVIX) 75 mg tablet Take 1 tablet (75 mg total) by mouth once daily.    levETIRAcetam (KEPPRA) 500 MG Tab Take 1 tablet (500 mg total) by mouth 2 (two) times daily.    lisinopriL 10 MG tablet Take 1 tablet (10 mg total) by mouth once daily.    losartan (COZAAR) 25 MG tablet Take 25 mg by mouth once daily.    metoprolol succinate (TOPROL-XL) 25 MG 24 hr tablet Take 1 tablet (25 mg total) by mouth once daily.    nicotine (NICODERM CQ) 21 mg/24 hr Place 1 patch onto the skin once daily.    EScitalopram oxalate (LEXAPRO) 10 MG tablet Take 1 tablet (10 mg total) by mouth every evening. (Patient not taking: Reported on 8/29/2023)    [DISCONTINUED] furosemide (LASIX) 40 MG tablet Take 1 tablet (40 mg total) by mouth once daily.    [DISCONTINUED] hydrocortisone 2.5 % cream Apply topically 2 (two) times daily.     Family History       Problem Relation (Age of Onset)    Heart disease Father    No Known Problems Mother, Sister, Maternal Grandmother, Maternal Grandfather, Paternal Grandmother, Paternal Grandfather, Brother, Maternal Aunt, Maternal Uncle, Paternal Aunt, Paternal Uncle          Tobacco Use    Smoking status: Former     Current packs/day: 2.00     Average packs/day: 2.0  packs/day for 44.7 years (89.3 ttl pk-yrs)     Types: Cigarettes     Start date: 1979    Smokeless tobacco: Never    Tobacco comments:     Pt is a 2 pk/day cigarette smoker x 45 yrs.He states that he recently cut down to only a few cigarettes per day, but then increased to his usual 2 pk/day again. Pt ready to quit.Ambulatory referral to SmokingCessation clinic following hospital discharge.    Substance and Sexual Activity    Alcohol use: Not Currently    Drug use: Yes     Types: Marijuana    Sexual activity: Not on file     Review of Systems   Constitutional:  Positive for activity change. Negative for chills and fever.   HENT:  Negative for trouble swallowing.    Eyes:  Negative for photophobia and visual disturbance.   Respiratory:  Positive for shortness of breath. Negative for chest tightness and wheezing.    Cardiovascular:  Positive for chest pain. Negative for palpitations and leg swelling.   Gastrointestinal:  Positive for nausea and vomiting. Negative for abdominal pain, constipation and diarrhea.   Genitourinary:  Negative for dysuria, frequency, hematuria and urgency.   Musculoskeletal:  Positive for arthralgias. Negative for back pain and gait problem.   Skin:  Negative for color change and rash.   Neurological:  Negative for dizziness, syncope, weakness, light-headedness, numbness and headaches.   Psychiatric/Behavioral:  Negative for agitation and confusion. The patient is not nervous/anxious.      Objective:     Vital Signs (Most Recent):  Temp: 99.2 °F (37.3 °C) (08/29/23 1101)  Pulse: 72 (08/29/23 1802)  Resp: 14 (08/29/23 1802)  BP: (!) 108/59 (08/29/23 1802)  SpO2: 99 % (08/29/23 1802) Vital Signs (24h Range):  Temp:  [99.2 °F (37.3 °C)] 99.2 °F (37.3 °C)  Pulse:  [66-75] 72  Resp:  [14-18] 14  SpO2:  [95 %-99 %] 99 %  BP: (105-125)/(58-65) 108/59     Weight: 61.7 kg (136 lb)  Body mass index is 17 kg/m².     Physical Exam  Vitals and nursing note reviewed.   Constitutional:       General:  He is not in acute distress.     Appearance: He is well-developed. He is ill-appearing.   HENT:      Head: Normocephalic and atraumatic.      Mouth/Throat:      Mouth: Mucous membranes are moist.   Eyes:      Extraocular Movements: Extraocular movements intact.      Conjunctiva/sclera: Conjunctivae normal.   Cardiovascular:      Rate and Rhythm: Normal rate and regular rhythm.      Heart sounds: Normal heart sounds.   Pulmonary:      Effort: Pulmonary effort is normal. No respiratory distress.      Breath sounds: Rales present. No wheezing.   Abdominal:      General: Bowel sounds are normal. There is no distension.      Palpations: Abdomen is soft.      Tenderness: There is no abdominal tenderness.   Musculoskeletal:         General: No tenderness. Normal range of motion.      Cervical back: Normal range of motion and neck supple.      Right lower leg: No edema.      Left lower leg: No edema.   Skin:     General: Skin is warm and dry.      Capillary Refill: Capillary refill takes less than 2 seconds.      Findings: Bruising present.   Neurological:      General: No focal deficit present.      Mental Status: He is alert and oriented to person, place, and time. Mental status is at baseline.      Cranial Nerves: No cranial nerve deficit.      Sensory: No sensory deficit.      Coordination: Coordination normal.   Psychiatric:         Behavior: Behavior normal.         Thought Content: Thought content normal.         Judgment: Judgment normal.                Significant Labs: All pertinent labs within the past 24 hours have been reviewed.    Significant Imaging: I have reviewed all pertinent imaging results/findings within the past 24 hours.    Assessment/Plan:     * Coronary artery disease involving native coronary artery with unstable angina pectoris  Aortic valve regurgitation  Severe mitral regurgitation  Aortic valve stenosis  Chest pain  NSTEMI  57 y.o. presenting with chest pain consistent with prior episodes of  angina. Pain is not reproducible on exam.  - Relieved with nitroglycerin, continue PRN  -  administered with EMS  - VSSAF   - Tn 0.074 -> 0.094  - BNP 2,619  - EKG showed NSR and LVH  - CXR showed pulmonary edema, pneumonia, or aspiration  - Replace electrolytes, keep Mag > 2 and K >4  - STAT trop and ekg if chest pain occurs  - Continue ASA, statin, plavix, BB  - NPO at midnight as precuation  - CBC, CMP (goal Mag>2, K>4) daily; lipid panel ordered/reviewed   - Monitor telemetry  - Echo pending    - HEART score 6  - If troponin rises, start ACS protocol w/ heparin gtt and consult interventional cardiology    Anemia  - CBC reviewed-   Lab Results   Component Value Date    HGB 10.8 (L) 08/29/2023    HCT 35 (L) 08/29/2023   - Patient's anemia is currently controlled. S/p 0 units of PRBCs.  - Anemia panel pending  - Follow with daily CBC  - Obtain type and screen and transfuse if Hgb <7, Hct <21, or patient is acutely symptomatic    Hypomagnesemia  Patient has Abnormal Magnesium: hypomagnesemia. Will continue to monitor electrolytes closely. Will replace the affected electrolytes and repeat labs to be done after interventions completed. The patient's magnesium results have been reviewed and are listed below.  Recent Labs   Lab 08/29/23  1138   MG 1.2*   - monitor daily mag levels  - replace as needed    Hypokalemia  - Patient has hypokalemia which is currently uncontrolled.   - Last electrolytes reviewed- Recent Labs   Lab 08/29/23  1138   K 2.7*   - Will replace potassium and monitor electrolytes closely.   - Continuous telemetry.    Acute on chronic diastolic heart failure  - CHF Pathway initiated  - BNP 2619 and CXR with pulmonary edema  - IV diuresis with Lasix 40 mg IV BID and monitor response.  Goal diuresis 2-3L/day.    - repeat echo pending  - Strict I&Os with daily weights  - Low Sodium diet with 1.5L fluid restriction  Results for orders placed during the hospital encounter of  08/07/23  Echo  Interpretation Summary    Left Ventricle: regional wall motion abnormalities present. There is low normal systolic function. Ejection fraction by visual approximation is 50%. There is diastolic dysfunction.    Right Ventricle: Normal right ventricular cavity size. Systolic function is normal.    Left Atrium: Left atrium is mildly dilated.    Aortic Valve: There is moderate aortic valve sclerosis. There is severe stenosis. Aortic valve area by VTI is 0.55 cm². Aortic valve peak velocity is 3.8 m/s. Mean gradient is 40 mmHg. The dimensionless index is 0.18. There is moderate aortic regurgitation.    Mitral Valve: Findings are consistent with rheumatic disease. There is moderate bileaflet sclerosis. There is mild stenosis. The mean pressure gradient across the mitral valve is 8 mmHg at a heart rate of 96 bpm. There is moderate regurgitation.    Pulmonic Valve: There is no significant stenosis.    IVC/SVC: Intermediate venous pressure at 8 mmHg.    Cerebrovascular accident (CVA) due to thrombosis of right middle cerebral artery  - hx noted  - continue home plavix and aspirin    Vitamin D deficiency  - continue supplementation     Essential hypertension  Chronic, controlled  - continue home lisinopril and metoprolol  - monitor BP q4    Nicotine dependence in remission  Dangers of cigarette smoking were reviewed with patient in detail for 10 minutes and patient was encouraged to quit. Nicotine replacement options were discussed and ordered.     Renal artery stenosis  - chronic issue  - tight BP control       VTE Risk Mitigation (From admission, onward)         Ordered     IP VTE HIGH RISK PATIENT  Once         08/29/23 1409     Place sequential compression device  Until discontinued         08/29/23 1409                     On 08/29/2023, patient should be placed in hospital observation services under my care in collaboration with Taran Lopez MD.      Lor Villarreal PA-C  Department [facility]  Medicine  [facility] - Emergency Dept

## 2023-08-30 NOTE — NURSING
Home Oxygen Evaluation    Date Performed: 2023    1) Patient's Home O2 Sat on room air, while at rest 97        If O2 sats on room air at rest are 88% or below, patient qualifies. No additional testing needed. Document N/A in steps 2 and 3. If 89% or above, complete steps 2.      2) Patient's O2 Sat on room air while exercisin        If O2 sats on room air while exercising remain 89% or above patient does not qualify, no further testing needed Document N/A in step 3. If O2 sats on room air while exercising are 88% or below, continue to step 3.      3) Patient's O2 Sat while exercising on O2:  at  LPM         (Must show improvement from #2 for patients to qualify)    If O2 sats improve on oxygen, patient qualifies for portable oxygen. If not, the patient does not qualify.

## 2023-08-30 NOTE — SUBJECTIVE & OBJECTIVE
Past Medical History:   Diagnosis Date    Basal cell carcinoma (BCC) of upper lip 6/20/2021    Cerebrovascular accident (CVA) due to thrombosis of right middle cerebral artery 8/9/2023    Coronary artery disease involving native coronary artery without angina pectoris 11/12/2015    Hyperlipidemia 11/12/2015    Hypertension     Syncope and collapse 6/29/2023       Past Surgical History:   Procedure Laterality Date    CORONARY ANGIOGRAPHY  2015    CORONARY ANGIOGRAPHY Right 5/12/2023    Procedure: ANGIOGRAM, CORONARY ARTERY;  Surgeon: Ryan Huertas MD;  Location: [facility] CATH LAB;  Service: Cardiology;  Laterality: Right;    CORONARY ANGIOGRAPHY N/A 8/9/2023    Procedure: ANGIOGRAM, CORONARY ARTERY;  Surgeon: Mao Hernandez III, MD;  Location: [facility] CATH LAB/EP;  Service: Cardiology;  Laterality: N/A;    EXCISION OF LESION OF LIP N/A 07/23/2021    Procedure: EXCISION, LESION, LIP;  Surgeon: Cezar Ellis MD;  Location: [facility] OR [facility] FLR;  Service: ENT;  Laterality: N/A;    FLAP PROCEDURE N/A 08/12/2021    Procedure: CREATION, FREE FLAP;  Surgeon: Cezar Ellis MD;  Location: [facility] OR [facility] FLR;  Service: ENT;  Laterality: N/A;  Radial forearm    FOOT SURGERY      INSTANTANEOUS WAVE-FREE RATIO (IFR) N/A 8/9/2023    Procedure: Instantaneous Wave-Free Ratio (IFR);  Surgeon: Mao Hernandez III, MD;  Location: [facility] CATH LAB/EP;  Service: Cardiology;  Laterality: N/A;    LIP RECONSTRUCTION Bilateral 08/24/2021    Procedure: RECONSTRUCTION, LIP;  Surgeon: Cezar Ellis MD;  Location: [facility] OR [facility]LR;  Service: ENT;  Laterality: Bilateral;    LIP RECONSTRUCTION N/A 10/08/2021    Procedure: RECONSTRUCTION, LIP;  Surgeon: Cezar Ellis MD;  Location: [facility] OR [facility];  Service: ENT;  Laterality: N/A;    NECK EXPLORATION Left 08/12/2021    Procedure: EXPLORATION, NECK;  Surgeon: Cezar Ellis MD;  [facility];  Service: ENT;  Laterality: Left;    RIGHT HEART CATHETERIZATION Right  8/9/2023    Procedure: INSERTION, CATHETER, RIGHT HEART;  Surgeon: Mao Hernandez III, MD;  Location: [facility] CATH LAB/EP;  Service: Cardiology;  Laterality: Right;    ROTATION FLAP SURGERY  07/23/2021    Procedure: CREATION, FLAP, ROTATION;  Surgeon: Cezar Ellis MD;  [facility];  Service: ENT;;    SKIN SPLIT GRAFT Left 08/12/2021    Procedure: APPLICATION, GRAFT, SKIN, SPLIT-THICKNESS;  Surgeon: Cezar Ellis MD;  [facility];  Service: ENT;  Laterality: Left;       Review of patient's allergies indicates:  No Known Allergies    PTA Medications   Medication Sig    aspirin 81 MG Chew Take 1 tablet (81 mg total) by mouth once daily.    atorvastatin (LIPITOR) 80 MG tablet Take 1 tablet (80 mg total) by mouth once daily.    clopidogreL (PLAVIX) 75 mg tablet Take 1 tablet (75 mg total) by mouth once daily.    levETIRAcetam (KEPPRA) 500 MG Tab Take 1 tablet (500 mg total) by mouth 2 (two) times daily.    lisinopriL 10 MG tablet Take 1 tablet (10 mg total) by mouth once daily.    losartan (COZAAR) 25 MG tablet Take 25 mg by mouth once daily.    metoprolol succinate (TOPROL-XL) 25 MG 24 hr tablet Take 1 tablet (25 mg total) by mouth once daily.    nicotine (NICODERM CQ) 21 mg/24 hr Place 1 patch onto the skin once daily.    EScitalopram oxalate (LEXAPRO) 10 MG tablet Take 1 tablet (10 mg total) by mouth every evening. (Patient not taking: Reported on 8/29/2023)     Family History       Problem Relation (Age of Onset)    Heart disease Father    No Known Problems Mother, Sister, Maternal Grandmother, Maternal Grandfather, Paternal Grandmother, Paternal Grandfather, Brother, Maternal Aunt, Maternal Uncle, Paternal Aunt, Paternal Uncle          Tobacco Use    Smoking status: Former     Current packs/day: 2.00     Average packs/day: 2.0 packs/day for 44.7 years (89.3 ttl pk-yrs)     Types: Cigarettes     Start date: 1979    Smokeless tobacco: Never    Tobacco comments:     Pt is a  2 pk/day cigarette smoker x 45 yrs.He states that he recently cut down to only a few cigarettes per day, but then increased to his usual 2 pk/day again. Pt ready to quit.Ambulatory referral to [facility]oking[facility]essation clinic following hospital discharge.    Substance and Sexual Activity    Alcohol use: Not Currently    Drug use: Yes     Types: Marijuana    Sexual activity: Not on file     Review of Systems   Cardiovascular:  Positive for chest pain and dyspnea on exertion.   Respiratory:  Positive for shortness of breath.    All other systems reviewed and are negative.    Objective:     Vital Signs (Most Recent):  Temp: 97.9 °F (36.6 °C) (08/30/23 0759)  Pulse: 69 (08/30/23 0759)  Resp: 18 (08/30/23 0759)  BP: 122/62 (08/30/23 0809)  SpO2: 95 % (08/30/23 0759) Vital Signs (24h Range):  Temp:  [97.9 °F (36.6 °C)-99.2 °F (37.3 °C)] 97.9 °F (36.6 °C)  Pulse:  [66-75] 69  Resp:  [14-18] 18  SpO2:  [95 %-100 %] 95 %  BP: (102-125)/(54-65) 122/62     Weight: 64.9 kg (143 lb)  Body mass index is 17.87 kg/m².    SpO2: 95 %         Intake/Output Summary (Last 24 hours) at 8/30/2023 0925  Last data filed at 8/30/2023 0644  Gross per 24 hour   Intake 250 ml   Output 6200 ml   Net -5950 ml       Lines/Drains/Airways       Peripheral Intravenous Line  Duration                  Peripheral IV - Single Lumen 08/29/23 20 G Left Forearm 1 day         Peripheral IV - Single Lumen 08/29/23 1629 18 G Right Antecubital <1 day                     Physical Exam  Vitals and nursing note reviewed.   Constitutional:       Appearance: Normal appearance.   HENT:      Head: Normocephalic and atraumatic.      Right Ear: External ear normal.      Left Ear: External ear normal.      Nose: Nose normal.      Mouth/Throat:      Mouth: Mucous membranes are moist.   Eyes:      Pupils: Pupils are equal, round, and reactive to light.   Cardiovascular:      Rate and Rhythm: Normal rate and regular rhythm.      Pulses: Normal pulses.      Heart sounds: Murmur  heard.   Pulmonary:      Effort: Pulmonary effort is normal.   Abdominal:      General: Abdomen is flat.   Musculoskeletal:         General: Normal range of motion.      Cervical back: Normal range of motion.   Skin:     General: Skin is warm and dry.      Capillary Refill: Capillary refill takes less than 2 seconds.   Neurological:      General: No focal deficit present.      Mental Status: He is alert and oriented to person, place, and time.            Significant Labs: All pertinent lab results from the last 24 hours have been reviewed.    Significant Imaging:  reviewed

## 2023-08-31 VITALS
HEART RATE: 74 BPM | HEIGHT: 75 IN | RESPIRATION RATE: 17 BRPM | DIASTOLIC BLOOD PRESSURE: 56 MMHG | OXYGEN SATURATION: 93 % | TEMPERATURE: 98 F | WEIGHT: 143 LBS | SYSTOLIC BLOOD PRESSURE: 100 MMHG | BODY MASS INDEX: 17.78 KG/M2

## 2023-08-31 DIAGNOSIS — Z72.0 TOBACCO ABUSE: ICD-10-CM

## 2023-08-31 DIAGNOSIS — I35.0 AORTIC VALVE STENOSIS, ETIOLOGY OF CARDIAC VALVE DISEASE UNSPECIFIED: Primary | ICD-10-CM

## 2023-08-31 DIAGNOSIS — I25.10 CORONARY ARTERY DISEASE INVOLVING NATIVE CORONARY ARTERY OF NATIVE HEART WITHOUT ANGINA PECTORIS: ICD-10-CM

## 2023-08-31 LAB
ANION GAP SERPL CALC-SCNC: 14 MMOL/L (ref 8–16)
BUN SERPL-MCNC: 11 MG/DL (ref 6–20)
CALCIUM SERPL-MCNC: 9.1 MG/DL (ref 8.7–10.5)
CHLORIDE SERPL-SCNC: 97 MMOL/L (ref 95–110)
CO2 SERPL-SCNC: 24 MMOL/L (ref 23–29)
CREAT SERPL-MCNC: 0.8 MG/DL (ref 0.5–1.4)
EST. GFR  (NO RACE VARIABLE): >60 ML/MIN/1.73 M^2
GLUCOSE SERPL-MCNC: 95 MG/DL (ref 70–110)
POTASSIUM SERPL-SCNC: 4.1 MMOL/L (ref 3.5–5.1)
SODIUM SERPL-SCNC: 135 MMOL/L (ref 136–145)

## 2023-08-31 PROCEDURE — 20600001 HC STEP DOWN PRIVATE ROOM

## 2023-08-31 PROCEDURE — S4991 NICOTINE PATCH NONLEGEND: HCPCS

## 2023-08-31 PROCEDURE — 36415 COLL VENOUS BLD VENIPUNCTURE: CPT

## 2023-08-31 PROCEDURE — 80048 BASIC METABOLIC PNL TOTAL CA: CPT

## 2023-08-31 PROCEDURE — 99239 HOSP IP/OBS DSCHRG MGMT >30: CPT | Mod: ,,, | Performed by: HOSPITALIST

## 2023-08-31 PROCEDURE — 99239 PR HOSPITAL DISCHARGE DAY,>30 MIN: ICD-10-PCS | Mod: ,,, | Performed by: HOSPITALIST

## 2023-08-31 PROCEDURE — 99232 PR SUBSEQUENT HOSPITAL CARE,LEVL II: ICD-10-PCS | Mod: ,,, | Performed by: INTERNAL MEDICINE

## 2023-08-31 PROCEDURE — 25000003 PHARM REV CODE 250

## 2023-08-31 PROCEDURE — 99232 SBSQ HOSP IP/OBS MODERATE 35: CPT | Mod: ,,, | Performed by: INTERNAL MEDICINE

## 2023-08-31 RX ORDER — METOPROLOL SUCCINATE 25 MG/1
50 TABLET, EXTENDED RELEASE ORAL DAILY
Qty: 60 TABLET | Refills: 11
Start: 2023-08-31 | End: 2023-09-12 | Stop reason: SDUPTHER

## 2023-08-31 RX ORDER — FUROSEMIDE 40 MG/1
20 TABLET ORAL DAILY
Qty: 30 TABLET | Refills: 1 | Status: SHIPPED | OUTPATIENT
Start: 2023-09-01 | End: 2023-09-12 | Stop reason: SDUPTHER

## 2023-08-31 RX ORDER — FUROSEMIDE 40 MG/1
40 TABLET ORAL DAILY
Qty: 30 TABLET | Refills: 1 | Status: SHIPPED | OUTPATIENT
Start: 2023-09-01 | End: 2023-08-31 | Stop reason: SDUPTHER

## 2023-08-31 RX ADMIN — METOPROLOL SUCCINATE 50 MG: 50 TABLET, EXTENDED RELEASE ORAL at 08:08

## 2023-08-31 RX ADMIN — LISINOPRIL 10 MG: 10 TABLET ORAL at 08:08

## 2023-08-31 RX ADMIN — ASPIRIN 81 MG 81 MG: 81 TABLET ORAL at 08:08

## 2023-08-31 RX ADMIN — NICOTINE 1 PATCH: 14 PATCH, EXTENDED RELEASE TRANSDERMAL at 08:08

## 2023-08-31 RX ADMIN — FUROSEMIDE 40 MG: 40 TABLET ORAL at 08:08

## 2023-08-31 RX ADMIN — CLOPIDOGREL BISULFATE 75 MG: 75 TABLET ORAL at 08:08

## 2023-08-31 RX ADMIN — ATORVASTATIN CALCIUM 80 MG: 40 TABLET, FILM COATED ORAL at 08:08

## 2023-08-31 RX ADMIN — LEVETIRACETAM 500 MG: 500 TABLET, FILM COATED ORAL at 08:08

## 2023-08-31 NOTE — DISCHARGE INSTRUCTIONS
Have your primary care doctor recheck your blood work early next week to ensure your potassium and kidney function is stable.

## 2023-08-31 NOTE — ASSESSMENT & PLAN NOTE
Moderate to severe on outside echo, symptomatic with heart failure, chest pain and syncope  New echo ordered on admission as follows:    Result Date: 8/30/2023    Left Ventricle: The left ventricle is moderately dilated. Normal wall   thickness. There is mild eccentric hypertrophy. Normal wall motion. There   is normal systolic function with a visually estimated ejection fraction of   55 - 60%.    Left Atrium: Left atrium is severely dilated.    Right Ventricle: Normal right ventricular cavity size. There is   hypertrophy. Systolic function is normal.    Aortic Valve: Moderate to severe stenosis. Aortic valve area by VTI is   1.00 cm². Aortic valve peak velocity is 4.07 m/s. Mean gradient is 43   mmHg. The dimensionless index is 0.26. There is moderate aortic   regurgitation. Findings consistent with rheumatic valve disease.    Mitral Valve: Moderately thickened leaflets. Moderately thickened   posterior subvalvular apparatus. There is severe regurgitation. Findings   consistent with rheumatic valve disease.    Pulmonary Artery: The estimated pulmonary artery systolic pressure is   26 mmHg.    IVC/SVC: Normal venous pressure at 3 mmHg.    Pericardium: There is a small effusion adjacent to the right atrium.

## 2023-08-31 NOTE — ASSESSMENT & PLAN NOTE
Multivessel disease on recent cath  Will undergo evaluation w/ CTS to determine if patient is a surgical candidate  Interventional Cardiology also following to see if pt can benefit from PIC if not deemed suitable for surgery

## 2023-08-31 NOTE — NURSING
Patient being discharged home, AVS given with all questions/concerns answered, IV/heart monitor removed, dc meds sent to [facility] and will be delivered bedside, patient waiting for transport, will cont to [facility] for now.      Dc medication delivered to bedside.

## 2023-08-31 NOTE — NURSING
Patient arrived to unit via wheel chair. Patient A/Ox4. Nares patent and respirations even. VSS. Patient denies distress. Will continue to monitor. Safety measures in place. Call light in reach.

## 2023-08-31 NOTE — HPI
57 year old man with mod-sev symptomatic AS, triple vessel CAD, Subclavian steal (Saw  for subclavian stenosis and 50% left CCA - to follow with careful watching), HTN, Smoking (reports to quit now), HLD, moderate MR.      Patient presented to the emergency department with chest pain and dyspnea which woke him up from sleep the prior night.  He said his chest pain was substernal and radiated to right side of his chest and was sharp.  It was not positional and not associated with tenderness.  In the ER he was found to have sinus rhythm on EKG with normal rate, LVH with early repo changes.  Troponins have been negative.  Chest x-ray showed severe bilateral pulmonary edema along with BNP 2600.  Patient was admitted in for further evaluation and management

## 2023-08-31 NOTE — PROGRESS NOTES
[facility] Stepdown  Cardiology  Progress Note    Patient Name: Paresh Vance Jr.  MRN: [id number]  Admission Date: 8/29/2023  Hospital Length of Stay: 0 days  Code Status: Full Code   Attending Physician: Sesar Mcneill MD   Primary Care Physician: Gloria Shahid MD  Expected Discharge Date: 9/1/2023  Principal Problem:Chest pain    Subjective:     Hospital Course:   No notes on file    Interval History:     Cardiology consulted for evaluation and management of congestive heart failure. Pt w/ significant output since initiation of lasix. Will hold to one dose 40mg today. Pt is no apparent distress this morning. No SOB or lower extremity swelling, slight crackles heard on lung auscultation though pt asymptomatic. No JVD appreciated. Will sign off    Review of Systems   Constitutional:  Positive for activity change. Negative for chills and fever.   HENT:  Negative for trouble swallowing.    Eyes:  Negative for photophobia and visual disturbance.   Respiratory:  Negative for chest tightness, shortness of breath and wheezing.    Cardiovascular:  Negative for chest pain, palpitations and leg swelling.   Gastrointestinal:  Negative for abdominal pain, nausea and vomiting.   Genitourinary:  Negative for dysuria and urgency.   Musculoskeletal:  Positive for arthralgias. Negative for back pain.   Skin:  Negative for color change.   Neurological:  Negative for dizziness, weakness, light-headedness, numbness and headaches.   Psychiatric/Behavioral:  Negative for agitation and confusion. The patient is not nervous/anxious.      Objective:     Vital Signs (Most Recent):  Temp: 97.8 °F (36.6 °C) (08/31/23 1216)  Pulse: 69 (08/31/23 1216)  Resp: 18 (08/31/23 1216)  BP: (!) 107/53 (08/31/23 1216)  SpO2: 96 % (08/31/23 1216) Vital Signs (24h Range):  Temp:  [97.8 °F (36.6 °C)-98.4 °F (36.9 °C)] 97.8 °F (36.6 °C)  Pulse:  [64-74] 69  Resp:  [16-18] 18  SpO2:  [94 %-97 %] 96 %  BP: (107-125)/(53-60) 107/53      Weight: 64.9 kg (143 lb)  Body mass index is 17.87 kg/m².    Intake/Output Summary (Last 24 hours) at 8/31/2023 1328  Last data filed at 8/30/2023 1823  Gross per 24 hour   Intake 240 ml   Output 600 ml   Net -360 ml           Physical Exam  Vitals and nursing note reviewed.   Constitutional:       General: He is not in acute distress.     Appearance: He is well-developed. He is cachectic. He is ill-appearing.   HENT:      Head: Normocephalic and atraumatic.      Mouth/Throat:      Mouth: Mucous membranes are moist.      Dentition: Abnormal dentition.   Eyes:      Extraocular Movements: Extraocular movements intact.      Conjunctiva/sclera: Conjunctivae normal.   Neck:      Vascular: No JVD.   Cardiovascular:      Rate and Rhythm: Normal rate and regular rhythm.      Heart sounds: Murmur heard.   Pulmonary:      Effort: Pulmonary effort is normal. No respiratory distress.      Breath sounds: Rales (moderate crackles at BL lower lung bases) present. No wheezing.   Abdominal:      General: Bowel sounds are normal. There is no distension.      Palpations: Abdomen is soft.      Tenderness: There is no abdominal tenderness.   Musculoskeletal:         General: No tenderness. Normal range of motion.      Cervical back: Normal range of motion and neck supple.      Right lower leg: No edema.      Left lower leg: No edema.   Skin:     General: Skin is warm and dry.      Capillary Refill: Capillary refill takes less than 2 seconds.      Findings: Bruising present.   Neurological:      General: No focal deficit present.      Mental Status: He is alert and oriented to person, place, and time. Mental status is at baseline.      Cranial Nerves: No cranial nerve deficit.      Sensory: No sensory deficit.      Coordination: Coordination normal.   Psychiatric:         Behavior: Behavior normal.         Thought Content: Thought content normal.         Judgment: Judgment normal.             Significant Labs: All pertinent labs  within the past 24 hours have been reviewed.    Significant Imaging: I have reviewed all pertinent imaging results/findings within the past 24 hours.        Assessment and Plan:       CAD (coronary artery disease)  Multivessel disease on recent cath  Will undergo evaluation w/ CTS to determine if patient is a surgical candidate  Interventional Cardiology also following to see if pt can benefit from PIC if not deemed suitable for surgery     Acute on chronic diastolic heart failure  Given 160mg of Lasix during early admission, responded w/ 6L output of urine. Held diuretics for rest of day  Started on 40mg this am and still diuresing well, hold additional dosing  Creatinine stable    - Recommend discharging home with PO Lasix 20mg, take additional dose prn for swelling/SOB     Aortic valve stenosis  Moderate to severe on outside echo, symptomatic with heart failure, chest pain and syncope  New echo ordered on admission as follows:    Result Date: 8/30/2023    Left Ventricle: The left ventricle is moderately dilated. Normal wall   thickness. There is mild eccentric hypertrophy. Normal wall motion. There   is normal systolic function with a visually estimated ejection fraction of   55 - 60%.    Left Atrium: Left atrium is severely dilated.    Right Ventricle: Normal right ventricular cavity size. There is   hypertrophy. Systolic function is normal.    Aortic Valve: Moderate to severe stenosis. Aortic valve area by VTI is   1.00 cm². Aortic valve peak velocity is 4.07 m/s. Mean gradient is 43   mmHg. The dimensionless index is 0.26. There is moderate aortic   regurgitation. Findings consistent with rheumatic valve disease.    Mitral Valve: Moderately thickened leaflets. Moderately thickened   posterior subvalvular apparatus. There is severe regurgitation. Findings   consistent with rheumatic valve disease.    Pulmonary Artery: The estimated pulmonary artery systolic pressure is   26 mmHg.    IVC/SVC: Normal  venous pressure at 3 mmHg.    Pericardium: There is a small effusion adjacent to the right atrium.              VTE Risk Mitigation (From admission, onward)         Ordered     IP VTE HIGH RISK PATIENT  Once         08/29/23 0058                Wilbur Qiu MD  Cardiology  [facility] - Cardiology Stepdown

## 2023-08-31 NOTE — SUBJECTIVE & OBJECTIVE
Interval History:     Cardiology consulted for evaluation and management of congestive heart failure. Pt w/ significant output since initiation of lasix. Will hold to one dose 40mg today. Pt is no apparent distress this morning. No SOB or lower extremity swelling, slight crackles heard on lung auscultation though pt asymptomatic. No JVD appreciated. Will sign off    Review of Systems   Constitutional:  Positive for activity change. Negative for chills and fever.   HENT:  Negative for trouble swallowing.    Eyes:  Negative for photophobia and visual disturbance.   Respiratory:  Negative for chest tightness, shortness of breath and wheezing.    Cardiovascular:  Negative for chest pain, palpitations and leg swelling.   Gastrointestinal:  Negative for abdominal pain, nausea and vomiting.   Genitourinary:  Negative for dysuria and urgency.   Musculoskeletal:  Positive for arthralgias. Negative for back pain.   Skin:  Negative for color change.   Neurological:  Negative for dizziness, weakness, light-headedness, numbness and headaches.   Psychiatric/Behavioral:  Negative for agitation and confusion. The patient is not nervous/anxious.      Objective:     Vital Signs (Most Recent):  Temp: 97.8 °F (36.6 °C) (08/31/23 1216)  Pulse: 69 (08/31/23 1216)  Resp: 18 (08/31/23 1216)  BP: (!) 107/53 (08/31/23 1216)  SpO2: 96 % (08/31/23 1216) Vital Signs (24h Range):  Temp:  [97.8 °F (36.6 °C)-98.4 °F (36.9 °C)] 97.8 °F (36.6 °C)  Pulse:  [64-74] 69  Resp:  [16-18] 18  SpO2:  [94 %-97 %] 96 %  BP: (107-125)/(53-60) 107/53     Weight: 64.9 kg (143 lb)  Body mass index is 17.87 kg/m².    Intake/Output Summary (Last 24 hours) at 8/31/2023 1328  Last data filed at 8/30/2023 1823  Gross per 24 hour   Intake 240 ml   Output 600 ml   Net -360 ml           Physical Exam  Vitals and nursing note reviewed.   Constitutional:       General: He is not in acute distress.     Appearance: He is well-developed. He is cachectic. He is ill-appearing.    HENT:      Head: Normocephalic and atraumatic.      Mouth/Throat:      Mouth: Mucous membranes are moist.      Dentition: Abnormal dentition.   Eyes:      Extraocular Movements: Extraocular movements intact.      Conjunctiva/sclera: Conjunctivae normal.   Neck:      Vascular: No JVD.   Cardiovascular:      Rate and Rhythm: Normal rate and regular rhythm.      Heart sounds: Murmur heard.   Pulmonary:      Effort: Pulmonary effort is normal. No respiratory distress.      Breath sounds: Rales (moderate crackles at BL lower lung bases) present. No wheezing.   Abdominal:      General: Bowel sounds are normal. There is no distension.      Palpations: Abdomen is soft.      Tenderness: There is no abdominal tenderness.   Musculoskeletal:         General: No tenderness. Normal range of motion.      Cervical back: Normal range of motion and neck supple.      Right lower leg: No edema.      Left lower leg: No edema.   Skin:     General: Skin is warm and dry.      Capillary Refill: Capillary refill takes less than 2 seconds.      Findings: Bruising present.   Neurological:      General: No focal deficit present.      Mental Status: He is alert and oriented to person, place, and time. Mental status is at baseline.      Cranial Nerves: No cranial nerve deficit.      Sensory: No sensory deficit.      Coordination: Coordination normal.   Psychiatric:         Behavior: Behavior normal.         Thought Content: Thought content normal.         Judgment: Judgment normal.             Significant Labs: All pertinent labs within the past 24 hours have been reviewed.    Significant Imaging: I have reviewed all pertinent imaging results/findings within the past 24 hours.  Review of Systems   Constitutional: Positive for activity change. Negative for chills and fever.   HENT:  Negative for trouble swallowing.    Eyes:  Negative for photophobia and visual disturbance.   Cardiovascular:  Negative for chest pain, leg swelling and  palpitations.   Respiratory:  Negative for chest tightness, shortness of breath and wheezing.    Skin:  Negative for color change.   Musculoskeletal:  Positive for arthralgias. Negative for back pain.   Gastrointestinal:  Negative for abdominal pain, nausea and vomiting.   Genitourinary:  Negative for dysuria and urgency.   Neurological:  Negative for dizziness, headaches, light-headedness, numbness and weakness.   Psychiatric/Behavioral:  Negative for agitation and confusion. The patient is not nervous/anxious.      Physical Exam  Vitals and nursing note reviewed.   Constitutional:       General: He is not in acute distress.     Appearance: He is well-developed. He is cachectic. He is ill-appearing.   HENT:      Head: Normocephalic and atraumatic.      Mouth/Throat:      Mouth: Mucous membranes are moist.      Dentition: Abnormal dentition.   Eyes:      Extraocular Movements: Extraocular movements intact.      Conjunctiva/sclera: Conjunctivae normal.   Neck:      Vascular: No JVD.   Cardiovascular:      Rate and Rhythm: Normal rate and regular rhythm.      Heart sounds: Murmur heard.   Pulmonary:      Effort: Pulmonary effort is normal. No respiratory distress.      Breath sounds: Rales (moderate crackles at BL lower lung bases) present. No wheezing.   Abdominal:      General: Bowel sounds are normal. There is no distension.      Palpations: Abdomen is soft.      Tenderness: There is no abdominal tenderness.   Musculoskeletal:         General: No tenderness. Normal range of motion.      Cervical back: Normal range of motion and neck supple.      Right lower leg: No edema.      Left lower leg: No edema.   Skin:     General: Skin is warm and dry.      Capillary Refill: Capillary refill takes less than 2 seconds.      Findings: Bruising present.   Neurological:      General: No focal deficit present.      Mental Status: He is alert and oriented to person, place, and time. Mental status is at baseline.      Cranial  Nerves: No cranial nerve deficit.      Sensory: No sensory deficit.      Coordination: Coordination normal.   Psychiatric:         Behavior: Behavior normal.         Thought Content: Thought content normal.         Judgment: Judgment normal.

## 2023-08-31 NOTE — ASSESSMENT & PLAN NOTE
Given 160mg of Lasix during early admission, responded w/ 6L output of urine. Held diuretics for rest of day  Started on 40mg this am and still diuresing well, hold additional dosing  Creatinine stable    - Recommend discharging home with PO Lasix 20mg, take additional dose prn for swelling/SOB

## 2023-09-01 LAB — LEVETIRACETAM SERPL-MCNC: 9 UG/ML (ref 3–60)

## 2023-09-01 NOTE — DISCHARGE SUMMARY
"[facility] - Cardiology [facility] Medicine  Discharge Summary      Patient Name: Paresh Vance Jr.  MRN: [id number]  SHEILA: [id number]  Patient Class: IP- Inpatient  Admission Date: 8/29/2023  Hospital Length of Stay: 1 days  Discharge Date and Time: 8/31/2023  7:08 PM  Attending Physician: Michaela att. providers found   Discharging Provider: Sesar Mcneill MD  Primary Care Provider: Gloria Shahid MD  Hospital Medicine Team: [facility] HOSP MED  Sesar Mcneill MD  Primary Care Team: [facility]    HPI:   Paresh Vance Jr. is a 57 y.o. M with a PMHx of severe AS, triple vessel CAD (via cath in the last year), CHF, CVA, HLD, HTN, tobacco abuse who presents to [facility] for evaluation of chest pain. Patient reports substernal chest pain woke him up from sleep around 12AM last night. He describes pain as "sharp and intense" and says it felt like he was unable to take a deep breath. Endorses associated shortness of breath, diaphoresis, right shoulder pain, and nausea with 4 episodes of vomiting. Pain improved from 10/10 to 3/10 after he was given SL nitroglycerin x3 and ASA 325mg by EMS. Currently chest pain free. Denies fever, chills, LE swelling, orthopnea, HA, vision changes, numbness/tingling or syncope.    In the ED, AFVSS. No leukocytosis. K 2.7, Mag 1.2, t.bili 1.4. Trop 0.080>>0.074. BNP 2619. EKG without acute ST/T wave changes. CXR read as "pulmonary edema, pneumonia, aspiration, or sepsis". Given lasix 80mg IVP x1. Patient admitted to hospital medicine for further evaluation and management.       * No surgery found *      Hospital Course:   Mr. Vance was placed in observation for acute CHF exacerbation, chest pain, and severe aortic stenosis. Pt was started on IV lasix with significant urine output (net -6L) and is now euvolemic. Cardiology consulted given complex cardiac history and chest pain and recommend continuing home DAPT. CTS surgery was consulted and recommended outpt follow with nutrition " optimization; not a surgical candidate in the moment. Interventional cardiology rec'd f/u with CTS surgery, deferred PCI and agreed w/ outpt CTS f/u. Medical cardiology deemed the patient to be euvolemic.  Patient himself had improvement in shortness of breath, no recurrence of chest pain.  Patient has met maximum benefit from hospitalization and is clinically stable for discharge.    Patient was advised to follow up with Cardiothoracic surgery.      Clear lungs bilaterally, unlabored breathing, on room air, no cyanosis  Heart sounds indicate a regular rate and rhythm   No obvious lower extremity edema  Awake alert, no acute distress         Goals of Care Treatment Preferences:  Code Status: Full Code      Consults:   Consults (From admission, onward)        Status Ordering Provider     Inpatient consult to Cardiothoracic Surgery  Once        Provider:  (Not yet assigned)    Completed LULU MACHADO     Inpatient consult to Interventional Cardiology  Once        Provider:  (Not yet assigned)    Completed JESENIA HERNANDEZ     Inpatient consult to Cardiology  Once        Provider:  (Not yet assigned)    Completed SARINA GREEN     Inpatient consult to Registered Dietitian/Nutritionist  Once        Provider:  (Not yet assigned)    Completed SARINA GREEN          No new Assessment & Plan notes have been filed under this hospital service since the last note was generated.  Service: Hospital Medicine    Final Active Diagnoses:    Diagnosis Date Noted POA    PRINCIPAL PROBLEM:  Chest pain [R07.9] 11/12/2015 Yes    CAD (coronary artery disease) [I25.10] 08/31/2023 Yes    Coronary artery disease involving native coronary artery of native heart with unstable angina pectoris [I25.110] 08/30/2023 Yes    Acute on chronic diastolic heart failure [I50.33] 08/29/2023 Yes    Hypokalemia [E87.6] 08/29/2023 Yes    Hypomagnesemia [E83.42] 08/29/2023 Yes    Anemia [D64.9] 08/29/2023 Yes    Cerebrovascular accident  (CVA) due to thrombosis of right middle cerebral artery [I63.311] 08/09/2023 Yes    Aortic valve stenosis [I35.0] 06/05/2023 Yes    Aortic valve regurgitation [I35.1] 06/05/2023 Yes    NSTEMI (non-ST elevated myocardial infarction) [I21.4] 05/12/2023 Yes    Severe mitral regurgitation [I34.0] 05/12/2023 Yes    Vitamin D deficiency [E55.9] 02/27/2020 Yes     Chronic    Essential hypertension [I10] 11/05/2019 Yes     Chronic    Nicotine dependence in remission [F17.201] 09/17/2015 Not Applicable    Renal artery stenosis [I70.1] 09/17/2015 Yes      Problems Resolved During this Admission:       Discharged Condition: good    Disposition: Home or Self Care    Follow Up:   Follow-up Information     Petr Loyola MD. Schedule an appointment as soon as possible for a visit.    Specialties: Cardiothoracic Surgery, Transplant  Contact information:  [address]  [place] [address]  [phone]             Gloria Shahid MD. Schedule an appointment as soon as possible for a visit in 1 week(s).    Specialties: Internal Medicine, Physical Medicine and Rehabilitation  Contact information:  [address]  [facility] [address]  [phone]                       Patient Instructions:      Ambulatory referral/consult to Cardiothoracic Surgery   Standing Status: Future   Referral Priority: Routine Referral Type: Consultation   Referral Reason: Specialty Services Required   Requested Specialty: Cardiothoracic Surgery   Number of Visits Requested: 1     Ambulatory referral/consult to Cardiology   Standing Status: Future   Referral Priority: Routine Referral Type: Consultation   Referral Reason: Specialty Services Required   Requested Specialty: Cardiology   Number of Visits Requested: 1           Pending Diagnostic Studies:     None         Medications:  Reconciled Home Medications:      Medication List      START taking these medications    furosemide 40 MG tablet  Commonly known as: LASIX  Take 0.5  tablets (20 mg total) by mouth once daily. Can take additional 1 tab daily for leg swelling.        CHANGE how you take these medications    metoprolol succinate 25 MG 24 hr tablet  Commonly known as: TOPROL-XL  Take 2 tablets (50 mg total) by mouth once daily.  What changed: how much to take        CONTINUE taking these medications    aspirin 81 MG Chew  Take 1 tablet (81 mg total) by mouth once daily.     atorvastatin 80 MG tablet  Commonly known as: LIPITOR  Take 1 tablet (80 mg total) by mouth once daily.     clopidogreL 75 mg tablet  Commonly known as: PLAVIX  Take 1 tablet (75 mg total) by mouth once daily.     levETIRAcetam 500 MG Tab  Commonly known as: KEPPRA  Take 1 tablet (500 mg total) by mouth 2 (two) times daily.     lisinopriL 10 MG tablet  Take 1 tablet (10 mg total) by mouth once daily.     nicotine 21 mg/24 hr  Commonly known as: NICODERM CQ  Place 1 patch onto the skin once daily.        STOP taking these medications    losartan 25 MG tablet  Commonly known as: COZAAR        ASK your doctor about these medications    EScitalopram oxalate 10 MG tablet  Commonly known as: LEXAPRO  Take 1 tablet (10 mg total) by mouth every evening.            Indwelling Lines/Drains at time of discharge:   Lines/Drains/Airways     None                 Time spent on the discharge of patient: 35 minutes         Sesar Mcneill MD  Department of Hospital Medicine  [facility] - Cardiology Stepdown

## 2023-09-01 NOTE — PLAN OF CARE
Pt discharged home with no post-acute needs. F/U appts scheduled by [facility].    Gordon Hernandez [facility]  Case Management Department  [email or web address] - Cardiology Stepdown  Discharge Final Note    Primary Care Provider: Gloria Shahid MD    Expected Discharge Date: 8/31/2023    Final Discharge Note (most recent)       Final Note - 09/01/23 1151          Final Note    Assessment Type Final Discharge Note     Anticipated Discharge Disposition Home or Self Care     What phone number can be called within the next 1-3 days to see how you are doing after discharge? [phone]     Hospital Resources/Appts/Education Provided Provided patient/caregiver with written discharge plan information;Appointments scheduled and added to AVS;Post-Acute resouces added to AVS        Post-Acute Status    Post-Acute Authorization Other     Other Status No Post-Acute Service Needs     Discharge Delays None known at this time                     Important Message from Medicare             Contact Info       Petr Loyola MD   Specialty: Cardiothoracic Surgery, Transplant    [address] [facility] [address]   Phone: [phone]       Next Steps: Schedule an appointment as soon as possible for a visit    Gloria Shahid MD   Specialty: Internal Medicine, Physical Medicine and Rehabilitation   Relationship: PCP - General    [facility] [address]   Phone: [phone]       Next Steps: Schedule an appointment as soon as possible for a visit in 1 week(s)          Future Appointments   Date Time Provider Department Center   9/7/2023 11:00 AM LAB, APPOINTMENT [facility] LAB VNP [facility]   9/7/2023 11:30 AM Malika Huerta PA-C Counts include 234 beds at the [facility]   9/7/2023 11:30 AM [facility] HEART FAILURE NURSE [facility]   9/8/2023 11:20 AM Ben Davila MD [facility] STROKE8 [facility]   9/25/2023  1:00 PM Gloria Shahid MD [facility] Total Access   9/28/2023  2:45 PM PULMONARY FUNCTION [facility] PULMLAB  [facility]   9/28/2023  3:00 PM PULMONARY FUNCTION [facility] PULMLAB [facility]   9/28/2023  3:15 PM PULMONARY FUNCTION [facility] PULAB [facility]   9/28/2023  4:30 PM Petr Loyola MD [facility]        There are no Wet Read(s) to document.

## 2023-09-05 ENCOUNTER — TELEPHONE (OUTPATIENT)
Dept: CARDIOLOGY | Facility: CLINIC | Age: 58
End: 2023-09-05
Payer: MEDICAID

## 2023-09-05 NOTE — TELEPHONE ENCOUNTER
"[facility]) weekly phone follow up / triage call completed.     [facility] RN Navigator spoke with PT    Current Patient reported weight: No Scale   Patient Goal Weight: (Has Not been to [facility])  Recent Patient reported blood pressure and heart rate: No BP Cuff    Pt reports the following:  []  Shortness of Breath with Activity  []  Shortness of Breath at rest   []  Fatigue  []  Edema   [] Chest pain or tightness  [] Weight Increase since discharge  [x] None of the above    Pt reports using "Daily weight and symptom tracker".    Pt reports being in the GREEN(color) Zone. If in yellow/red, reminded that they should be calling [facility] today or now.     Medications:   Medication compliance reviewed with pt.  Pt reports having medication list available and has all medications at home for use per list.     Education:   Confirmed pt still has "[facility] Home Care Guide"  .     Reminded of key points as listed below.     Recommend 2 -3 gram sodium restriction and 1500 cc-2000 cc fluid restriction.  Encourage physical activity with graded exercise program.  Requested patient to weigh themselves daily, and to notify us if their weight increases by more than 3 lbs in 1 day or 5 lbs in 3 days.   Reminded to use "Daily weight and symptom tracker".  Even if pt does not have a scale, to use symptom tracker.       Watch for these Signs and Symptoms: If any of these occur, contact [facility] immediately:   Increase in shortness of breath with movement   Increase in swelling in your legs and ankles   Weight gain of more than 3 pounds in a day or 5 pounds in 3 days.   Difficulty breathing when you are lying down   Worsening fatigue or tiredness   Stomach bloating, a full feeling or a loss of appetite   Increased coughing--especially when you are lying down      Pt was able to verbalize back to RN in their own words correct diet/fluid restrictions, necessity for exercise, warning signs " and symptoms, when and how to contact their [facility] team.      Pt reminded of upcoming appointment.  PT reports they will attend. PT calls to reschedule as he has no ride today      Pt reminded of how and when to contact [facility]:  [phone] (Mon-Fri, 8a-5p) & for urgent issues on the weekend to page the Heart Transplant MD on call.  Pt also encouraged utilize [facility] messaging as well.      Pt  verbalized understanding and in agreement of plan.       Will follow up with pt at next clinic visit and RN navigator available for pt questions, issues or concerns.      PT trying to get a cuff and scale, reschedules appt with Tasha. Stressed he is to call the [facility] if he has any S/S of Fluid overload as listed in his [facility] HCG

## 2023-09-15 ENCOUNTER — PATIENT MESSAGE (OUTPATIENT)
Dept: INTENSIVE CARE | Facility: HOSPITAL | Age: 58
End: 2023-09-15
Payer: MEDICAID

## 2023-09-15 ENCOUNTER — DOCUMENTATION ONLY (OUTPATIENT)
Dept: CARDIOLOGY | Facility: CLINIC | Age: 58
End: 2023-09-15
Payer: MEDICAID

## 2023-09-15 ENCOUNTER — TELEPHONE (OUTPATIENT)
Dept: TRANSPLANT | Facility: CLINIC | Age: 58
End: 2023-09-15
Payer: MEDICAID

## 2023-09-15 NOTE — PROGRESS NOTES
" [facility]) weekly phone follow up / triage call completed.     This note is Out Of Sequence  On 9-12-23    [facility] RN Navigator spoke with  after he rescheduled his appointment that he No Showed    Current Patient reported weight: No Scale   Patient Goal Weight: (Unsure)  Recent Patient reported blood pressure and heart rate: No Cuff    Pt reports the following:  []  Shortness of Breath with Activity  []  Shortness of Breath at rest   []  Fatigue  []  Edema   [] Chest pain or tightness  [] Weight Increase since discharge  [x] None of the above    Pt reports using "Daily weight and symptom tracker".    Pt reports being in the GREEN(color) Zone. If in yellow/red, reminded that they should be calling [facility] today or now.     Medications:   Medication compliance reviewed with pt.  Pt reports having medication list available and has all medications at home for use per list.     Education:   Confirmed pt still has "Heart Failure [facility] Home Care Guide"  .     Reminded of key points as listed below.     Recommend 2 -3 gram sodium restriction and 1500 cc-2000 cc fluid restriction.  Encourage physical activity with graded exercise program.  Requested patient to weigh themselves daily, and to notify us if their weight increases by more than 3 lbs in 1 day or 5 lbs in 3 days.   Reminded to use "Daily weight and symptom tracker".  Even if pt does not have a scale, to use symptom tracker.       Watch for these Signs and Symptoms: If any of these occur, contact [facility] immediately:   Increase in shortness of breath with movement   Increase in swelling in your legs and ankles   Weight gain of more than 3 pounds in a day or 5 pounds in 3 days.   Difficulty breathing when you are lying down   Worsening fatigue or tiredness   Stomach bloating, a full feeling or a loss of appetite   Increased coughing--especially when you are lying down      Pt was able to verbalize back to RN in their " own words correct diet/fluid restrictions, necessity for exercise, warning signs and symptoms, when and how to contact their [facility] team.      Pt reminded of upcoming appointment.  PT reports they will attend. PT unable to schedule 2/2 has no transportation      Pt reminded of how and when to contact [facility]:  [phone] (Mon-Fri, 8a-5p) & for urgent issues on the weekend to page the Heart Transplant MD on call.  Pt also encouraged utilize [facility] messaging as well.      Pt  states it is difficult because he has no transportation and No scale or BP cuff and no way to get any.    Will follow up with pt at next clinic visit and RN navigator available for pt questions, issues or concerns.

## 2023-09-15 NOTE — TELEPHONE ENCOUNTER
----- Message from Jaenth Willson RN sent at 9/15/2023 11:30 AM CDT -----  Regarding: Weekly call PT has No Showed twice, no Trans/Scale/Cuff

## 2023-09-15 NOTE — TELEPHONE ENCOUNTER
Contacted to reschedule missed appointment.  Confirmed 9/28/2023 at 1330 to see REMEDIOS Zuniga with [facility]. Appt letter mailed per request.

## 2023-09-17 ENCOUNTER — EXTERNAL HOME HEALTH (OUTPATIENT)
Dept: HOME HEALTH SERVICES | Facility: HOSPITAL | Age: 58
End: 2023-09-17
Payer: MEDICAID

## 2023-09-19 ENCOUNTER — TELEPHONE (OUTPATIENT)
Dept: CARDIOLOGY | Facility: CLINIC | Age: 58
End: 2023-09-19
Payer: MEDICAID

## 2023-09-19 NOTE — TELEPHONE ENCOUNTER
"[facility]    Attempted to call pt to complete 1 week "check in" call. Unable to reach pt at listed phone numbers.  Was able to leave message on voicemail encouraging pt to return call with [facility] phone number..     Will continue to try to reach patient.      Request a return call  "

## 2023-09-22 ENCOUNTER — TELEPHONE (OUTPATIENT)
Dept: CARDIOLOGY | Facility: CLINIC | Age: 58
End: 2023-09-22
Payer: MEDICAID

## 2023-09-22 DIAGNOSIS — I10 PRIMARY HYPERTENSION: ICD-10-CM

## 2023-09-22 DIAGNOSIS — I25.10 CORONARY ARTERY DISEASE INVOLVING NATIVE CORONARY ARTERY OF NATIVE HEART WITHOUT ANGINA PECTORIS: ICD-10-CM

## 2023-09-22 RX ORDER — FUROSEMIDE 40 MG/1
20 TABLET ORAL DAILY
Qty: 45 TABLET | Refills: 3 | Status: SHIPPED | OUTPATIENT
Start: 2023-09-22 | End: 2024-09-21

## 2023-09-22 RX ORDER — CLOPIDOGREL BISULFATE 75 MG/1
75 TABLET ORAL DAILY
Qty: 90 TABLET | Refills: 0 | Status: SHIPPED | OUTPATIENT
Start: 2023-09-22 | End: 2024-09-21

## 2023-09-22 RX ORDER — METOPROLOL SUCCINATE 25 MG/1
25 TABLET, EXTENDED RELEASE ORAL DAILY
Qty: 90 TABLET | Refills: 3 | Status: SHIPPED | OUTPATIENT
Start: 2023-09-22 | End: 2024-09-21

## 2023-09-22 NOTE — TELEPHONE ENCOUNTER
PT calls to request refills of Keppra, Plavix, Metoprolol and Lasix to be sent to his pharmacy in [facility].Pt advised that we can't refill the Keppra because that is a seizure medicine and is out of Ms. Bradley's scope. Recommended that PT call Dr. Garcia to renew his Keppra b/c I tried to send a secure chat but this MD is not online. PT agrees to call. He also tells me in answer to Ms. Raman query yes he take his Lipitor daily and has 3 refills on that.    Pt states that he is breathing well, has no swelling and has not had any Chest Pain. He still does not have a BP Cuff/Scale. He is doing his best to watch the salt/fluid in his diet. PT has F/U visits on 9-28 all day for Pulmonary and once again his first F2F visit with [facility] LAWRENCE Huerta. Pt states he has a ride with his son to these visits.

## 2024-05-19 NOTE — HOSPITAL COURSE
8/8/2023 per HPI    Patient Education     Middle Ear Infection (Adult)   You have an infection of the middle ear, the space behind the eardrum. This is also called acute otitis media (AOM). Sometimes it's caused by the common cold. This is because congestion can block the internal passage (eustachian tube) that drains fluid from the middle ear. When the middle ear fills with fluid, bacteria can grow there and cause an infection. Oral antibiotics are used to treat this illness, not ear drops. Symptoms usually start to improve within 1 to 2 days of treatment.    Home care  The following are general care guidelines:  Finish all of the antibiotic medicine given, even though you may feel better after the first few days.  You may use over-the-counter medicine, such as acetaminophen or ibuprofen, to control pain and fever, unless something else was prescribed. Talk with your healthcare provider before using these medicines if you have chronic liver or kidney disease. Also talk with your provider if you have had a stomach ulcer or digestive bleeding. Don't give aspirin to anyone under 18 years of age who has a fever. It may cause severe illness or death.  Follow-up care  Follow up with your healthcare provider in 2 weeks, or as advised, if all symptoms have not gotten better, or if hearing doesn't go back to normal within 1 month.  When to seek medical advice  Call your healthcare provider right away if any of these occur:  Ear pain gets worse or does not improve after 3 days of treatment  Unusual drowsiness or confusion  Neck pain, stiff neck, or headache  Fluid or blood draining from the ear canal  Fever of 100.4°F (38°C) or as advised   Seizure  [employer] last reviewed this educational content on 9/1/2019  © 4100-9730 [employer]. All rights reserved. This information is not intended as a substitute for professional medical care. Always follow your healthcare professional's instructions.

## (undated) DEVICE — ELECTRODE REM PLYHSV RETURN 9

## (undated) DEVICE — LINER GLOVE POWDERFREE SZ 7.5

## (undated) DEVICE — CLAMP SINGLE MICRO.

## (undated) DEVICE — SHEET EENT SPLIT

## (undated) DEVICE — CLIP DOUBLE MICRO.

## (undated) DEVICE — SET DECANTER MEDICHOICE

## (undated) DEVICE — DRESSING TRANS 4X4 TEGADERM

## (undated) DEVICE — DRAPE OPMI STERILE

## (undated) DEVICE — SUT 3-0 12-18IN SILK

## (undated) DEVICE — KIT SAHARA DRAPE DRAW/LIFT

## (undated) DEVICE — CONTAINER SPECIMEN STRL 4OZ

## (undated) DEVICE — GUIDE MICRO-GRID SIL GRN

## (undated) DEVICE — SUT VICRYL PLUS 3-0 SH 18IN

## (undated) DEVICE — STAPLER SKIN ROTATING HEAD

## (undated) DEVICE — SEE L#95700

## (undated) DEVICE — CUP MEDICINE STERILE 2OZ

## (undated) DEVICE — COVER LIGHT HANDLE 80/CA

## (undated) DEVICE — DRESSING AQUACEL SACRAL 9 X 9

## (undated) DEVICE — SKINMARKER & RULER REGULAR X-F

## (undated) DEVICE — HEMOSTAT VASC BAND REG 24CM

## (undated) DEVICE — TRAY MINOR GEN SURG

## (undated) DEVICE — SPONGE LAP 18X18 PREWASHED

## (undated) DEVICE — GLOVE SURGICAL LATEX SZ 7

## (undated) DEVICE — GOWN SURGICAL X-LARGE

## (undated) DEVICE — SUCTION SURGICAL FRAZR

## (undated) DEVICE — KIT URINARY CATH URINE METER

## (undated) DEVICE — GAUZE SPONGE PEANUT STRL

## (undated) DEVICE — SET MICRO PUNCT 4FR/MPIS-401

## (undated) DEVICE — SEE MEDLINE ITEM 146345

## (undated) DEVICE — CORD BIPOLAR 12 FOOT

## (undated) DEVICE — PAD K-THERMIA 24IN X 60IN

## (undated) DEVICE — SUT LIGACLIP SMALL XTRA

## (undated) DEVICE — CATH IV INTROCAN 20G X 1.1

## (undated) DEVICE — SOL NS 1000CC

## (undated) DEVICE — SUT ETHILON 3-0 PS2 18 BLK

## (undated) DEVICE — SEE MEDLINE ITEM 157128

## (undated) DEVICE — SPONGE WEC CEL SPEARS

## (undated) DEVICE — SUT VICRYL PLUS 2-0 CT1 18

## (undated) DEVICE — MICRO CLIP

## (undated) DEVICE — SUT 4-0 VICRYL / SH

## (undated) DEVICE — ELECTRODE BLADE INSULATED 1 IN

## (undated) DEVICE — SEE MEDLINE ITEM 152622

## (undated) DEVICE — NDL HYPO REG 25G X 1 1/2

## (undated) DEVICE — SUT 2-0 12-18IN SILK

## (undated) DEVICE — MINERAL OIL 10ML MURI LUBE

## (undated) DEVICE — SOL IRR NACL .9% 3000ML

## (undated) DEVICE — SEE MEDLINE ITEM 157150

## (undated) DEVICE — PENCIL ROCKER SWITCH 10FT CORD

## (undated) DEVICE — SYR 3CC LUER LOC

## (undated) DEVICE — BOOT AIR FLUID HEEL ADLT STD

## (undated) DEVICE — SUT 9/0 5IN ETHILON BLK MON

## (undated) DEVICE — CATH SWAN DBL LMN 5FR 110CM

## (undated) DEVICE — CONTRAST VISIPAQUE 150ML

## (undated) DEVICE — DRAPE STERI INSTRUMENT 1018

## (undated) DEVICE — CATH IV INTROCAN 22G X 1

## (undated) DEVICE — TRAY FOLEY 16FR INFECTION CONT

## (undated) DEVICE — CATH OPTITORQUE TIGER 5F 100CM

## (undated) DEVICE — SEE MEDLINE ITEM 146373

## (undated) DEVICE — VALVE CONTROL COPILOT

## (undated) DEVICE — CLIP MED TICALL

## (undated) DEVICE — KIT MEROCEL INSTRUMENT WIPE

## (undated) DEVICE — GUIDEWIRE OMNI J TIP 185CM

## (undated) DEVICE — ADHESIVE DERMABOND ADVANCED

## (undated) DEVICE — SUT PROLENE 5-0 PS-2 18

## (undated) DEVICE — PACK UNIVERSAL SPLIT II

## (undated) DEVICE — BLADE SURG #15 CARBON STEEL

## (undated) DEVICE — SEE MEDLINE ITEM 152512

## (undated) DEVICE — TRAY ENT 4/CS

## (undated) DEVICE — SUT SILK 2-0 PS 18IN BLACK

## (undated) DEVICE — LUBRICANT SURGILUBE 2 OZ

## (undated) DEVICE — SUT MCRYL PLUS 4-0 PS2 27IN

## (undated) DEVICE — DRAPE ANGIO BRACH 38X44IN

## (undated) DEVICE — KIT GLIDESHEATH SLEND 6FR 10CM

## (undated) DEVICE — SEE MEDLINE ITEM 154981

## (undated) DEVICE — SUT 2/0 30IN SILK BLK BRAI

## (undated) DEVICE — STAPLER SKIN PROXIMATE WIDE

## (undated) DEVICE — CATH IMPULSE 5FR PIGTAIL 125CM

## (undated) DEVICE — INSTRUMENT SURG SUCT FRZR W/C

## (undated) DEVICE — COVER PROBE US 5.5X58L NON LTX

## (undated) DEVICE — GUIDE LAUNCHER 6FR JL 4.0

## (undated) DEVICE — SEALER LIGASURE CRV 18.8CM

## (undated) DEVICE — SUT VICRYL 3-0 27 SH

## (undated) DEVICE — SUT 2-0 SILK 30IN BLK BRAID

## (undated) DEVICE — SEE MEDLINE ITEM 157194

## (undated) DEVICE — HOOK LONE STAR BLUNT 12MM

## (undated) DEVICE — DEFIBRILLATOR STAT PADZ II

## (undated) DEVICE — GUIDWIRE HI-TORQUE .018X300CM

## (undated) DEVICE — SUT COATED VICRYL 4/0 27IN

## (undated) DEVICE — Device